# Patient Record
Sex: FEMALE | Race: WHITE
[De-identification: names, ages, dates, MRNs, and addresses within clinical notes are randomized per-mention and may not be internally consistent; named-entity substitution may affect disease eponyms.]

---

## 2017-05-30 ENCOUNTER — HOSPITAL ENCOUNTER (INPATIENT)
Dept: HOSPITAL 17 - PHED | Age: 82
LOS: 3 days | Discharge: HOME | DRG: 378 | End: 2017-06-02
Attending: INTERNAL MEDICINE | Admitting: INTERNAL MEDICINE
Payer: MEDICARE

## 2017-05-30 VITALS
OXYGEN SATURATION: 98 % | TEMPERATURE: 99.1 F | RESPIRATION RATE: 12 BRPM | HEART RATE: 94 BPM | SYSTOLIC BLOOD PRESSURE: 144 MMHG | DIASTOLIC BLOOD PRESSURE: 75 MMHG

## 2017-05-30 VITALS
HEART RATE: 100 BPM | OXYGEN SATURATION: 96 % | RESPIRATION RATE: 26 BRPM | DIASTOLIC BLOOD PRESSURE: 70 MMHG | SYSTOLIC BLOOD PRESSURE: 140 MMHG

## 2017-05-30 VITALS
DIASTOLIC BLOOD PRESSURE: 87 MMHG | HEART RATE: 94 BPM | SYSTOLIC BLOOD PRESSURE: 132 MMHG | RESPIRATION RATE: 26 BRPM | OXYGEN SATURATION: 100 %

## 2017-05-30 VITALS
DIASTOLIC BLOOD PRESSURE: 76 MMHG | RESPIRATION RATE: 39 BRPM | SYSTOLIC BLOOD PRESSURE: 115 MMHG | OXYGEN SATURATION: 100 % | TEMPERATURE: 98.7 F | HEART RATE: 92 BPM

## 2017-05-30 VITALS
OXYGEN SATURATION: 99 % | HEART RATE: 94 BPM | TEMPERATURE: 98 F | SYSTOLIC BLOOD PRESSURE: 83 MMHG | DIASTOLIC BLOOD PRESSURE: 44 MMHG | RESPIRATION RATE: 20 BRPM

## 2017-05-30 VITALS
RESPIRATION RATE: 23 BRPM | TEMPERATURE: 98.7 F | OXYGEN SATURATION: 99 % | HEART RATE: 91 BPM | SYSTOLIC BLOOD PRESSURE: 116 MMHG | DIASTOLIC BLOOD PRESSURE: 53 MMHG

## 2017-05-30 VITALS
HEART RATE: 93 BPM | RESPIRATION RATE: 16 BRPM | OXYGEN SATURATION: 98 % | DIASTOLIC BLOOD PRESSURE: 53 MMHG | SYSTOLIC BLOOD PRESSURE: 94 MMHG

## 2017-05-30 VITALS
SYSTOLIC BLOOD PRESSURE: 109 MMHG | RESPIRATION RATE: 22 BRPM | OXYGEN SATURATION: 100 % | HEART RATE: 92 BPM | DIASTOLIC BLOOD PRESSURE: 56 MMHG

## 2017-05-30 VITALS — DIASTOLIC BLOOD PRESSURE: 61 MMHG | SYSTOLIC BLOOD PRESSURE: 119 MMHG

## 2017-05-30 VITALS
OXYGEN SATURATION: 98 % | SYSTOLIC BLOOD PRESSURE: 114 MMHG | DIASTOLIC BLOOD PRESSURE: 56 MMHG | HEART RATE: 96 BPM | RESPIRATION RATE: 16 BRPM

## 2017-05-30 VITALS
DIASTOLIC BLOOD PRESSURE: 88 MMHG | OXYGEN SATURATION: 100 % | RESPIRATION RATE: 22 BRPM | SYSTOLIC BLOOD PRESSURE: 145 MMHG | HEART RATE: 95 BPM

## 2017-05-30 VITALS — HEIGHT: 65 IN | WEIGHT: 159.39 LBS | BODY MASS INDEX: 26.56 KG/M2

## 2017-05-30 VITALS
DIASTOLIC BLOOD PRESSURE: 59 MMHG | RESPIRATION RATE: 16 BRPM | SYSTOLIC BLOOD PRESSURE: 102 MMHG | TEMPERATURE: 97.7 F | OXYGEN SATURATION: 98 % | HEART RATE: 95 BPM

## 2017-05-30 VITALS
TEMPERATURE: 98 F | SYSTOLIC BLOOD PRESSURE: 141 MMHG | DIASTOLIC BLOOD PRESSURE: 73 MMHG | HEART RATE: 96 BPM | RESPIRATION RATE: 22 BRPM | OXYGEN SATURATION: 100 %

## 2017-05-30 VITALS
SYSTOLIC BLOOD PRESSURE: 96 MMHG | DIASTOLIC BLOOD PRESSURE: 53 MMHG | HEART RATE: 89 BPM | RESPIRATION RATE: 16 BRPM | OXYGEN SATURATION: 100 %

## 2017-05-30 VITALS
RESPIRATION RATE: 16 BRPM | SYSTOLIC BLOOD PRESSURE: 109 MMHG | OXYGEN SATURATION: 98 % | DIASTOLIC BLOOD PRESSURE: 56 MMHG | HEART RATE: 91 BPM

## 2017-05-30 VITALS
DIASTOLIC BLOOD PRESSURE: 85 MMHG | HEART RATE: 96 BPM | OXYGEN SATURATION: 99 % | SYSTOLIC BLOOD PRESSURE: 140 MMHG | RESPIRATION RATE: 27 BRPM

## 2017-05-30 VITALS — HEART RATE: 96 BPM

## 2017-05-30 VITALS
SYSTOLIC BLOOD PRESSURE: 125 MMHG | HEART RATE: 90 BPM | DIASTOLIC BLOOD PRESSURE: 80 MMHG | OXYGEN SATURATION: 100 % | RESPIRATION RATE: 26 BRPM

## 2017-05-30 VITALS — OXYGEN SATURATION: 97 %

## 2017-05-30 VITALS
SYSTOLIC BLOOD PRESSURE: 147 MMHG | HEART RATE: 102 BPM | DIASTOLIC BLOOD PRESSURE: 75 MMHG | OXYGEN SATURATION: 98 % | RESPIRATION RATE: 36 BRPM

## 2017-05-30 VITALS
OXYGEN SATURATION: 95 % | HEART RATE: 96 BPM | SYSTOLIC BLOOD PRESSURE: 140 MMHG | RESPIRATION RATE: 27 BRPM | DIASTOLIC BLOOD PRESSURE: 78 MMHG

## 2017-05-30 VITALS — OXYGEN SATURATION: 100 %

## 2017-05-30 DIAGNOSIS — Z96.653: ICD-10-CM

## 2017-05-30 DIAGNOSIS — I10: ICD-10-CM

## 2017-05-30 DIAGNOSIS — K92.1: Primary | ICD-10-CM

## 2017-05-30 DIAGNOSIS — H35.30: ICD-10-CM

## 2017-05-30 DIAGNOSIS — Z87.891: ICD-10-CM

## 2017-05-30 DIAGNOSIS — D50.0: ICD-10-CM

## 2017-05-30 DIAGNOSIS — K22.70: ICD-10-CM

## 2017-05-30 DIAGNOSIS — E78.5: ICD-10-CM

## 2017-05-30 DIAGNOSIS — M48.06: ICD-10-CM

## 2017-05-30 DIAGNOSIS — R74.8: ICD-10-CM

## 2017-05-30 DIAGNOSIS — Z86.010: ICD-10-CM

## 2017-05-30 DIAGNOSIS — E04.1: ICD-10-CM

## 2017-05-30 DIAGNOSIS — J90: ICD-10-CM

## 2017-05-30 DIAGNOSIS — M19.90: ICD-10-CM

## 2017-05-30 DIAGNOSIS — E78.00: ICD-10-CM

## 2017-05-30 DIAGNOSIS — K21.9: ICD-10-CM

## 2017-05-30 DIAGNOSIS — E87.6: ICD-10-CM

## 2017-05-30 DIAGNOSIS — F32.9: ICD-10-CM

## 2017-05-30 DIAGNOSIS — F41.9: ICD-10-CM

## 2017-05-30 DIAGNOSIS — K80.20: ICD-10-CM

## 2017-05-30 DIAGNOSIS — R01.1: ICD-10-CM

## 2017-05-30 LAB
ALP SERPL-CCNC: 61 U/L (ref 45–117)
ALT SERPL-CCNC: 17 U/L (ref 10–53)
ANION GAP SERPL CALC-SCNC: 9 MEQ/L (ref 5–15)
APTT BLD: 19.8 SEC (ref 24.3–30.1)
AST SERPL-CCNC: 15 U/L (ref 15–37)
BASOPHILS # BLD AUTO: 0.5 TH/MM3 (ref 0–0.2)
BASOPHILS NFR BLD: 4.1 % (ref 0–2)
BILIRUB SERPL-MCNC: 0.4 MG/DL (ref 0.2–1)
BUN SERPL-MCNC: 30 MG/DL (ref 7–18)
CHLORIDE SERPL-SCNC: 107 MEQ/L (ref 98–107)
CK SERPL-CCNC: 42 U/L (ref 26–192)
COLOR UR: YELLOW
COMMENT (UR): (no result)
CULTURE IF INDICATED: (no result)
EOSINOPHIL # BLD: 0 TH/MM3 (ref 0–0.4)
EOSINOPHIL NFR BLD: 0.4 % (ref 0–4)
ERYTHROCYTE [DISTWIDTH] IN BLOOD BY AUTOMATED COUNT: 17.5 % (ref 11.6–17.2)
GFR SERPLBLD BASED ON 1.73 SQ M-ARVRAT: 47 ML/MIN (ref 89–?)
GLUCOSE UR STRIP-MCNC: (no result) MG/DL
HCO3 BLD-SCNC: 23.6 MEQ/L (ref 21–32)
HCT VFR BLD CALC: 14.7 % (ref 35–46)
HCT VFR BLD CALC: 22.5 % (ref 35–46)
HEMO FLAGS: (no result)
HGB UR QL STRIP: (no result)
INR PPP: 0.9 RATIO
KETONES UR STRIP-MCNC: (no result) MG/DL
LACTIC ACID GHOST: (no result)
LEUKOCYTE ESTERASE UR QL STRIP: (no result) /HPF (ref 0–5)
LYMPHOCYTES # BLD AUTO: 1.9 TH/MM3 (ref 1–4.8)
LYMPHOCYTES NFR BLD AUTO: 16.6 % (ref 9–44)
MAGNESIUM SERPL-MCNC: 2.4 MG/DL (ref 1.5–2.5)
MCH RBC QN AUTO: 28.4 PG (ref 27–34)
MCHC RBC AUTO-ENTMCNC: 31.3 % (ref 32–36)
MCV RBC AUTO: 90.7 FL (ref 80–100)
METHOD OF COLLECTION: (no result)
MONOCYTES NFR BLD: 6.8 % (ref 0–8)
NEUTROPHILS # BLD AUTO: 8 TH/MM3 (ref 1.8–7.7)
NEUTROPHILS NFR BLD AUTO: 72.1 % (ref 16–70)
NITRITE UR QL STRIP: (no result)
PLATELET # BLD: 384 TH/MM3 (ref 150–450)
POTASSIUM SERPL-SCNC: 3.6 MEQ/L (ref 3.5–5.1)
PROTHROMBIN TIME: 10.4 SEC (ref 9.8–11.6)
RBC # BLD AUTO: 1.63 MIL/MM3 (ref 4–5.3)
REVIEW FLAG: (no result)
ROULEAUX BLD QL SMEAR: PRESENT
SCAN/DIFF: (no result)
SODIUM SERPL-SCNC: 140 MEQ/L (ref 136–145)
SP GR UR STRIP: 1.03 (ref 1–1.03)
WBC # BLD AUTO: 11.2 TH/MM3 (ref 4–11)

## 2017-05-30 PROCEDURE — 80053 COMPREHEN METABOLIC PANEL: CPT

## 2017-05-30 PROCEDURE — 85014 HEMATOCRIT: CPT

## 2017-05-30 PROCEDURE — C9113 INJ PANTOPRAZOLE SODIUM, VIA: HCPCS

## 2017-05-30 PROCEDURE — 80048 BASIC METABOLIC PNL TOTAL CA: CPT

## 2017-05-30 PROCEDURE — 85018 HEMOGLOBIN: CPT

## 2017-05-30 PROCEDURE — 85007 BL SMEAR W/DIFF WBC COUNT: CPT

## 2017-05-30 PROCEDURE — 71010: CPT

## 2017-05-30 PROCEDURE — 81001 URINALYSIS AUTO W/SCOPE: CPT

## 2017-05-30 PROCEDURE — 84484 ASSAY OF TROPONIN QUANT: CPT

## 2017-05-30 PROCEDURE — 74177 CT ABD & PELVIS W/CONTRAST: CPT

## 2017-05-30 PROCEDURE — 93005 ELECTROCARDIOGRAM TRACING: CPT

## 2017-05-30 PROCEDURE — 87641 MR-STAPH DNA AMP PROBE: CPT

## 2017-05-30 PROCEDURE — 85027 COMPLETE CBC AUTOMATED: CPT

## 2017-05-30 PROCEDURE — 86920 COMPATIBILITY TEST SPIN: CPT

## 2017-05-30 PROCEDURE — 86901 BLOOD TYPING SEROLOGIC RH(D): CPT

## 2017-05-30 PROCEDURE — 82272 OCCULT BLD FECES 1-3 TESTS: CPT

## 2017-05-30 PROCEDURE — 85730 THROMBOPLASTIN TIME PARTIAL: CPT

## 2017-05-30 PROCEDURE — 88305 TISSUE EXAM BY PATHOLOGIST: CPT

## 2017-05-30 PROCEDURE — 83690 ASSAY OF LIPASE: CPT

## 2017-05-30 PROCEDURE — 36430 TRANSFUSION BLD/BLD COMPNT: CPT

## 2017-05-30 PROCEDURE — 96365 THER/PROPH/DIAG IV INF INIT: CPT

## 2017-05-30 PROCEDURE — P9021 RED BLOOD CELLS UNIT: HCPCS

## 2017-05-30 PROCEDURE — 86900 BLOOD TYPING SEROLOGIC ABO: CPT

## 2017-05-30 PROCEDURE — 83605 ASSAY OF LACTIC ACID: CPT

## 2017-05-30 PROCEDURE — 82550 ASSAY OF CK (CPK): CPT

## 2017-05-30 PROCEDURE — 85025 COMPLETE CBC W/AUTO DIFF WBC: CPT

## 2017-05-30 PROCEDURE — 86850 RBC ANTIBODY SCREEN: CPT

## 2017-05-30 PROCEDURE — 87040 BLOOD CULTURE FOR BACTERIA: CPT

## 2017-05-30 PROCEDURE — P9016 RBC LEUKOCYTES REDUCED: HCPCS

## 2017-05-30 PROCEDURE — 93306 TTE W/DOPPLER COMPLETE: CPT

## 2017-05-30 PROCEDURE — 83735 ASSAY OF MAGNESIUM: CPT

## 2017-05-30 PROCEDURE — 87804 INFLUENZA ASSAY W/OPTIC: CPT

## 2017-05-30 PROCEDURE — 30233N1 TRANSFUSION OF NONAUTOLOGOUS RED BLOOD CELLS INTO PERIPHERAL VEIN, PERCUTANEOUS APPROACH: ICD-10-PCS

## 2017-05-30 PROCEDURE — 85610 PROTHROMBIN TIME: CPT

## 2017-05-30 RX ADMIN — Medication SCH ML: at 20:52

## 2017-05-30 RX ADMIN — ACETAMINOPHEN PRN MG: 325 TABLET ORAL at 16:59

## 2017-05-30 RX ADMIN — STANDARDIZED SENNA CONCENTRATE AND DOCUSATE SODIUM SCH TAB: 8.6; 5 TABLET, FILM COATED ORAL at 20:52

## 2017-05-30 RX ADMIN — PANTOPRAZOLE SODIUM SCH MLS/HR: 40 INJECTION, POWDER, FOR SOLUTION INTRAVENOUS at 09:43

## 2017-05-30 RX ADMIN — ATORVASTATIN CALCIUM SCH MG: 10 TABLET, FILM COATED ORAL at 20:51

## 2017-05-30 NOTE — EKG
Date Performed: 05/30/2017       Time Performed: 07:49:34

 

PTAGE:      84 years

 

EKG:      Sinus rhythm 

 

 Extensive ST-T changes are nonspecific Borderline ECG

 

PREVIOUS TRACING       : 09/20/2016 18.57 Compared to previous tracing, nonspecific ST/T changes are 
now present.

 

DOCTOR:   Anthony Lui  Interpretating Date/Time  05/30/2017 10:53:18

## 2017-05-30 NOTE — RADHPO
EXAM DATE/TIME:  05/30/2017 08:36 

 

[facility] COMPARISON:     

CHEST PA & LAT, September 20, 2016, 15:09.

 

                     

INDICATIONS :     

Cough, short of breath.

                     

 

MEDICAL HISTORY :     

None.          

 

SURGICAL HISTORY :     

None.   

 

ENCOUNTER:     

Initial                                        

 

ACUITY:     

1 week      

 

PAIN SCORE:     

0/10

 

LOCATION:     

Bilateral chest 

 

FINDINGS:     

There are some increased interstitial markings bilaterally with some prominence of the pulmonary vasc
ulature. This is new compared to the prior study. This is suggestive of some mild pulmonary edema. Th
e heart size is mildly prominent. There is no pleural effusions. No evidence of pneumothorax. The bon
y structures are stable.

 

CONCLUSION:     Mild pulmonary edema.

 

 

 

 Ronald Valenzuela MD on May 30, 2017 at 9:03           

Board Certified Radiologist.

 This report was verified electronically.

## 2017-05-30 NOTE — PD
HPI


Chief Complaint:  Cold / Flu Symptoms


Time Seen by Provider:  07:56


Travel History


International Travel<30 days:  No


Contact w/Intl Traveler<30days:  No


Traveled to known affect area:  No





History of Present Illness


HPI


Patient is an 84-year-old female with history of iron deficiency anemia 

requiring blood transfusions in the past, hypertension, hyperlipidemia, anxiety

, depression, GERD who presents to emergency room with multiple complaints.  

Patient reports that for the past 3 weeks, she has been feeling short of breath 

on exertion as well as been having generalized weakness.  She reports that she 

has had no energy and for the past few days and has been lying in bed because 

of her weakness.  Reports that she has overall decreased PO intake over the 

past few days.  Patient reports no fevers or chills or any sick contacts.  

Patient denies any chest pain, reports shortness of breath on exertion.  Denies 

chest pain.  Denies abdominal pain.  Denies n/v.  Denies fever/chills.  Reports 

that she does have a history of anemia, reports that her primary care doctor 

recently took her off her iron pills as "I did not need them anymore and my 

labs were fine."  Patient also reports that she had a colonoscopy as well as 

endoscopy last year, reports that everything was negative





PFSH


Past Medical History


Anemia:  Yes


Arthritis:  Yes


Autoimmune Disease:  No


Anxiety:  Yes


Depression:  Yes


Heart Rhythm Problems:  No


Cancer:  No


Cardiovascular Problems:  Yes


High Cholesterol:  Yes


Congestive Heart Failure:  No


Developmental Delay:  Yes


Diabetes:  No


Diminished Hearing:  No


Endocrine:  No


GERD:  Yes


Glaucoma:  No


Genitourinary:  No


Hepatitis:  No


Hiatal Hernia:  No


Hypertension:  Yes


Immune Disorder:  No


Implanted Vascular Access Dvce:  Yes


Medical other:  Yes (arthritis)


Musculoskeletal:  Yes


Neurologic:  No


Psychiatric:  Yes


Reproductive:  No


Respiratory:  No


Immunizations Current:  Yes


Thyroid Disease:  No


Tetanus Vaccination:  Unknown


Influenza Vaccination:  Yes


Pregnant?:  Not Pregnant


Menopausal:  Yes





Past Surgical History


Abdominal Surgery:  Yes (HYSTERECTOMY)


Body Medical Devices:  FRANKIE EYE LENS


Cardiac Surgery:  No


Ear Surgery:  No


Endocrine Surgery:  No


Eye Surgery:  Yes (BILATERAL CATARACT SX; LEFT RETINA SX x2)


Genitourinary Surgery:  No


Gynecologic Surgery:  Yes (HYSTERECTOMY)


Hysterectomy:  Yes


Joint Replacement:  Yes (Bilateral Knee)


Oral Surgery:  No


Thoracic Surgery:  No


Other Surgery:  Yes (LEFT TOTAL KNEE REPLACEMENT)





Social History


Alcohol Use:  Yes (occas)


Tobacco Use:  No (QUIT 30 YRS)


Substance Use:  No





Allergies-Medications


(Allergen,Severity, Reaction):  


Coded Allergies:  


     Morphine (Verified  Allergy, Intermediate, 5/30/17)


Reported Meds & Prescriptions





Reported Meds & Active Scripts


Active


Reported


Cetirizine (Cetirizine HCl) 10 Mg Tab 10 Mg PO DAILY


Xanax (Alprazolam) 0.25 Mg Tab 0.25 Mg PO AS DIRECTED PRN


Lipitor (Atorvastatin Calcium) 10 Mg Tab 10 Mg PO HS


Lisinopril 10 Mg Tab 10 Mg PO DAILY


Sertraline (Sertraline HCl) 100 Mg Tab 100 Mg PO DAILY


Protonix (Pantoprazole Sodium) 40 Mg Tab 40 Mg PO DAILY








Review of Systems


General / Constitutional:  No: Fever, Chills


Eyes:  No: Visual changes


HENT:  No: Headaches, Vertigo, Lightheadedness, Sore Throat


Cardiovascular:  Positive: Dyspnea on exertion,  No: Chest Pain or Discomfort, 

Palpitations, Irregular Rhythm, Tachycardia, Diaphoresis


Respiratory:  Positive: Shortness of Breath,  No: Cough, Wheezing


Gastrointestinal:  No: Nausea, Vomiting, Abdominal Pain


Genitourinary:  No: Dysuria


Musculoskeletal:  No: Pain


Skin:  No Rash


Neurologic:  Positive: Weakness, Dizziness,  No: Headache


Psychiatric:  No: Depression


Endocrine:  No: Polydipsia


Hematologic/Lymphatic:  No: Easy Bruising





Physical Exam


Narrative


GENERAL: Moderate distress


SKIN: Focused skin assessment warm/dry.  Patient pale-appearing


HEAD: Atraumatic. Normocephalic. 


EYES: Pupils equal and round. No scleral icterus. No injection or drainage. 


ENT: No nasal bleeding or discharge.  Mucous membranes pink and moist.


NECK: Trachea midline. No JVD. 


CARDIOVASCULAR: Regular rate and rhythm.  No murmur appreciated.


RESPIRATORY: No accessory muscle use. Clear to auscultation. Breath sounds 

equal bilaterally. 


GASTROINTESTINAL: Abdomen soft, non-tender, nondistended. Hepatic and splenic 

margins not palpable.  Patient with heme positive, black tarry stool


MUSCULOSKELETAL: No obvious deformities. No clubbing.  No cyanosis.  No edema. 


NEUROLOGICAL: Awake and alert. No obvious cranial nerve deficits.  Motor 

grossly within normal limits. Normal speech.


PSYCHIATRIC: Appropriate mood and affect; insight and judgment normal.





Data


Data


Last Documented VS





Vital Signs








  Date Time  Temp Pulse Resp B/P Pulse Ox O2 Delivery O2 Flow Rate FiO2


 


5/30/17 10:26  89 16 96/53 100 Nasal Cannula 2 


 


5/30/17 07:50 97.7       








Orders





 Complete Blood Count With Diff (5/30/17 08:04)


Comprehensive Metabolic Panel (5/30/17 08:04)


Prothrombin Time / Inr (Pt) (5/30/17 08:04)


Act Partial Throm Time (Ptt) (5/30/17 08:04)


Lactic Acid Sepsis Protocol (5/30/17 08:04)


Magnesium (Mg) (5/30/17 08:04)


Lipase (5/30/17 08:04)


Ckmb (Isoenzyme) Profile (5/30/17 08:04)


Troponin I (5/30/17 08:04)


Urinalysis - C+S If Indicated (5/30/17 08:04)


Influenzae A/B Antigen (5/30/17 08:04)


Blood Culture (5/30/17 08:04)


Chest, Single Ap (5/30/17 08:04)


Type And Screen (5/30/17 08:04)


Red Blood Cells (Rbc) (5/30/17 08:45)


Blood Product Administration .UPON TRANSFUSION (5/30/17 08:45)


Sodium Chlor 0.9% 250 Ml Inj (Ns 250 Ml (5/30/17 08:45)


Sodium Chlorid 0.9% 500 Ml Inj (Ns 500 M (5/30/17 09:00)


Pantoprazole Inj (Protonix Inj) (5/30/17 09:00)


Pantoprazole Inj (Protonix Inj) (5/30/17 09:00)


Electrocardiogram (5/30/17 )





Labs





 Laboratory Tests








Test 5/30/17 5/30/17 5/30/17 5/30/17





 07:50 08:25 08:45 09:05


 


White Blood Count 11.2 TH/MM3   


 


Red Blood Count 1.63 MIL/MM3   


 


Hemoglobin 4.6 GM/DL   


 


Hematocrit 14.7 %   


 


Mean Corpuscular Volume 90.7 FL   


 


Mean Corpuscular Hemoglobin 28.4 PG   


 


Mean Corpuscular Hemoglobin 31.3 %   





Concent    


 


Red Cell Distribution Width 17.5 %   


 


Platelet Count 384 TH/MM3   


 


Mean Platelet Volume 8.6 FL   


 


Neutrophils (%) (Auto) 72.1 %   


 


Lymphocytes (%) (Auto) 16.6 %   


 


Monocytes (%) (Auto) 6.8 %   


 


Eosinophils (%) (Auto) 0.4 %   


 


Basophils (%) (Auto) 4.1 %   


 


Neutrophils # (Auto) 8.0 TH/MM3   


 


Lymphocytes # (Auto) 1.9 TH/MM3   


 


Monocytes # (Auto) 0.8 TH/MM3   


 


Eosinophils # (Auto) 0.0 TH/MM3   


 


Basophils # (Auto) 0.5 TH/MM3   


 


CBC Comment AUTO DIFF    


 


Differential Comment AUTO DIFF   





 CONFIRMED   


 


[place] PRESENT    


 


Prothrombin Time 10.4 SEC   


 


Prothromb Time International 0.9 RATIO   





Ratio    


 


Activated Partial 19.8 SEC   





Thromboplast Time    


 


Sodium Level 140 MEQ/L   


 


Potassium Level 3.6 MEQ/L   


 


Chloride Level 107 MEQ/L   


 


Carbon Dioxide Level 23.6 MEQ/L   


 


Anion Gap 9 MEQ/L   


 


Blood Urea Nitrogen 30 MG/DL   


 


Creatinine 1.10 MG/DL   


 


Estimat Glomerular Filtration 47 ML/MIN   





Rate    


 


Random Glucose 147 MG/DL   


 


Calcium Level 8.2 MG/DL   


 


Magnesium Level 2.4 MG/DL   


 


Total Bilirubin 0.4 MG/DL   


 


Aspartate Amino Transf 15 U/L   





(AST/SGOT)    


 


Alanine Aminotransferase 17 U/L   





(ALT/SGPT)    


 


Alkaline Phosphatase 61 U/L   


 


Total Creatine Kinase 42 U/L   


 


Troponin I 0.21 NG/ML   


 


Total Protein 6.4 GM/DL   


 


Albumin 3.1 GM/DL   


 


Lipase 84 U/L   


 


Blood Type O NEGATIVE    


 


Antibody Screen NEGATIVE    


 


Lactic Acid Level  2.3 mmol/L  


 


Crossmatch   Leukocyte-Reduced 





   Red Blood 





   Cells 


 


Blood Bank Comment     


 


Urine Collection Type    CLEAN CATCH 


 


Urine Color    YELLOW 


 


Urine Turbidity    CLEAR 


 


Urine pH    5.0 


 


Urine Specific Gravity    1.027 


 


Urine Protein    NEG mg/dL


 


Urine Glucose (UA)    NEG mg/dL


 


Urine Ketones    NEG mg/dL


 


Urine Occult Blood    NEG 


 


Urine Nitrite    NEG 


 


Urine Bilirubin    NEG 


 


Urine Leukocyte Esterase    NEG 


 


Urine WBC    0-2 /hpf


 


Microscopic Urinalysis Comment    CULT NOT





    INDICATED











MDM


Medical Decision Making


Medical Screen Exam Complete:  Yes


Emergency Medical Condition:  Yes


Interpretation(s)


EKG at 0749: NSR at 93bpm, qt/qtc: 398/455, nonspecific ST and T-wave changes








Vital Signs








  Date Time  Temp Pulse Resp B/P Pulse Ox O2 Delivery O2 Flow Rate FiO2


 


5/30/17 07:55  90 16  98 Room Air  


 


5/30/17 07:50 97.7 95 16 102/59 98 Room Air  


 


5/30/17 07:36 98.0 94 20 83/44 99   








Differential Diagnosis


GI bleed, colitis, anemia, esophagitis, pneumonia, ACS, arrhythmia


Narrative Course


Patient is an 84-year-old female who presents to emergency room with complaints 

of generalized weakness for the past 3 weeks.  Patient reports that she has 

been having increasingly short of breath on exertion, reports nonproductive 

cough and malaise weakness.  On exam, patient is pale-appearing, she is 

hypotensive with a blood pressure 83/44 with a pulse ox of 94%.  





Labs including blood cultures and lactic acid ordered.





Patient does have extensive testing T-wave changes on EKG which are nonspecific

, cardiac enzymes ordered as well.





Patient's hemoglobin is 4.6, hematocrit 14.7, patient does have dark tarry 

stools which are heme positive.  Patient with most likely GI bleed.  Patient 

was started protonix as well as a protonix gtt.  2 units of blood ordered.  

Patient did give verbal as well as written consent for blood transfusion.  

Patient's troponin is 0.21, patient with nonspecific ST and T-wave changes on 

EKG, it is most likely from symptomatic anemia from GI bleed.  Lactic acid is 

2.3 - patient has been pancultured, elevated lactate most likely from 

hypoperfusion. Case reviewed with Dr. Gomez who accepts pt to service


Critical Care Narrative


Aggregate critical care time was 30 minutes. Time to perform other separately 

billable procedures was not  


included in the critical care time. My time did not include minutes spent 

treating any other patients simultaneously or on  


activities that did not directly contribute to the patient's treatment.  





The services I provided to this patient were to treat and/or prevent clinically 

significant deterioration that could result  


in:  death, decompensation, deterioration





I provided critical care services requiring my management, as noted below:


Chart data review, documentation time, medication orders and management, vital 

sign assessments/reviewing monitor data,  


ordering and reviewing lab tests, ordering and interpreting/reviewing x-rays 

and diagnostic studies, care of the patient  


and discussion of the patient with the admitting physicians.





Diagnosis





 Primary Impression:  


 GI bleed


 Qualified Code:  K92.1 - Gastrointestinal hemorrhage with melena


 Additional Impressions:  


 Anemia


 Qualified Code:  D64.9 - Anemia, unspecified type


 NSTEMI (non-ST elevation myocardial infarction)





Admitting Information


Admitting Physician Requests:  Kathryn Rosales DO May 30, 2017 09:14

## 2017-05-30 NOTE — MH
cc:

REI JIANG

****

 

DATE OF ADMISSION:  5/30/2017

 

ADMITTING DIAGNOSIS:

GI bleed.

 

HISTORY OF PRESENT ILLNESS

The patient is a very pleasant 84-year-old female who states that she just has

not been feeling well for the last month.  She had been having some work done

in her house that she attributed it for mold and she attributed her feeling of

not feeling quite well secondary to that.  She also states that she tripped and

fell approximately two weeks, was a little bit sore but really had no obvious

bruising at the time.  She also at that time had developed a cough that was not

improving despite over-the-counter medications.  She did have two nights of

sweats that she attributed to her soaked pajamas.

 

She presented to the emergency room secondary to the generalized weakness and

the cough.  The history is being obtained while her nephew is in the room.  He

volunteers that she also had been complaining of some shortness of breath while

she had the cough and some lightheadedness.  She does have a history of iron

deficiency anemia in the past requiring transfusions.  When I asked her about

any abdominal pain or nausea, she denied.  She has been eating less but

according to her it is because she has been feeling fairly depressed and has

just not wanted to cook.  She does state that she has an occasional abdominal

pain and cramping but according to her she understands that she has irritable

bowel and she attributes it to that.  She will occasionally sporadically have

some diarrhea.  She will take some Immodium.  She denies noticing any black or

tarry stools or blood in her stools.  She had been on iron pills which we had

stopped earlier in the year as she apparently had been doing better with her

anemia.

 

PAST MEDICAL HISTORY:

Significant for:

1. Anemia.

2. Diverticulosis.

3. Valdez's esophagus

4. Colon polyps.

5. Hypertension.

6. Hyperlipidemia.

7. She does have lumbar stenosis and macular degeneration.

8. She does have major depressive disorder and is under the care of a

psychiatrist.

9. She does have a thyroid nodule that is being investigated.

 

PAST SURGICAL HISTORY:

1. Total abdominal hysterectomy with BSO.

2. Total knee arthroplasty.

3. Cataract surgery.

 

ALLERGIES:

MORPHINE.

 

MEDICATIONS:

1. Atorvastatin 20 milligrams daily.

2. Lisinopril 20 milligrams daily.

3. Pantoprazole  40 milligrams daily.

4. Sertraline 150 milligrams daily.

5. Alprazolam 0.25 as needed twice a day.

 

HABITS:

She does not smoke.  She will have an occasional beer or vodka once or twice a

day.

 

SOCIAL HISTORY:

She is  and retired.  She was a .  She lives by herself

in [facility].

 

REVIEW OF SYSTEMS:

See HPI.  She denies any chest pain or palpitations, or any lower extremity

edema or swelling, just this cough that was nonproductive that would not

improve.  She does state that she has been urinating well.  Aside from some

weakness, has essentially also just being feeling depressed with the loss of

her partner and the relocation of her friends in the community that she lives

in.

 

LABORATORY DATA:

On admission, included a white count of 11.2, hemoglobin of 4.6, hematocrit

14.7, platelet count of 384.

 

Sodium 140, potassium 3.6, BUN was 30, creatinine 1.1, lactic acid was 2.3,

troponin was 0.21.  Albumin was 3.1.  INR was 0.9, urine was clear.

 

EKG showed sinus rhythm with extensive nonspecific ST changes.

 

Chest x-ray, some increased interstitial markings with some prominence of the

pulmonary vasculature, that was new.

 

 

 

PHYSICAL EXAMINATION:

VITAL SIGNS: Temperature 98.7, pulse 91, respiratory rate 23, blood pressure

was 116/53, pulse ox 99%.  She is lying in the ICU.  She looks pale.  She is

alert, and does not appear to be in any acute distress.  She is wearing oxygen.

She has a clear moist oral mucosa.

NECK: Supple.

LUNGS: Clear to auscultation.  She has no rales, rhonchi or wheezing.

HEART: She does have a 2/6 systolic ejection murmur over her precordium.

ABDOMEN: Good bowel sounds in all four quadrants.  No rebound or guarding.

EXTREMITIES: No clubbing, cyanosis or edema.

 

ASSESSMENT AND PLAN:

The patient is an 84 year-old female with prior history of iron deficiency

anemia with GI bleed.  On prior admissions we were not able to identify the

source of her bleed.  She did have extensive diverticulosis.  I am not sure if

we may be dealing with repeated diverticular bleed.  At this point I have asked

GI to see her.  She is not too thrilled about undergoing another endoscopy or

colonoscopy.

 

For the elevated troponin, I will go ahead and repeat that.  That may be

secondary to the strain of the anemia on her heart.  Will have to see how she

does.  Again monitor her volume status closely.  She may need some diuretics

after her transfusion.

 

For her depression, continue her sertraline or alprazolam.  We were taking

about perhaps relocating once she is done with this hospital admission.

 

She is to continue on a PPI particularly with her history of Valdez's

esophagus.  She does have hypertension.  Will obviously halt her

antihypertensives.

 

Further recommendations as the case develops.

 

 

                               __________________________________

                           MD CHRIS Ortega/LENI

D:  5/30/2017/2:15 PM

T:  5/30/2017/3:14 PM

Visit #:  [id number]

Job #:  [id number]

## 2017-05-31 VITALS
HEART RATE: 92 BPM | SYSTOLIC BLOOD PRESSURE: 114 MMHG | DIASTOLIC BLOOD PRESSURE: 55 MMHG | RESPIRATION RATE: 38 BRPM | OXYGEN SATURATION: 94 %

## 2017-05-31 VITALS
SYSTOLIC BLOOD PRESSURE: 123 MMHG | HEART RATE: 88 BPM | RESPIRATION RATE: 29 BRPM | DIASTOLIC BLOOD PRESSURE: 60 MMHG | OXYGEN SATURATION: 95 %

## 2017-05-31 VITALS
SYSTOLIC BLOOD PRESSURE: 118 MMHG | OXYGEN SATURATION: 100 % | DIASTOLIC BLOOD PRESSURE: 54 MMHG | RESPIRATION RATE: 25 BRPM | HEART RATE: 88 BPM

## 2017-05-31 VITALS
OXYGEN SATURATION: 95 % | TEMPERATURE: 97.9 F | HEART RATE: 82 BPM | RESPIRATION RATE: 22 BRPM | DIASTOLIC BLOOD PRESSURE: 53 MMHG | SYSTOLIC BLOOD PRESSURE: 107 MMHG

## 2017-05-31 VITALS
RESPIRATION RATE: 28 BRPM | SYSTOLIC BLOOD PRESSURE: 138 MMHG | OXYGEN SATURATION: 99 % | DIASTOLIC BLOOD PRESSURE: 71 MMHG | HEART RATE: 98 BPM

## 2017-05-31 VITALS
OXYGEN SATURATION: 95 % | RESPIRATION RATE: 16 BRPM | DIASTOLIC BLOOD PRESSURE: 60 MMHG | TEMPERATURE: 98.1 F | SYSTOLIC BLOOD PRESSURE: 123 MMHG | HEART RATE: 90 BPM

## 2017-05-31 VITALS
OXYGEN SATURATION: 98 % | SYSTOLIC BLOOD PRESSURE: 133 MMHG | RESPIRATION RATE: 28 BRPM | HEART RATE: 88 BPM | TEMPERATURE: 97.3 F | DIASTOLIC BLOOD PRESSURE: 67 MMHG

## 2017-05-31 VITALS
OXYGEN SATURATION: 98 % | SYSTOLIC BLOOD PRESSURE: 138 MMHG | HEART RATE: 88 BPM | RESPIRATION RATE: 24 BRPM | DIASTOLIC BLOOD PRESSURE: 77 MMHG

## 2017-05-31 VITALS
RESPIRATION RATE: 29 BRPM | SYSTOLIC BLOOD PRESSURE: 126 MMHG | HEART RATE: 84 BPM | DIASTOLIC BLOOD PRESSURE: 56 MMHG | OXYGEN SATURATION: 95 %

## 2017-05-31 VITALS
TEMPERATURE: 98.3 F | OXYGEN SATURATION: 95 % | SYSTOLIC BLOOD PRESSURE: 135 MMHG | RESPIRATION RATE: 24 BRPM | HEART RATE: 90 BPM | DIASTOLIC BLOOD PRESSURE: 67 MMHG

## 2017-05-31 VITALS
SYSTOLIC BLOOD PRESSURE: 147 MMHG | OXYGEN SATURATION: 96 % | DIASTOLIC BLOOD PRESSURE: 63 MMHG | RESPIRATION RATE: 21 BRPM | HEART RATE: 84 BPM

## 2017-05-31 VITALS
DIASTOLIC BLOOD PRESSURE: 68 MMHG | SYSTOLIC BLOOD PRESSURE: 137 MMHG | HEART RATE: 92 BPM | RESPIRATION RATE: 26 BRPM | OXYGEN SATURATION: 99 %

## 2017-05-31 VITALS
SYSTOLIC BLOOD PRESSURE: 107 MMHG | DIASTOLIC BLOOD PRESSURE: 52 MMHG | HEART RATE: 84 BPM | RESPIRATION RATE: 22 BRPM | OXYGEN SATURATION: 98 %

## 2017-05-31 VITALS
SYSTOLIC BLOOD PRESSURE: 120 MMHG | RESPIRATION RATE: 23 BRPM | DIASTOLIC BLOOD PRESSURE: 61 MMHG | HEART RATE: 82 BPM | OXYGEN SATURATION: 96 %

## 2017-05-31 VITALS
OXYGEN SATURATION: 95 % | DIASTOLIC BLOOD PRESSURE: 74 MMHG | RESPIRATION RATE: 34 BRPM | HEART RATE: 100 BPM | SYSTOLIC BLOOD PRESSURE: 148 MMHG

## 2017-05-31 VITALS
RESPIRATION RATE: 24 BRPM | SYSTOLIC BLOOD PRESSURE: 200 MMHG | DIASTOLIC BLOOD PRESSURE: 181 MMHG | HEART RATE: 90 BPM | OXYGEN SATURATION: 97 %

## 2017-05-31 VITALS
DIASTOLIC BLOOD PRESSURE: 63 MMHG | RESPIRATION RATE: 26 BRPM | HEART RATE: 86 BPM | SYSTOLIC BLOOD PRESSURE: 117 MMHG | OXYGEN SATURATION: 96 %

## 2017-05-31 VITALS
HEART RATE: 86 BPM | DIASTOLIC BLOOD PRESSURE: 58 MMHG | SYSTOLIC BLOOD PRESSURE: 112 MMHG | RESPIRATION RATE: 29 BRPM | OXYGEN SATURATION: 96 %

## 2017-05-31 VITALS
DIASTOLIC BLOOD PRESSURE: 66 MMHG | SYSTOLIC BLOOD PRESSURE: 123 MMHG | HEART RATE: 98 BPM | OXYGEN SATURATION: 98 % | RESPIRATION RATE: 43 BRPM | TEMPERATURE: 98.6 F

## 2017-05-31 VITALS
SYSTOLIC BLOOD PRESSURE: 117 MMHG | OXYGEN SATURATION: 95 % | HEART RATE: 88 BPM | DIASTOLIC BLOOD PRESSURE: 54 MMHG | RESPIRATION RATE: 24 BRPM

## 2017-05-31 VITALS
DIASTOLIC BLOOD PRESSURE: 59 MMHG | HEART RATE: 82 BPM | OXYGEN SATURATION: 99 % | TEMPERATURE: 97.7 F | RESPIRATION RATE: 22 BRPM | SYSTOLIC BLOOD PRESSURE: 114 MMHG

## 2017-05-31 VITALS
TEMPERATURE: 98.1 F | OXYGEN SATURATION: 95 % | SYSTOLIC BLOOD PRESSURE: 110 MMHG | DIASTOLIC BLOOD PRESSURE: 52 MMHG | HEART RATE: 88 BPM | RESPIRATION RATE: 23 BRPM

## 2017-05-31 VITALS
TEMPERATURE: 97.7 F | HEART RATE: 92 BPM | OXYGEN SATURATION: 98 % | SYSTOLIC BLOOD PRESSURE: 135 MMHG | RESPIRATION RATE: 27 BRPM | DIASTOLIC BLOOD PRESSURE: 63 MMHG

## 2017-05-31 VITALS
DIASTOLIC BLOOD PRESSURE: 54 MMHG | HEART RATE: 90 BPM | OXYGEN SATURATION: 99 % | SYSTOLIC BLOOD PRESSURE: 121 MMHG | RESPIRATION RATE: 24 BRPM

## 2017-05-31 VITALS
RESPIRATION RATE: 28 BRPM | HEART RATE: 94 BPM | SYSTOLIC BLOOD PRESSURE: 118 MMHG | OXYGEN SATURATION: 99 % | DIASTOLIC BLOOD PRESSURE: 60 MMHG

## 2017-05-31 VITALS — OXYGEN SATURATION: 97 %

## 2017-05-31 VITALS — HEART RATE: 88 BPM

## 2017-05-31 VITALS — OXYGEN SATURATION: 93 %

## 2017-05-31 LAB
ALP SERPL-CCNC: 63 U/L (ref 45–117)
ALP SERPL-CCNC: 66 U/L (ref 45–117)
ALT SERPL-CCNC: 19 U/L (ref 10–53)
ALT SERPL-CCNC: 21 U/L (ref 10–53)
ANION GAP SERPL CALC-SCNC: 11 MEQ/L (ref 5–15)
ANION GAP SERPL CALC-SCNC: 13 MEQ/L (ref 5–15)
ANION GAP SERPL CALC-SCNC: 7 MEQ/L (ref 5–15)
AST SERPL-CCNC: 22 U/L (ref 15–37)
AST SERPL-CCNC: 31 U/L (ref 15–37)
BASOPHILS # BLD AUTO: 0.6 TH/MM3 (ref 0–0.2)
BASOPHILS NFR BLD: 4 % (ref 0–2)
BILIRUB SERPL-MCNC: 0.8 MG/DL (ref 0.2–1)
BILIRUB SERPL-MCNC: 1.4 MG/DL (ref 0.2–1)
BUN SERPL-MCNC: 21 MG/DL (ref 7–18)
BUN SERPL-MCNC: 22 MG/DL (ref 7–18)
BUN SERPL-MCNC: 25 MG/DL (ref 7–18)
CHLORIDE SERPL-SCNC: 101 MEQ/L (ref 98–107)
CHLORIDE SERPL-SCNC: 104 MEQ/L (ref 98–107)
CHLORIDE SERPL-SCNC: 104 MEQ/L (ref 98–107)
EOSINOPHIL # BLD: 0.1 TH/MM3 (ref 0–0.4)
EOSINOPHIL NFR BLD: 0.5 % (ref 0–4)
ERYTHROCYTE [DISTWIDTH] IN BLOOD BY AUTOMATED COUNT: 16.4 % (ref 11.6–17.2)
GFR SERPLBLD BASED ON 1.73 SQ M-ARVRAT: 47 ML/MIN (ref 89–?)
GFR SERPLBLD BASED ON 1.73 SQ M-ARVRAT: 55 ML/MIN (ref 89–?)
GFR SERPLBLD BASED ON 1.73 SQ M-ARVRAT: 60 ML/MIN (ref 89–?)
HCO3 BLD-SCNC: 22 MEQ/L (ref 21–32)
HCO3 BLD-SCNC: 25.4 MEQ/L (ref 21–32)
HCO3 BLD-SCNC: 27.2 MEQ/L (ref 21–32)
HCT VFR BLD CALC: 22 % (ref 35–46)
HCT VFR BLD CALC: 22.8 % (ref 35–46)
HCT VFR BLD CALC: 23.7 % (ref 35–46)
HCT VFR BLD CALC: 32.5 % (ref 35–46)
HEMO FLAGS: (no result)
LYMPHOCYTES # BLD AUTO: 1.4 TH/MM3 (ref 1–4.8)
LYMPHOCYTES NFR BLD AUTO: 10.1 % (ref 9–44)
MCH RBC QN AUTO: 27.9 PG (ref 27–34)
MCHC RBC AUTO-ENTMCNC: 32.6 % (ref 32–36)
MCV RBC AUTO: 85.5 FL (ref 80–100)
MONOCYTES NFR BLD: 7.9 % (ref 0–8)
NEUTROPHILS # BLD AUTO: 10.7 TH/MM3 (ref 1.8–7.7)
NEUTROPHILS NFR BLD AUTO: 77.5 % (ref 16–70)
PLATELET # BLD: 307 TH/MM3 (ref 150–450)
POTASSIUM SERPL-SCNC: 3.3 MEQ/L (ref 3.5–5.1)
POTASSIUM SERPL-SCNC: 3.4 MEQ/L (ref 3.5–5.1)
POTASSIUM SERPL-SCNC: 3.5 MEQ/L (ref 3.5–5.1)
RBC # BLD AUTO: 2.66 MIL/MM3 (ref 4–5.3)
REVIEW FLAG: (no result)
SODIUM SERPL-SCNC: 136 MEQ/L (ref 136–145)
SODIUM SERPL-SCNC: 138 MEQ/L (ref 136–145)
SODIUM SERPL-SCNC: 140 MEQ/L (ref 136–145)
WBC # BLD AUTO: 13.9 TH/MM3 (ref 4–11)

## 2017-05-31 RX ADMIN — PANTOPRAZOLE SODIUM SCH MLS/HR: 40 INJECTION, POWDER, FOR SOLUTION INTRAVENOUS at 18:34

## 2017-05-31 RX ADMIN — ATORVASTATIN CALCIUM SCH MG: 10 TABLET, FILM COATED ORAL at 21:01

## 2017-05-31 RX ADMIN — SERTRALINE HYDROCHLORIDE SCH MG: 100 TABLET, FILM COATED ORAL at 10:06

## 2017-05-31 RX ADMIN — Medication SCH TAB: at 10:04

## 2017-05-31 RX ADMIN — CHLORHEXIDINE GLUCONATE SCH PACK: 500 CLOTH TOPICAL at 03:52

## 2017-05-31 RX ADMIN — STANDARDIZED SENNA CONCENTRATE AND DOCUSATE SODIUM SCH TAB: 8.6; 5 TABLET, FILM COATED ORAL at 14:49

## 2017-05-31 RX ADMIN — Medication SCH ML: at 21:00

## 2017-05-31 RX ADMIN — Medication SCH ML: at 10:10

## 2017-05-31 RX ADMIN — ACETAMINOPHEN PRN MG: 325 TABLET ORAL at 03:51

## 2017-05-31 RX ADMIN — PANTOPRAZOLE SODIUM SCH MLS/HR: 40 INJECTION, POWDER, FOR SOLUTION INTRAVENOUS at 05:16

## 2017-05-31 RX ADMIN — STANDARDIZED SENNA CONCENTRATE AND DOCUSATE SODIUM SCH TAB: 8.6; 5 TABLET, FILM COATED ORAL at 21:01

## 2017-05-31 NOTE — HHI.PR
Subjective


Remarks


No new complaints, feels good this am





Objective


Vitals





 Vital Signs








  Date Time  Temp Pulse Resp B/P Pulse Ox O2 Delivery O2 Flow Rate FiO2


 


5/31/17 10:39     93   21


 


5/31/17 06:00  88      


 


5/31/17 06:00  88 25 118/54 100   


 


5/31/17 05:00  90 24 121/54 99   


 


5/31/17 04:00 97.7 92 27 135/63 98   


 


5/31/17 04:00  92      


 


5/31/17 03:00  92 26 137/68 99   


 


5/31/17 02:00  98 28 138/71 99   


 


5/31/17 02:00  98      


 


5/31/17 01:00  100 34 148/74 95   


 


5/31/17 00:00 98.6 98 43 123/66 98   


 


5/31/17 00:00  98      


 


5/30/17 23:25     97 Nasal Cannula 2.00 


 


5/30/17 23:00  102 36 147/75 98   


 


5/30/17 22:00  100 26 140/70 96   


 


5/30/17 22:00  100      


 


5/30/17 21:15  96 27 140/78 95   


 


5/30/17 20:13 99.1 94 12 144/75 98   


 


5/30/17 20:00  96      


 


5/30/17 18:52  96 27 140/85 99   


 


5/30/17 18:00 98.0 96 22 141/73 100   


 


5/30/17 17:59   18     


 


5/30/17 17:00  95 22 145/88 100   


 


5/30/17 16:00  94 26 132/87 100   


 


5/30/17 15:05     100 Nasal Cannula 3.00 


 


5/30/17 15:00  90 26 125/80 100   


 


5/30/17 14:00 98.7 92 39 115/76 100   


 


5/30/17 13:00  92 22 109/56 100   














 5/30/17 5/30/17 5/31/17





 14:59 22:59 06:59


 


Intake Total 600 ml  390 ml


 


Output Total  450 ml 1850 ml


 


Balance 600 ml -450 ml -1460 ml


 


   


 


Intake Oral   240 ml


 


IV Total 600 ml  150 ml


 


Output Urine Total  450 ml 1850 ml


 


# Bowel Movements  1 








Result Diagram:  


5/31/17 1220                                                                   

             5/31/17 1220





Other Results





Last Impressions








Chest X-Ray 5/31/17 0000 Signed





Impressions: 





 Service Date/Time:  Wednesday, May 31, 2017 09:26 - CONCLUSION:  1. Mild 





 cardiomegaly with trace positive fluid balance. 2. Minimal left lower lobe 





 airspace disease, likely atelectasis. However, developing aspiration/pneumonia 





 may have a similar appearance.     Anupam Sierra MD 








Objective Remarks


Lying in bed looks comfortable , off oxygen


cta 


jenni 3/6


+bs nontender


no edema





A/P


Problem List:  


(1) Symptomatic anemia


Status:  Acute


Plan:  recieved 2 units of blood last nite, became sob and required diuresis, 

in view of elevated troponin and murmur, 3rd units was held, her hgb has 

stablized though it is still low, she is off oxygen


will transfurs one more unit followed by furosemide 





(2) GI bleed


Status:  Acute


Plan:  she has had recurrent gi bleeds , she has severe diverticulosis, she 

denies abdominal pain except for occasional cramping follwed by diarrhea, this 

is chronic


 consult pending, cont PP





(3) Anxiety


Status:  Chronic


Plan:  mouring loss of her  and friends, neighbores relocating


case management assisting with obtaining help at home, discussed RUBENS





(4) Troponin level elevated


Status:  Acute


Plan:  Asked cardiology to see her  though suspect related to stress from anemia

, they have ordered echo and outpatient ischemic w/u. For now work on 

correcting anemia








Problem Qualifiers





(1) GI bleed:  


Qualified Code:  K92.1 - Gastrointestinal hemorrhage with melena





Flower Gamez MD May 31, 2017 12:57

## 2017-05-31 NOTE — EC
Study

 

Study Date:05/31/2017

 

 

 

STUDY CONCLUSIONS

 

SUMMARY

 

- Left ventricle: The cavity size was normal. Wall thickness was

increased in a pattern of moderate LVH. Systolic function was

normal. The estimated ejection fraction was in the range of 55%

to 60%. Wall motion was normal; there were no regional wall

motion abnormalities.

- Aortic valve: Valve area: 0.61cm^2(VTI). Valve area: 0.53cm^2

(Vmax).

- Mitral valve: Mildly calcified annulus. Mild regurgitation.

- Tricuspid valve: Mild regurgitation.

- Pulmonary arteries: PA peak pressure: 41mm Hg (S).

 

-------------------------------------------------------------------

If LV function is below 40, please consider prescribing an ACEI or

ARB or document rationale for non-use.

 

-------------------------------------------------------------------

PROCEDURE DATA

 

STUDY STATUS:

Elective. Procedure: Transthoracic echocardiography.

Image quality was good. Scanning was performed from the

parasternal, apical, and subcostal acoustic windows. Study

completion: The patient tolerated the procedure well.

Transthoracic echocardiography. M-mode, complete 2D, complete

spectral Doppler, and color Doppler. Height: Height: 65in. Weight:

Weight: 161.7lb. Body mass index: BMI: 27kg/m^2. Body surface area:

BSA: 1.81m^2. Patient status: Inpatient.

 

-------------------------------------------------------------------

CARDIAC ANATOMY

 

LEFT VENTRICLE:

The cavity size was normal. Wall thickness was

increased in a pattern of moderate LVH. Systolic function was

normal. The estimated ejection fraction was in the range of 55% to

60%. Wall motion was normal; there were no regional wall motion

abnormalities.

 

AORTIC VALVE:

thickened, calcified leaflest with restricted motion

and mean gradient = 64 mm hg c/w severe aortic valve stenosis

Trileaflet; normal thickness leaflets. Doppler: Transvalvular

velocity was within the normal range. There was no stenosis. Trace

to mild regurgitation.  Valve area: 0.61cm^2(VTI). Indexed valve

area: 0.34cm^2/m^2 (VTI). Valve area: 0.53cm^2 (Vmax). Indexed

valve area: 0.29cm^2/m^2 (Vmax).  Mean gradient: 63mm Hg (S). Peak

gradient: 113mm Hg (S).

 

AORTA:

Aortic root: The aortic root was normal in size.

 

MITRAL VALVE:

peak gradient = 6 mm hg suggestive of mild mitral

valve stenosis Mildly calcified annulus. Doppler: Transvalvular

velocity was within the normal range. There was no evidence for

stenosis. Mild regurgitation.  Peak gradient: 6mm Hg (D).

 

LEFT ATRIUM:

The atrium was normal in size.

 

RIGHT VENTRICLE:

The cavity size was normal. Wall thickness was

normal.

 

PULMONIC VALVE:

Doppler: Transvalvular velocity was within the

normal range. There was no evidence for stenosis. No regurgitation.

 

TRICUSPID VALVE:

Structurally normal valve. Doppler: Transvalvular

velocity was within the normal range. Mild regurgitation.

 

PULMONARY ARTERY:

The main pulmonary artery was normal-sized.

Systolic pressure was within the normal range.

 

RIGHT ATRIUM:

The atrium was normal in size.

 

PERICARDIUM:

There was no pericardial effusion.

 

SYSTEMIC VEINS:

Inferior vena cava: The vessel was normal in size.

 

-------------------------------------------------------------------

 

Patient weight: 161.7lb

_Ejection fraction:_ 65-75%

_Fractional shortening:_ 32%

up to 5Kg 5-11.5Kg 11.6-22.9Kg 23-45Kg 45-57Kg

Aortic Root 7-13      <17      13-22       17-27   17-27

LA diam     6-13      <23      24-38       33-47   37-40

RVID        10-17     7-15     7-15        7-18    8-17

LVIDd       12-22     <32      24-38       33-47   37-40

LVPW        2-4       3-6      5-7         6-8     7-8

IVS         2-4       3-6      5-7         6-8     7-8

 

-------------------------------------------------------------------

 

BASIC MEASUREMENTS                                     ADULT NORMAL

Left ventricle

LV internal dimension, ED, chordal      *42.6 mm       43-52

level, PLAX

LV internal dimension, ES, chordal       31.1 mm       23-38

level, PLAX

Fractional shortening, chordal level,     *27 %        >29

PLAX

LV posterior wall thickness, ED          17.2 mm       ------------

IVS/LVPW ratio, ED                          1          <1.3

Ventricular septum

Septal thickness, ED                     17.2 mm       ------------

Aorta

Root diameter, ED                          29 mm       ------------

Left atrium

Anterior-posterior dimension               37 mm       ------------

Anterior-posterior dimension index       2.04 cm/m^2   <2.2

 

DOPPLER MEASUREMENTS                                   ADULT NORMAL

Main pulmonary artery

Pressure, S                               *41 mm Hg    =30

Aortic valve

Peak velocity, S                          506 cm/s     ------------

Mean velocity, S                          368 cm/s     ------------

VTI, S                                    114 cm       ------------

Mean gradient, S                           63 mm Hg    ------------

Peak gradient, S                          113 mm Hg    ------------

Valve area, VTI                          0.61 cm^2     ------------

Valve area index, VTI                    0.34 cm^2/m^2 ------------

Valve area, Vmax                         0.53 cm^2     ------------

Valve area index, Vmax                   0.29 cm^2/m^2 ------------

Mitral valve

Peak E-wave velocity                      118 cm/s     ------------

Peak A-wave velocity                      103 cm/s     ------------

Deceleration time                         165 ms       150-230

Peak gradient, D                            6 mm Hg    ------------

Peak E/A ratio                            1.1          ------------

Tricuspid valve

Regurgitant peak velocity                 292 cm/s     ------------

Peak RV-RA gradient, S                     34 mm Hg    ------------

Maximal regurgitant velocity              292 cm/s     ------------

Systemic veins

Estimated CVP                              10 mm Hg    ------------

Right ventricle

RV pressure, S                            *44 mm Hg    <30

Pulmonic valve

Peak velocity, S                         91.1 cm/s     ------------

 

LEGEND:

Mean values are shown as u=mean value.

Asterisk (*) marks values outside specified normal range.

Prepared and signed by

 

Branden Ibrahim

[id number]

## 2017-05-31 NOTE — MB
cc:

EARL GENAO MD

****

 

 

DATE OF CONSULTATION:  5/31/2017

 

HISTORY OF PRESENT ILLNESS

This is an 84-year-old woman who was admitted to the hospital after having

problems with fatigue, tiredness and mild confusion.  Her niece insisted that

she come to the hospital and upon admission to the hospital she was found to

have be rather profoundly anemic with a hemoglobin of 4.6.  She actually feels

that this may have started six weeks ago when her water heater broke and she

had been inhaling mold.  This was repaired.

 

We have been asked to see her in that two troponins were done with values of

0.21 and 0.38 with normal CPKs.  The patient denies any problems with chest

pains or shortness of breath.  No lightheadedness or dizziness has been

present.  She denies any palpitations and no prior history of any heart

problems have been present.  She has had a history of anemia in the past which

was felt to be iron deficiency requiring transfusions.  She denies any problems

with nausea or vomiting.  No melena or hematochezia has been present.

 

PAST MEDICAL HISTORY

Otherwise significant for:

1. Hypertension.

2. Hyperlipidemia.

 

ALLERGIES

MORPHINE.

 

MEDICATIONS

Medications at home have included:

1. Atorvastatin 20 mg daily.

2. Lisinopril 20 mg daily.

3. Pantoprazole 40 mg daily.

4. Sertraline 150 mg daily.

5. Alprazolam as needed.

 

SOCIAL HISTORY

The patient is a nonsmoker.  She occasionally drinks alcohol.  She does not use

recreational drugs.

 

PHYSICAL EXAMINATION

GENERAL:  She is awake and alert.  She is in no acute distress.

VITAL SIGNS:  Blood pressure 120/70, pulse 88.

NECK:  There is no neck vein distension.  Carotids are normal.

LUNGS:  Clear.

CARDIOVASCULAR:  Regular rate and rhythm.  There is a 2/6 systolic murmur at

the apex.  No diastolic murmur is present.

ABDOMEN:  Soft.  There is no tenderness or organomegaly.

EXTREMITIES:  No edema.

 

ASSESSMENT AND RECOMMENDATIONS

The patient has had elevated troponin.  This is likely demand mediated due to

her profound anemia.  At this point in time she is asymptomatic and resting

comfortably.  At some point in the future we will entertain an ischemic

work-up, but at this point in time she certainly would not be a good candidate

for cardiac catheterization and in the absence of significant symptoms do not

feel that she urgently needs that work-up.  Would favor getting her anemia

stable and pursue outpatient examination unless symptoms intervene.  I have

ordered an echocardiogram to evaluate her murmur.

 

 

 

 

                              _________________________________

                              MD RONY Matt/ANDREW

D:  5/31/2017/10:27 AM

T:  5/31/2017/10:38 AM

Visit #:  [id number]

Job #:  [id number]

## 2017-05-31 NOTE — RADHPO
EXAM DATE/TIME:  05/31/2017 09:26 

 

[facility] COMPARISON:     

CHEST SINGLE AP, May 30, 2017, 8:36.

 

                     

INDICATIONS :     

Cough, short of breath.

                     

 

MEDICAL HISTORY :     

None.          

 

SURGICAL HISTORY :     

None.   

 

ENCOUNTER:     

Subsequent                                        

 

ACUITY:     

1 week      

 

PAIN SCORE:     

0/10

 

LOCATION:     

Bilateral chest 

 

FINDINGS:     

Cardiac silhouette is minimally enlarged. Pulmonary vascularity is indistinct with diffuse interstiti
al prominence similar to prior exam. There is subtle airspace disease in the left lower lung zone wit
h. No significant pleural effusion. Remainder of exam is unchanged.

 

CONCLUSION:     

1. Mild cardiomegaly with trace positive fluid balance.

2. Minimal left lower lobe airspace disease, likely atelectasis. However, developing aspiration/pneum
onia may have a similar appearance. 

 

 

 Anupam Sierra MD on May 31, 2017 at 9:44           

Board Certified Radiologist.

 This report was verified electronically.

## 2017-06-01 VITALS — SYSTOLIC BLOOD PRESSURE: 108 MMHG | RESPIRATION RATE: 41 BRPM | HEART RATE: 94 BPM | DIASTOLIC BLOOD PRESSURE: 65 MMHG

## 2017-06-01 VITALS
TEMPERATURE: 98 F | OXYGEN SATURATION: 98 % | HEART RATE: 92 BPM | DIASTOLIC BLOOD PRESSURE: 62 MMHG | SYSTOLIC BLOOD PRESSURE: 132 MMHG | RESPIRATION RATE: 23 BRPM

## 2017-06-01 VITALS — HEART RATE: 96 BPM

## 2017-06-01 VITALS — HEART RATE: 102 BPM

## 2017-06-01 VITALS — HEART RATE: 92 BPM | DIASTOLIC BLOOD PRESSURE: 64 MMHG | SYSTOLIC BLOOD PRESSURE: 140 MMHG | RESPIRATION RATE: 24 BRPM

## 2017-06-01 VITALS — RESPIRATION RATE: 21 BRPM | HEART RATE: 90 BPM | SYSTOLIC BLOOD PRESSURE: 121 MMHG | DIASTOLIC BLOOD PRESSURE: 68 MMHG

## 2017-06-01 VITALS
SYSTOLIC BLOOD PRESSURE: 111 MMHG | DIASTOLIC BLOOD PRESSURE: 62 MMHG | HEART RATE: 92 BPM | RESPIRATION RATE: 22 BRPM | TEMPERATURE: 98.5 F | OXYGEN SATURATION: 97 %

## 2017-06-01 VITALS — RESPIRATION RATE: 21 BRPM | DIASTOLIC BLOOD PRESSURE: 59 MMHG | SYSTOLIC BLOOD PRESSURE: 131 MMHG | HEART RATE: 88 BPM

## 2017-06-01 VITALS
TEMPERATURE: 98.6 F | RESPIRATION RATE: 34 BRPM | SYSTOLIC BLOOD PRESSURE: 147 MMHG | DIASTOLIC BLOOD PRESSURE: 75 MMHG | HEART RATE: 92 BPM

## 2017-06-01 VITALS — SYSTOLIC BLOOD PRESSURE: 139 MMHG | DIASTOLIC BLOOD PRESSURE: 72 MMHG | HEART RATE: 92 BPM | RESPIRATION RATE: 24 BRPM

## 2017-06-01 VITALS
HEART RATE: 90 BPM | OXYGEN SATURATION: 94 % | DIASTOLIC BLOOD PRESSURE: 60 MMHG | SYSTOLIC BLOOD PRESSURE: 132 MMHG | RESPIRATION RATE: 22 BRPM

## 2017-06-01 VITALS
TEMPERATURE: 98.6 F | RESPIRATION RATE: 22 BRPM | HEART RATE: 94 BPM | DIASTOLIC BLOOD PRESSURE: 71 MMHG | SYSTOLIC BLOOD PRESSURE: 144 MMHG

## 2017-06-01 VITALS
RESPIRATION RATE: 26 BRPM | SYSTOLIC BLOOD PRESSURE: 148 MMHG | DIASTOLIC BLOOD PRESSURE: 62 MMHG | HEART RATE: 96 BPM | OXYGEN SATURATION: 97 %

## 2017-06-01 VITALS
OXYGEN SATURATION: 97 % | RESPIRATION RATE: 30 BRPM | DIASTOLIC BLOOD PRESSURE: 67 MMHG | HEART RATE: 96 BPM | SYSTOLIC BLOOD PRESSURE: 131 MMHG

## 2017-06-01 VITALS
RESPIRATION RATE: 23 BRPM | OXYGEN SATURATION: 93 % | HEART RATE: 90 BPM | DIASTOLIC BLOOD PRESSURE: 55 MMHG | SYSTOLIC BLOOD PRESSURE: 103 MMHG

## 2017-06-01 VITALS — HEART RATE: 94 BPM | SYSTOLIC BLOOD PRESSURE: 137 MMHG | DIASTOLIC BLOOD PRESSURE: 64 MMHG | RESPIRATION RATE: 25 BRPM

## 2017-06-01 VITALS — OXYGEN SATURATION: 94 %

## 2017-06-01 VITALS
TEMPERATURE: 97.7 F | HEART RATE: 96 BPM | DIASTOLIC BLOOD PRESSURE: 77 MMHG | RESPIRATION RATE: 20 BRPM | SYSTOLIC BLOOD PRESSURE: 131 MMHG | OXYGEN SATURATION: 98 %

## 2017-06-01 VITALS — HEART RATE: 90 BPM

## 2017-06-01 VITALS — SYSTOLIC BLOOD PRESSURE: 137 MMHG | DIASTOLIC BLOOD PRESSURE: 63 MMHG | RESPIRATION RATE: 21 BRPM | HEART RATE: 86 BPM

## 2017-06-01 VITALS — OXYGEN SATURATION: 97 %

## 2017-06-01 LAB
ANION GAP SERPL CALC-SCNC: 10 MEQ/L (ref 5–15)
ANION GAP SERPL CALC-SCNC: 7 MEQ/L (ref 5–15)
BASOPHILS # BLD AUTO: 0.2 TH/MM3 (ref 0–0.2)
BASOPHILS NFR BLD: 2 % (ref 0–2)
BUN SERPL-MCNC: 11 MG/DL (ref 7–18)
BUN SERPL-MCNC: 17 MG/DL (ref 7–18)
CHLORIDE SERPL-SCNC: 101 MEQ/L (ref 98–107)
CHLORIDE SERPL-SCNC: 104 MEQ/L (ref 98–107)
EOSINOPHIL # BLD: 0.3 TH/MM3 (ref 0–0.4)
EOSINOPHIL NFR BLD: 2.7 % (ref 0–4)
ERYTHROCYTE [DISTWIDTH] IN BLOOD BY AUTOMATED COUNT: 15.6 % (ref 11.6–17.2)
ERYTHROCYTE [DISTWIDTH] IN BLOOD BY AUTOMATED COUNT: 17 % (ref 11.6–17.2)
GFR SERPLBLD BASED ON 1.73 SQ M-ARVRAT: 70 ML/MIN (ref 89–?)
GFR SERPLBLD BASED ON 1.73 SQ M-ARVRAT: 82 ML/MIN (ref 89–?)
HCO3 BLD-SCNC: 26.9 MEQ/L (ref 21–32)
HCO3 BLD-SCNC: 27.6 MEQ/L (ref 21–32)
HCT VFR BLD CALC: 26.8 % (ref 35–46)
HCT VFR BLD CALC: 27.3 % (ref 35–46)
HEMO FLAGS: (no result)
HEMO FLAGS: (no result)
LYMPHOCYTES # BLD AUTO: 1 TH/MM3 (ref 1–4.8)
LYMPHOCYTES NFR BLD AUTO: 10.4 % (ref 9–44)
MCH RBC QN AUTO: 27.7 PG (ref 27–34)
MCH RBC QN AUTO: 27.9 PG (ref 27–34)
MCHC RBC AUTO-ENTMCNC: 31.9 % (ref 32–36)
MCHC RBC AUTO-ENTMCNC: 32.5 % (ref 32–36)
MCV RBC AUTO: 85.7 FL (ref 80–100)
MCV RBC AUTO: 86.9 FL (ref 80–100)
MONOCYTES NFR BLD: 6.3 % (ref 0–8)
NEUTROPHILS # BLD AUTO: 7.9 TH/MM3 (ref 1.8–7.7)
NEUTROPHILS NFR BLD AUTO: 78.6 % (ref 16–70)
NEUTS BAND # BLD MANUAL: 11.5 TH/MM3 (ref 1.8–7.7)
NEUTS BAND NFR BLD: 1 % (ref 0–6)
NEUTS SEG NFR BLD MANUAL: 82 % (ref 16–70)
PLAT MORPH BLD: NORMAL
PLATELET # BLD: 276 TH/MM3 (ref 150–450)
PLATELET # BLD: 281 TH/MM3 (ref 150–450)
PLATELET BLD QL SMEAR: NORMAL
POTASSIUM SERPL-SCNC: 2.8 MEQ/L (ref 3.5–5.1)
POTASSIUM SERPL-SCNC: 4.1 MEQ/L (ref 3.5–5.1)
RBC # BLD AUTO: 3.09 MIL/MM3 (ref 4–5.3)
RBC # BLD AUTO: 3.18 MIL/MM3 (ref 4–5.3)
SCAN/DIFF: (no result)
SODIUM SERPL-SCNC: 138 MEQ/L (ref 136–145)
SODIUM SERPL-SCNC: 139 MEQ/L (ref 136–145)
WBC # BLD AUTO: 10.1 TH/MM3 (ref 4–11)
WBC # BLD AUTO: 13.9 TH/MM3 (ref 4–11)
WBC DIFF SAMPLE: 100
WBC NRBC COR # BLD: 1 /100 WBC (ref 0–0)

## 2017-06-01 PROCEDURE — 0DJD8ZZ INSPECTION OF LOWER INTESTINAL TRACT, VIA NATURAL OR ARTIFICIAL OPENING ENDOSCOPIC: ICD-10-PCS | Performed by: INTERNAL MEDICINE

## 2017-06-01 PROCEDURE — 0DB98ZX EXCISION OF DUODENUM, VIA NATURAL OR ARTIFICIAL OPENING ENDOSCOPIC, DIAGNOSTIC: ICD-10-PCS | Performed by: INTERNAL MEDICINE

## 2017-06-01 RX ADMIN — SERTRALINE HYDROCHLORIDE SCH MG: 100 TABLET, FILM COATED ORAL at 08:30

## 2017-06-01 RX ADMIN — Medication SCH TAB: at 08:30

## 2017-06-01 RX ADMIN — STANDARDIZED SENNA CONCENTRATE AND DOCUSATE SODIUM SCH TAB: 8.6; 5 TABLET, FILM COATED ORAL at 21:00

## 2017-06-01 RX ADMIN — POTASSIUM CHLORIDE SCH MLS/HR: 200 INJECTION, SOLUTION INTRAVENOUS at 12:00

## 2017-06-01 RX ADMIN — ATORVASTATIN CALCIUM SCH MG: 10 TABLET, FILM COATED ORAL at 22:51

## 2017-06-01 RX ADMIN — Medication SCH ML: at 08:31

## 2017-06-01 RX ADMIN — ACETAMINOPHEN PRN MG: 325 TABLET ORAL at 17:10

## 2017-06-01 RX ADMIN — PANTOPRAZOLE SODIUM SCH MLS/HR: 40 INJECTION, POWDER, FOR SOLUTION INTRAVENOUS at 04:58

## 2017-06-01 RX ADMIN — CHLORHEXIDINE GLUCONATE SCH PACK: 500 CLOTH TOPICAL at 22:51

## 2017-06-01 RX ADMIN — STANDARDIZED SENNA CONCENTRATE AND DOCUSATE SODIUM SCH TAB: 8.6; 5 TABLET, FILM COATED ORAL at 08:30

## 2017-06-01 RX ADMIN — CHLORHEXIDINE GLUCONATE SCH PACK: 500 CLOTH TOPICAL at 04:00

## 2017-06-01 RX ADMIN — POTASSIUM CHLORIDE SCH MLS/HR: 200 INJECTION, SOLUTION INTRAVENOUS at 10:00

## 2017-06-01 RX ADMIN — Medication SCH ML: at 22:51

## 2017-06-01 NOTE — GIPROC
[facility]

[address] [place] [address], [id number]

 

 

ENTEROSCOPY PROCEDURE REPORT     EXAM DATE: 06/01/2017

 

 

PATIENT NAME:          Lisa Dillon          MR#:       [id number]

YOB: 1932     VISIT #:     [id number]

ATTENDING:     Birdie Rubio MD     ORDER #:     [id number]

ASSISTANT:      Preeti Campbell and Abigail Galan      STATUS:

inpatient

 

INDICATIONS:  The patient is a 84 yr old female here for an enteroscopy

procedure due to anemia

PROCEDURE PERFORMED:     Small bowel enteroscopy with biopsy

 

MEDICATIONS:     None and Per Anesthesia.

 

CONSENT: The patient understands the risks and benefits of the procedure and

understands that these risks include, but are not limited to: sedation,

allergic reaction, infection, perforation and/or bleeding. Alternative means of

evaluation and treatment include, among others: physical exam, x-rays, and/or

surgical intervention. The patient elects to proceed with this endoscopic

procedure.

 



medical equipment was checked for proper function. Hand hygiene and appropriate

measures for infection prevention was taken. After the risks, benefits and

alternatives of the procedure were thoroughly explained, Informed consent was

verified, confirmed and timeout was successfully executed by the treatment

team. The EC-[id number] (Pedi C) endoscope was introduced through the mouth and

advanced to the jejunum. The prep was good. The instrument was then slowly

withdrawn while examining the mucosa circumferentially. The scope was then

completely withdrawn from the patient and the procedure terminated. The pulse,

BP, and O2 saturation were monitored and documented by the physician and the

nursing staff throughout the entire procedure.  The patient was cared for as

planned according to standard protocol, then discharged to recovery in stable

condition and with appropriate post procedure care.

 

 

thickened fold in the duodenum Bx done otherwise normal

 

 

 

ADVERSE EVENTS:      There were no complications.

IMPRESSIONS:

 

RECOMMENDATIONS:     Await biopsy results

RECALL:     procedure as needed

 

_____________________________

Birdie Rubio MD

eSigned:  Birdie Rubio MD 06/01/2017 7:56 AM

 

 

cc:

## 2017-06-01 NOTE — RADHPO
EXAM DATE/TIME:  06/01/2017 20:43 

 

[facility] COMPARISON:     

CT ABDOMEN & PELVIS W CONTRAST, September 23, 2016, 19:35.

 

 

INDICATIONS :     

Adhesions in colon

                      

 

IV CONTRAST:     

94 cc Omnipaque 350 (iohexol) IV 

 

 

ORAL CONTRAST:      

Prescribed oral contrast ingested.

                      

 

RADIATION DOSE:     

11.53 CTDIvol (mGy) 

 

 

MEDICAL HISTORY :     

Cardiovascular disease. Hypothyroidism. 

 

SURGICAL HISTORY :      

Hysterectomy. Colonoscopy

 

ENCOUNTER:      

Initial

 

ACUITY:      

1 day

 

PAIN SCALE:      

5/10

 

LOCATION:         

Abdomen/pelvis

 

TECHNIQUE:     

Volumetric scanning of the abdomen and pelvis was performed.  Using automated exposure control and ad
justment of the mA and/or kV according to patient size, radiation dose was kept as low as reasonably 
achievable to obtain optimal diagnostic quality images. 

 

FINDINGS:     

 

LOWER LUNGS:     

A small right-sided pleural effusion and tiny left-sided pleural effusion. There is associated atelec
tasis bilaterally.

 

LIVER:     

There is a 2.1 cm cyst involving the left lobe. Hounsfield units are zero. This is stable from the pr
ior study. No solid lesions. Portal vein is patent. Several calcified gallstones are layering within 
an otherwise normal-appearing gallbladder

 

SPLEEN:     

A 1.5 cm low-density lesion is stable within the spleen. The spleen is otherwise unremarkable.

 

PANCREAS:     

Within normal limits.

 

KIDNEYS:     

Normal in size and shape.  There is no mass, stone or hydronephrosis. A 6.2 cm cyst involving the rig
ht kidney.

 

ADRENAL GLANDS:     

Within normal limits.

 

VASCULAR:     

Diffuse calcified plaque of the aorta and major branches. No aneurysmal change.

 

BOWEL/MESENTERY:     

Because of thickening is seen involving the sigmoid colon without stranding of the adjacent fat. This
 is unchanged in the prior study. Numerous colonic diverticuli within the sigmoid region and scattere
d throughout the remaining aspects the colon. No free air or free fluid. No dilatation of the bowel. 
Small bowel is unremarkable.

 

ABDOMINAL WALL:     

Within normal limits.

 

RETROPERITONEUM:     

There is no lymphadenopathy. 

 

BLADDER:     

No wall thickening or mass. 

 

REPRODUCTIVE:     

Within normal limits.

 

INGUINAL:     

There is no lymphadenopathy or hernia. 

 

MUSCULOSKELETAL:     

A degenerative and scoliotic spine.

 

CONCLUSION:     

1. No dilatation of the bowel.

2. Chronic wall thickening involving the sigmoid colon with numerous diverticuli. This likely relates
 to scarring from prior diverticulitis.

3. Cholelithiasis.

4. Tiny left and small right pleural effusion with associated atelectasis.

5. Stable hepatic and splenic cysts.

 

 

 

 Theodore Duran Jr., MD on June 01, 2017 at 21:39           

Board Certified Radiologist.

 This report was verified electronically.

## 2017-06-01 NOTE — HHI.PR
Subjective


Remarks


no new complaints





Objective


Vitals





 Vital Signs








  Date Time  Temp Pulse Resp B/P Pulse Ox O2 Delivery O2 Flow Rate FiO2


 


6/1/17 15:00  86 21 137/63    


 


6/1/17 14:00  90 21 121/68    


 


6/1/17 14:00  90      


 


6/1/17 13:00  90 23 103/55 93   


 


6/1/17 12:00  92      


 


6/1/17 12:00 98.5 92 22 111/62 97   


 


6/1/17 11:00  90 22 132/60 94   


 


6/1/17 10:00  102      


 


6/1/17 10:00  102      


 


6/1/17 09:00  94 41 108/65    


 


6/1/17 08:00 98.0 92 23 132/62 98   


 


6/1/17 08:00  92      


 


6/1/17 06:00  96      


 


6/1/17 06:00  96 30 131/67 97   


 


6/1/17 05:00  92 24 139/72    


 


6/1/17 04:00  94      


 


6/1/17 04:00 98.6 94 22 144/71    


 


6/1/17 03:00  94 25 137/64    


 


6/1/17 02:00  96      


 


6/1/17 02:00  96 26 148/62 97   


 


6/1/17 01:21  92 24 140/64    


 


6/1/17 00:28  88 21 131/59    


 


6/1/17 00:00  96      


 


5/31/17 23:27 98.3 90 24 135/67 95   


 


5/31/17 23:00  88      


 


5/31/17 23:00  88 24 138/77 98   


 


5/31/17 22:15     97   21


 


5/31/17 22:09  84 21 147/63 96   


 


5/31/17 22:00  90      


 


5/31/17 22:00  90 24 200/181 97   


 


5/31/17 20:00  88      


 


5/31/17 19:21 97.3 88 28 133/67 98   


 


5/31/17 18:00  92 38 114/55 94   


 


5/31/17 18:00  92      


 


5/31/17 17:00  86 29 112/58 96   














 5/31/17 5/31/17 6/1/17





 15:00 23:00 07:00


 


Intake Total 742 ml 2351 ml 80 ml


 


Output Total 1050 ml 600 ml 


 


Balance -308 ml 1751 ml 80 ml


 


   


 


Intake Oral 650 ml 2000 ml 0 ml


 


IV Total 92 ml 101 ml 80 ml


 


Packed Cells  250 ml 


 


Output Urine Total 1050 ml 600 ml 


 


# Voids 3 4 9


 


# Bowel Movements 1 4 9








Result Diagram:  


6/1/17 0440                                                                    

            6/1/17 0440





Imaging





Last Impressions








Chest X-Ray 5/31/17 0000 Signed





Impressions: 





 Service Date/Time:  Wednesday, May 31, 2017 09:26 - CONCLUSION:  1. Mild 





 cardiomegaly with trace positive fluid balance. 2. Minimal left lower lobe 





 airspace disease, likely atelectasis. However, developing aspiration/pneumonia 





 may have a similar appearance.     Anupam Sierra MD 








Objective Remarks


Lying in bed looks comfortable , off oxygen


cta 


jenni 3/6


+bs nontender


no edema





A/P


Problem List:  


(1) Symptomatic anemia


Status:  Acute


Plan:  received total of 3 units, feels better and stronger , egd/enteroscopsy/

colonoscopy done today, Colonoscopy incomplete and ct scan of abdomen pending


h/h dropped this am after prep, repeating cbc , clinically much improved





(2) GI bleed


Status:  Acute


Plan:  she has had recurrent gi bleeds , she has severe diverticulosis, she 

denies abdominal pain except for occasional cramping follwed by diarrhea, this 

is chronic


 ct scan pending





(3) Anxiety


Status:  Chronic


Plan:  mouring loss of her  and friends, neighbores relocating


case management assisting with obtaining help at home, discussed shelter





(4) Troponin level elevated


Status:  Acute


Plan:  Asked cardiology to see her  though suspect related to stress from anemia

, they have ordered echo and outpatient ischemic w/u. For now work on 

correcting anemia





(5) Hypokalemia


Status:  Acute


Plan:  potassium 2.8 this am, replaced , will recheck to ensure correction








Problem Qualifiers





(1) GI bleed:  


Qualified Code:  K92.1 - Gastrointestinal hemorrhage with melena





Flower Gamez MD Jun 1, 2017 17:05

## 2017-06-01 NOTE — GIPROC
[facility]

[address] [place] [address], [address]

 

 

COLONOSCOPY PROCEDURE REPORT     EXAM DATE: 06/01/2017

 

PATIENT NAME:      Lisa Dillon           MR #:      [id number]

YOB: 1932      VISIT #:     [id number]

ENDOSCOPIST:     Birdie Rubio MD     ORDER #:     [id number]

ASSISTANT:      Preeti Campbell and Abigail Galan     STATUS:

inpatient

 

INDICATIONS:  The patient is a 84 yr old female here for a colonoscopy due to

anemia, non-specific

PROCEDURE PERFORMED:     Colonoscopy, incomplete

MEDICATIONS:     None and Per Anesthesia.

PREP QUALITY:     good

ESTIMATED BLOOD LOSS:     None

 

CONSENT: The patient understands the risks and benefits of the procedure and

understands that these risks include, but are not limited to: sedation,

allergic reaction, infection, perforation and/or bleeding. Alternative means of

evaluation and treatment include, among others: physical exam, x-rays, and/or

surgical intervention. The patient elects to proceed with this endoscopic

procedure.

 



medical equipment was checked for proper function. Hand hygiene and appropriate

measures for infection prevention was taken. After the risks, benefits and

alternatives of the procedure were thoroughly explained, Informed consent was

verified, confirmed and timeout was successfully executed by the treatment

team. A digital exam revealed no abnormalities of the rectum The [employer]

[id number] endoscope was introduced through the anus and advanced to the sigmoid

colon. The instrument was then slowly withdrawn as the colon was fully

examined.

 

 

COLON FINDINGS: I was not able to pass scope byond the sigmoid 25 cm secondary

to either twisted colon, spazem or external compression, I did not feel safe to

push the scope without seeing the lumen because of fear of perforation so

endoscopy was terminated. Retroflexed views revealed no abnormalities The scope

was then completely withdrawn from the patient and the procedure terminated.

 

 

 

 

ADVERSE EVENTS:      There were no complications.

IMPRESSIONS:     1.  I was not able to pass scope byond the sigmoid 25 cm

secondary to either twisted colon, spazem or external compression, I did not

feel safe to push the scope without seeing the lumen because of fear of

perforation so endoscopy was terminated

2.  Retroflexed views revealed no abnormalities

3.  Revealed no abnormalities of the rectum

 

RECOMMENDATIONS:     Clear liquid

CT of abdomen with IV and PO contrast

RECALL:

 

_____________________________

Birdie Rubio MD

eSigned:  Birdie Rubio MD 06/01/2017 7:53 AM

 

 

cc:

## 2017-06-01 NOTE — MB
cc:

SHARLENE FERNANDES M.D.

****

 

 

DATE OF BIRTH

1932

 

REFERRING PHYSICIAN

Dr. Gamez.

 

DATE OF CONSULTATION

05/31/2017

 

REASON FOR REFERRAL

Anemia.

 

Thank you for the consultation.

 

HISTORY OF PRESENT ILLNESS

An 84-year-old lady who has been doing well until last month.  The patient

started having weakness and then she was told by her friend that she was very

pale.  She was short of breath and she came to the emergency room because of

that and was found to have severe anemia.  She was complaining of shortness

breath, dyspnea on exertion and fatigue, found to have hemoglobin of less than

5.  The patient is known to have recurrent GI bleed.  She had an upper

endoscopy and a colonoscopy in the past which was unrevealing.  She was

supposed to have capsule endoscopy but then she never followed up and

apparently, according to her, her colonoscopy was not very clean at the time

she was done.

 

The patient stated that she did not see any blood in her stool.  She did not

have any hematochezia, no nausea, no vomiting, no hematemesis.

 

PAST SURGICAL HISTORY

Significant for -

1. Arthroscopy.

2. Cataract surgery.

3. Hysterectomy.

 

PAST MEDICAL HISTORY

Significant for -

1. Diverticular disease.

2. Chronic anemia.

3. Valdez esophagus.

4. Colon polyps.

5. Hypertension.

6. Hyperlipidemia.

7. Lumbar stenosis.

8. Major depression.

 

ALLERGIES

MORPHINE.

 

MEDICATIONS

Reviewed in the chart.

 

SOCIAL HISTORY

No tobacco.  Rare alcohol.

 

REVIEW OF SYSTEMS

All 12-points negative except HPI.

 

PHYSICAL EXAMINATION

GENERAL:  Alert, oriented, in no acute distress at this time.  She feels better

since she received 2 units of blood.

VITAL SIGNS:  Stable.  No hypertension.

HEENT:  Pupils are round, reactive to light.

NECK:  Supple.

CHEST:  Clear to auscultation and precaution.

CARDIAC:  Regular rate and rhythm with a 2/6 systolic ejection murmur.

ABDOMEN:  Soft, nondistended.  Positive bowel sounds.  EXTREMITIES:  No edema,

clubbing or cyanosis.

NEUROLOGIC:  Intact.

PSYCHOLOGIC:  Appropriate.

 

LABORATORY DATA

White count 13.9, hemoglobin 7.4, on admission was 4.6, platelets 307.

INR 0.9.

Liver function tests normal.

 

ASSESSMENT AND PLAN

1. An 84-year-old lady with severe anemia causing her to have elevation of

   troponin.  She was seen by Cardiology and felt that the troponin elevation

   is secondary to stress from the anemia.

2. This could be from the GI source of small bowel but also it could be from

   upper or the colon.  I had a discussion with the patient about doing upper

   endoscopy and colonoscopy.  She is agreeable to have it done.  She will have

   the prep done and we will plan on doing the procedures tomorrow.  She feels

   much better since she received the blood and no chest pain, no shortness of

   breath and we will proceed with the procedure.

 

 

 

 

 

                              _________________________________

                              MD CARLOS ALBERTO Bundy/DIPTI

D:  5/31/2017/7:22 PM

T:  6/1/2017/6:06 AM

Visit #:  [id number]

Job #:  [id number]

## 2017-06-02 VITALS
TEMPERATURE: 97.6 F | OXYGEN SATURATION: 94 % | RESPIRATION RATE: 18 BRPM | DIASTOLIC BLOOD PRESSURE: 88 MMHG | SYSTOLIC BLOOD PRESSURE: 146 MMHG | HEART RATE: 93 BPM

## 2017-06-02 VITALS
DIASTOLIC BLOOD PRESSURE: 80 MMHG | TEMPERATURE: 97.7 F | SYSTOLIC BLOOD PRESSURE: 144 MMHG | OXYGEN SATURATION: 96 % | RESPIRATION RATE: 18 BRPM | HEART RATE: 92 BPM

## 2017-06-02 VITALS
HEART RATE: 86 BPM | OXYGEN SATURATION: 96 % | RESPIRATION RATE: 18 BRPM | DIASTOLIC BLOOD PRESSURE: 84 MMHG | SYSTOLIC BLOOD PRESSURE: 142 MMHG | TEMPERATURE: 98.2 F

## 2017-06-02 VITALS
RESPIRATION RATE: 18 BRPM | DIASTOLIC BLOOD PRESSURE: 76 MMHG | TEMPERATURE: 96.6 F | HEART RATE: 99 BPM | SYSTOLIC BLOOD PRESSURE: 144 MMHG | OXYGEN SATURATION: 94 %

## 2017-06-02 VITALS — HEART RATE: 94 BPM

## 2017-06-02 VITALS — HEART RATE: 92 BPM

## 2017-06-02 VITALS — HEART RATE: 90 BPM

## 2017-06-02 LAB
ANION GAP SERPL CALC-SCNC: 7 MEQ/L (ref 5–15)
BASOPHILS # BLD AUTO: 0.1 TH/MM3 (ref 0–0.2)
BASOPHILS NFR BLD: 1.1 % (ref 0–2)
BUN SERPL-MCNC: 9 MG/DL (ref 7–18)
CHLORIDE SERPL-SCNC: 106 MEQ/L (ref 98–107)
EOSINOPHIL # BLD: 0.4 TH/MM3 (ref 0–0.4)
EOSINOPHIL NFR BLD: 4.5 % (ref 0–4)
ERYTHROCYTE [DISTWIDTH] IN BLOOD BY AUTOMATED COUNT: 15.8 % (ref 11.6–17.2)
GFR SERPLBLD BASED ON 1.73 SQ M-ARVRAT: 85 ML/MIN (ref 89–?)
HCO3 BLD-SCNC: 26.3 MEQ/L (ref 21–32)
HCT VFR BLD CALC: 26.3 % (ref 35–46)
HEMO FLAGS: (no result)
LYMPHOCYTES # BLD AUTO: 1.4 TH/MM3 (ref 1–4.8)
LYMPHOCYTES NFR BLD AUTO: 15.2 % (ref 9–44)
MCH RBC QN AUTO: 29.1 PG (ref 27–34)
MCHC RBC AUTO-ENTMCNC: 33.5 % (ref 32–36)
MCV RBC AUTO: 86.8 FL (ref 80–100)
MONOCYTES NFR BLD: 6.6 % (ref 0–8)
NEUTROPHILS # BLD AUTO: 6.9 TH/MM3 (ref 1.8–7.7)
NEUTROPHILS NFR BLD AUTO: 72.6 % (ref 16–70)
PLATELET # BLD: 295 TH/MM3 (ref 150–450)
POTASSIUM SERPL-SCNC: 3.8 MEQ/L (ref 3.5–5.1)
RBC # BLD AUTO: 3.03 MIL/MM3 (ref 4–5.3)
SODIUM SERPL-SCNC: 139 MEQ/L (ref 136–145)
WBC # BLD AUTO: 9.4 TH/MM3 (ref 4–11)

## 2017-06-02 RX ADMIN — STANDARDIZED SENNA CONCENTRATE AND DOCUSATE SODIUM SCH TAB: 8.6; 5 TABLET, FILM COATED ORAL at 09:00

## 2017-06-02 RX ADMIN — Medication SCH ML: at 11:06

## 2017-06-02 RX ADMIN — SERTRALINE HYDROCHLORIDE SCH MG: 100 TABLET, FILM COATED ORAL at 11:05

## 2017-06-02 NOTE — HHI.PR
Subjective


Remarks


Hungry, no other complaints.





Objective


Vitals





 Vital Signs








  Date Time  Temp Pulse Resp B/P Pulse Ox O2 Delivery O2 Flow Rate FiO2


 


6/2/17 08:00 96.6 99 18 144/76 94   


 


6/2/17 04:00 98.2 86 18 142/84 96   


 


6/2/17 00:00 97.7 92 18 144/80 96   


 


6/1/17 20:00 97.7 93 20 131/77 98   


 


6/1/17 20:00  96      


 


6/1/17 19:30     97   21


 


6/1/17 18:00  90      


 


6/1/17 17:48     94   21


 


6/1/17 16:00  92      


 


6/1/17 16:00 98.6 92 34 147/75    


 


6/1/17 15:00  86 21 137/63    


 


6/1/17 14:00  90 21 121/68    


 


6/1/17 14:00  90      


 


6/1/17 13:00  90 23 103/55 93   














 6/1/17 6/1/17 6/2/17





 15:00 23:00 07:00


 


Intake Total 320 ml 480 ml 480 ml


 


Balance 320 ml 480 ml 480 ml


 


   


 


Intake Oral 120 ml 480 ml 480 ml


 


IV Total 200 ml  


 


# Voids 4 3 3


 


# Bowel Movements 3 3 3








Result Diagram:  


6/2/17 0555                                                                    

            6/2/17 0555





Imaging





Last Impressions








Abdomen/Pelvis CT 6/1/17 0000 Signed





Impressions: 





 Service Date/Time:  Thursday, June 1, 2017 20:43 - CONCLUSION:  1. No 

dilatation 





 of the bowel. 2. Chronic wall thickening involving the sigmoid colon with 





 numerous diverticuli. This likely relates to scarring from prior 

diverticulitis. 





 3. Cholelithiasis. 4. Tiny left and small right pleural effusion with 

associated 





 atelectasis. 5. Stable hepatic and splenic cysts.     Theodore Duran Jr., MD 


 


Chest X-Ray 5/31/17 0000 Signed





Impressions: 





 Service Date/Time:  Wednesday, May 31, 2017 09:26 - CONCLUSION:  1. Mild 





 cardiomegaly with trace positive fluid balance. 2. Minimal left lower lobe 





 airspace disease, likely atelectasis. However, developing aspiration/pneumonia 





 may have a similar appearance.     Anupam Sierra MD 








Last Impressions








Chest X-Ray 5/31/17 0000 Signed





Impressions: 





 Service Date/Time:  Wednesday, May 31, 2017 09:26 - CONCLUSION:  1. Mild 





 cardiomegaly with trace positive fluid balance. 2. Minimal left lower lobe 





 airspace disease, likely atelectasis. However, developing aspiration/pneumonia 





 may have a similar appearance.     Anupam Sierra MD 








Objective Remarks


Lying in bed looks comfortable , 


cta 


jenni 2/6


+bs nontender


no edema





A/P


Problem List:  


(1) Symptomatic anemia


Status:  Resolved


Plan:  received total of 3 units, feels better and stronger , egd/enteroscopsy/

colonoscopy done today, Colonoscopy incomplete and ct scan of abdomen pending


h/h dropped this am after prep, cbc stable, ct scan showed colonic wall 

thickening in sigmoid, gi wants her to get pill endoscopy and she has agreed





(2) GI bleed


Status:  Resolved


Plan:  she has had recurrent gi bleeds , she has severe diverticulosis, she 

denies abdominal pain except for occasional cramping follwed by diarrhea, this 

is chronic


 ct scan colonic wall thickening, gallstones, small pleural effusions, h/h 

stable


no active bleeding was seen by GI. Will need pill endoscopy as outpatient





(3) Anxiety


Status:  Chronic


Plan:  mouring loss of her  and friends, neighbores relocating


case management assisting with obtaining help at home, discussed half-way





(4) Troponin level elevated


Status:  Acute


Plan:  Asked cardiology to see her  though suspect related to stress from anemia

, they have ordered echo and outpatient ischemic w/u. For now work on 

correcting anemia





(5) Hypokalemia


Status:  Resolved


Plan:  potassium 2.8 this am, replaced , potassium 3.8 today








Problem Qualifiers





(1) GI bleed:  


Qualified Code:  K92.1 - Gastrointestinal hemorrhage with melena





Flower Gamez MD Jun 2, 2017 12:29

## 2017-06-02 NOTE — PD.CARD.PN
Subjective


Subjective Remarks


Feels well. No chest pain. Echo shows normal LV function with mild MR and TR





Objective


Vital Signs / I&O





 Vital Signs








  Date Time  Temp Pulse Resp B/P Pulse Ox O2 Delivery O2 Flow Rate FiO2


 


6/2/17 04:00 98.2 86 18 142/84 96   


 


6/2/17 00:00 97.7 92 18 144/80 96   


 


6/1/17 20:00 97.7 93 20 131/77 98   


 


6/1/17 20:00  96      


 


6/1/17 19:30     97   21


 


6/1/17 18:00  90      


 


6/1/17 17:48     94   21


 


6/1/17 16:00  92      


 


6/1/17 16:00 98.6 92 34 147/75    


 


6/1/17 15:00  86 21 137/63    


 


6/1/17 14:00  90 21 121/68    


 


6/1/17 14:00  90      


 


6/1/17 13:00  90 23 103/55 93   


 


6/1/17 12:00  92      


 


6/1/17 12:00 98.5 92 22 111/62 97   


 


6/1/17 11:00  90 22 132/60 94   


 


6/1/17 10:00  102      


 


6/1/17 10:00  102      


 


6/1/17 09:00  94 41 108/65    


 


6/1/17 08:00 98.0 92 23 132/62 98   


 


6/1/17 08:00  92      








 I/O








 6/1/17 6/1/17 6/1/17 6/2/17 6/2/17 6/2/17





 07:00 15:00 23:00 07:00 15:00 23:00


 


Intake Total 80 ml 320 ml 480 ml 480 ml  


 


Balance 80 ml 320 ml 480 ml 480 ml  


 


      


 


Intake Oral 0 ml 120 ml 480 ml 480 ml  


 


IV Total 80 ml 200 ml    


 


# Voids 9 4 3 3  


 


# Bowel Movements 9 3 3 3  








Physical Exam


Lungs clear


RRR 1/6 systolic murmur


Laboratory





Laboratory Tests








Test 6/1/17 6/2/17





 16:45 05:55


 


White Blood Count 10.1 TH/MM3 9.4 TH/MM3


 


Red Blood Count 3.09 MIL/MM3 3.03 MIL/MM3


 


Hemoglobin 8.5 GM/DL 8.8 GM/DL


 


Hematocrit 26.8 % 26.3 %


 


Mean Corpuscular Volume 86.9 FL 86.8 FL


 


Mean Corpuscular Hemoglobin 27.7 PG 29.1 PG


 


Mean Corpuscular Hemoglobin 31.9 % 33.5 %





Concent  


 


Red Cell Distribution Width 17.0 % 15.8 %


 


Platelet Count 276 TH/MM3 295 TH/MM3


 


Mean Platelet Volume 8.7 FL 8.4 FL


 


Neutrophils (%) (Auto) 78.6 % 72.6 %


 


Lymphocytes (%) (Auto) 10.4 % 15.2 %


 


Monocytes (%) (Auto) 6.3 % 6.6 %


 


Eosinophils (%) (Auto) 2.7 % 4.5 %


 


Basophils (%) (Auto) 2.0 % 1.1 %


 


Neutrophils # (Auto) 7.9 TH/MM3 6.9 TH/MM3


 


Lymphocytes # (Auto) 1.0 TH/MM3 1.4 TH/MM3


 


Monocytes # (Auto) 0.6 TH/MM3 0.6 TH/MM3


 


Eosinophils # (Auto) 0.3 TH/MM3 0.4 TH/MM3


 


Basophils # (Auto) 0.2 TH/MM3 0.1 TH/MM3


 


CBC Comment DIFF FINAL  DIFF FINAL 


 


Differential Comment    


 


Sodium Level 138 MEQ/L 139 MEQ/L


 


Potassium Level 4.1 MEQ/L 3.8 MEQ/L


 


Chloride Level 104 MEQ/L 106 MEQ/L


 


Carbon Dioxide Level 26.9 MEQ/L 26.3 MEQ/L


 


Anion Gap 7 MEQ/L 7 MEQ/L


 


Blood Urea Nitrogen 11 MG/DL 9 MG/DL


 


Creatinine 0.68 MG/DL 0.66 MG/DL


 


Estimat Glomerular Filtration 82 ML/MIN 85 ML/MIN





Rate  


 


Random Glucose 111 MG/DL 94 MG/DL


 


Calcium Level 8.1 MG/DL 8.4 MG/DL











Assessment and Plan


Assessment and Plan


Stable CV. Feel troponin elevation secondary to demand. Once patient stable 

will plan out-patient stress test to r/o occult CAD. Feel intensity of murmur 

on admission due to hyperdynamic state from her profound anemia. Will sign off 

and plan to see as out-patient








Yoel Meehan MD Jun 2, 2017 07:44

## 2017-06-23 NOTE — HHI.DS
Discharge Summary


Admission Date


May 30, 2017 at 10:34


Discharge Date:  Jun 2, 2017


Admitting Diagnosis


GI Bleed





(1) Symptomatic anemia


Diagnosis:  Principal





(2) GI bleed


Diagnosis:  Principal





(3) Anxiety


Diagnosis:  Secondary





(4) Troponin level elevated


Diagnosis:  Secondary





(5) Hypokalemia


Diagnosis:  Secondary





Consultants


Dr Meehan cardiology


Dr [facility]


Procedures


colonoscopy


Brief History


83 y/o female brought to Er byu family for generalized weakness and cough with 

sob and decrease oral intake. Prior hx of iron deficiency anemia and gi bleed. 

In ER found to have hgb of 4.6. Shewas admitted for transfusions and evaluation 

of possible gi source. She had an initial troponin of .21.


Imaging





Last Impressions








Abdomen/Pelvis CT 6/1/17 0000 Signed





Impressions: 





 Service Date/Time:  Thursday, June 1, 2017 20:43 - CONCLUSION:  1. No 

dilatation 





 of the bowel. 2. Chronic wall thickening involving the sigmoid colon with 





 numerous diverticuli. This likely relates to scarring from prior 

diverticulitis. 





 3. Cholelithiasis. 4. Tiny left and small right pleural effusion with 

associated 





 atelectasis. 5. Stable hepatic and splenic cysts.     Theodore Duran Jr., MD 


 


Chest X-Ray 5/31/17 0000 Signed





Impressions: 





 Service Date/Time:  Wednesday, May 31, 2017 09:26 - CONCLUSION:  1. Mild 





 cardiomegaly with trace positive fluid balance. 2. Minimal left lower lobe 





 airspace disease, likely atelectasis. However, developing aspiration/pneumonia 





 may have a similar appearance.     Anupam Sierra MD 








PE at Discharge


Lying in bed looks comfortable , 


cta 


jneni 2/6


+bs nontender


no edema


Hospital Course


Patient admitted to the ICU. Transfused PRBC's. Required diuresis after 

transfusion due to volume overload. GI was consulted and patient agreed to 

colonoscopy thart was performed. Incomplete due to narrow lumen. CT scan abd/

pelvis showed thickening of the bowel wall unchanged from before. GI agreed to 

discharge with outpatient f/u for pill endoscopy. Her second troponin was 

slievghtly elevated as well .She was seen by cardiology who ordered echo and 

felt this could be followed up as outpatient once her anemia stabilized. Her 

echo showed ef of 55-60 with no regional wall motion abnormalities.Her 

potassium dropped with the diuresis and this required correction prior to 

discharge.


Pt Condition on Discharge:  Fair


Discharge Disposition:  Discharge Home


Discharge Instructions


DIET: Follow Instructions for:  Heart Healthy Diet


Activities you can perform:  Regular-No Restrictions


Follow up Referrals:  


[facility]


Gastroenterology - 1 Week with Birdie Rubio MD


PCP Follow-up - 06/07/17 with Flower Gamez MD





New Medications:  


Polysaccharide Iron Complex (Poly-Iron 150) 150 Mg Cap


150 MG PO ONCE anemia #30 CAP


 


Changed Medications:  


Sertraline (Sertraline) 100 Mg Tab


150 MG PO DAILY depression #30 Ref 0 TAB (Changed from: 100 MG)


 


Continued Medications:  


Alprazolam (Xanax) 0.25 Mg Tab


0.25 MG PO AS DIRECTED PRN ANXIETY Ref 0 TAB


Atorvastatin (Lipitor) 10 Mg Tab


10 MG PO HS Cholesterol Management #30 Ref 0 TAB


Cetirizine (Cetirizine) 10 Mg Tab


10 MG PO DAILY Allergies Ref 0 TAB


Lisinopril (Lisinopril) 10 Mg Tab


10 MG PO DAILY #30 Ref 0 TAB


Multiple Vitamins W/ Minerals (Ocuvite) 1 Tab


1 TAB PO DAILY Nutritional Supplement Ref 0 TAB


Pantoprazole (Protonix) 40 Mg Tab


40 MG PO DAILY Reflux #30 Ref 0 TAB











Flower Gamez MD Jun 23, 2017 18:22

## 2017-07-20 ENCOUNTER — HOSPITAL ENCOUNTER (OUTPATIENT)
Dept: HOSPITAL 17 - PHED | Age: 82
Setting detail: OBSERVATION
LOS: 3 days | Discharge: HOME | End: 2017-07-23
Attending: INTERNAL MEDICINE | Admitting: INTERNAL MEDICINE
Payer: MEDICARE

## 2017-07-20 VITALS
SYSTOLIC BLOOD PRESSURE: 138 MMHG | OXYGEN SATURATION: 99 % | RESPIRATION RATE: 18 BRPM | DIASTOLIC BLOOD PRESSURE: 73 MMHG | HEART RATE: 94 BPM

## 2017-07-20 VITALS
OXYGEN SATURATION: 96 % | SYSTOLIC BLOOD PRESSURE: 129 MMHG | HEART RATE: 100 BPM | RESPIRATION RATE: 18 BRPM | TEMPERATURE: 97.4 F | DIASTOLIC BLOOD PRESSURE: 70 MMHG

## 2017-07-20 VITALS
RESPIRATION RATE: 20 BRPM | OXYGEN SATURATION: 100 % | SYSTOLIC BLOOD PRESSURE: 129 MMHG | DIASTOLIC BLOOD PRESSURE: 69 MMHG | TEMPERATURE: 98.3 F | HEART RATE: 91 BPM

## 2017-07-20 VITALS
RESPIRATION RATE: 17 BRPM | SYSTOLIC BLOOD PRESSURE: 131 MMHG | HEART RATE: 100 BPM | OXYGEN SATURATION: 100 % | TEMPERATURE: 98.1 F | DIASTOLIC BLOOD PRESSURE: 60 MMHG

## 2017-07-20 VITALS
HEART RATE: 59 BPM | SYSTOLIC BLOOD PRESSURE: 116 MMHG | RESPIRATION RATE: 18 BRPM | OXYGEN SATURATION: 94 % | TEMPERATURE: 98.6 F | DIASTOLIC BLOOD PRESSURE: 59 MMHG

## 2017-07-20 VITALS — BODY MASS INDEX: 27.93 KG/M2 | WEIGHT: 163.58 LBS | HEIGHT: 64 IN

## 2017-07-20 VITALS
RESPIRATION RATE: 18 BRPM | DIASTOLIC BLOOD PRESSURE: 67 MMHG | HEART RATE: 95 BPM | SYSTOLIC BLOOD PRESSURE: 121 MMHG | OXYGEN SATURATION: 98 % | TEMPERATURE: 97.9 F

## 2017-07-20 DIAGNOSIS — Z79.899: ICD-10-CM

## 2017-07-20 DIAGNOSIS — Z87.891: ICD-10-CM

## 2017-07-20 DIAGNOSIS — I10: ICD-10-CM

## 2017-07-20 DIAGNOSIS — D64.9: Primary | ICD-10-CM

## 2017-07-20 DIAGNOSIS — K21.9: ICD-10-CM

## 2017-07-20 DIAGNOSIS — E78.00: ICD-10-CM

## 2017-07-20 DIAGNOSIS — F41.9: ICD-10-CM

## 2017-07-20 DIAGNOSIS — F32.9: ICD-10-CM

## 2017-07-20 DIAGNOSIS — M19.90: ICD-10-CM

## 2017-07-20 LAB
ALP SERPL-CCNC: 67 U/L (ref 45–117)
ALT SERPL-CCNC: 17 U/L (ref 10–53)
ANION GAP SERPL CALC-SCNC: 7 MEQ/L (ref 5–15)
APTT BLD: 22.4 SEC (ref 24.3–30.1)
AST SERPL-CCNC: 15 U/L (ref 15–37)
BASOPHILS # BLD AUTO: 0 TH/MM3 (ref 0–0.2)
BASOPHILS NFR BLD: 0.4 % (ref 0–2)
BILIRUB SERPL-MCNC: 0.3 MG/DL (ref 0.2–1)
BUN SERPL-MCNC: 19 MG/DL (ref 7–18)
CHLORIDE SERPL-SCNC: 106 MEQ/L (ref 98–107)
DACRYOCYTES BLD QL SMEAR: (no result)
EOSINOPHIL # BLD: 0.1 TH/MM3 (ref 0–0.4)
EOSINOPHIL NFR BLD: 1.6 % (ref 0–4)
ERYTHROCYTE [DISTWIDTH] IN BLOOD BY AUTOMATED COUNT: 15.9 % (ref 11.6–17.2)
GFR SERPLBLD BASED ON 1.73 SQ M-ARVRAT: 59 ML/MIN (ref 89–?)
HCO3 BLD-SCNC: 26.8 MEQ/L (ref 21–32)
HCT VFR BLD CALC: 18.5 % (ref 35–46)
HEMO FLAGS: (no result)
INR PPP: 0.9 RATIO
LYMPHOCYTES # BLD AUTO: 0.7 TH/MM3 (ref 1–4.8)
LYMPHOCYTES NFR BLD AUTO: 13.1 % (ref 9–44)
MCH RBC QN AUTO: 24.3 PG (ref 27–34)
MCHC RBC AUTO-ENTMCNC: 31.2 % (ref 32–36)
MCV RBC AUTO: 77.8 FL (ref 80–100)
MONOCYTES NFR BLD: 6.9 % (ref 0–8)
NEUTROPHILS # BLD AUTO: 4.4 TH/MM3 (ref 1.8–7.7)
NEUTROPHILS NFR BLD AUTO: 78 % (ref 16–70)
PLATELET # BLD: 347 TH/MM3 (ref 150–450)
POTASSIUM SERPL-SCNC: 3.9 MEQ/L (ref 3.5–5.1)
PROTHROMBIN TIME: 10 SEC (ref 9.8–11.6)
RBC # BLD AUTO: 2.37 MIL/MM3 (ref 4–5.3)
SCAN/DIFF: (no result)
SCHISTOCYTES BLD QL SMEAR: (no result)
SODIUM SERPL-SCNC: 140 MEQ/L (ref 136–145)
WBC # BLD AUTO: 5.6 TH/MM3 (ref 4–11)

## 2017-07-20 PROCEDURE — 86900 BLOOD TYPING SEROLOGIC ABO: CPT

## 2017-07-20 PROCEDURE — 86920 COMPATIBILITY TEST SPIN: CPT

## 2017-07-20 PROCEDURE — 97161 PT EVAL LOW COMPLEX 20 MIN: CPT

## 2017-07-20 PROCEDURE — 85730 THROMBOPLASTIN TIME PARTIAL: CPT

## 2017-07-20 PROCEDURE — 85025 COMPLETE CBC W/AUTO DIFF WBC: CPT

## 2017-07-20 PROCEDURE — G0378 HOSPITAL OBSERVATION PER HR: HCPCS

## 2017-07-20 PROCEDURE — 80048 BASIC METABOLIC PNL TOTAL CA: CPT

## 2017-07-20 PROCEDURE — 85610 PROTHROMBIN TIME: CPT

## 2017-07-20 PROCEDURE — 36430 TRANSFUSION BLD/BLD COMPNT: CPT

## 2017-07-20 PROCEDURE — 86901 BLOOD TYPING SEROLOGIC RH(D): CPT

## 2017-07-20 PROCEDURE — 71020: CPT

## 2017-07-20 PROCEDURE — 86850 RBC ANTIBODY SCREEN: CPT

## 2017-07-20 PROCEDURE — P9016 RBC LEUKOCYTES REDUCED: HCPCS

## 2017-07-20 PROCEDURE — 80053 COMPREHEN METABOLIC PANEL: CPT

## 2017-07-20 PROCEDURE — 99285 EMERGENCY DEPT VISIT HI MDM: CPT

## 2017-07-20 PROCEDURE — 76937 US GUIDE VASCULAR ACCESS: CPT

## 2017-07-20 RX ADMIN — ATORVASTATIN CALCIUM SCH MG: 10 TABLET, FILM COATED ORAL at 21:29

## 2017-07-20 RX ADMIN — Medication SCH ML: at 21:29

## 2017-07-20 RX ADMIN — STANDARDIZED SENNA CONCENTRATE AND DOCUSATE SODIUM SCH TAB: 8.6; 5 TABLET, FILM COATED ORAL at 21:29

## 2017-07-20 NOTE — PD
HPI


Chief Complaint:  Abnormal Results


Time Seen by Provider:  09:45


Travel History


International Travel<30 days:  No


Contact w/Intl Traveler<30days:  No


Traveled to known affect area:  No





History of Present Illness


HPI


84-year-old female patient with previous history of anemia of uncertain 

etiology with history of transfusion previously, has recently been approved for 

a GI Study to evaluate for possible causes, presents to the ER today because 

she has been sent in by her primary care physician due to a follow-up lab work 

showing hemoglobin of 6.  Patient admits she has been having some dyspnea on 

exertion recently.  She denies any current chest pains, dizziness, or other 

symptoms.  She denies any black stools or blood in the stools.





Modifying Factors: None


Associated Signs & Symptoms: Anemia, shortness of breath


Risk Factors: History of previous anemia





PFSH


Past Medical History


Anemia:  Yes


Arthritis:  Yes


Autoimmune Disease:  No


Anxiety:  Yes


Depression:  Yes


Heart Rhythm Problems:  No


Cancer:  No


Cardiovascular Problems:  Yes


High Cholesterol:  Yes


Congestive Heart Failure:  No


Developmental Delay:  Yes


Diabetes:  No


Diminished Hearing:  No


Endocrine:  No


GERD:  Yes


Glaucoma:  No


Genitourinary:  No


Hepatitis:  No


Hiatal Hernia:  No


Hypertension:  Yes


Immune Disorder:  No


Implanted Vascular Access Dvce:  Yes


Medical other:  Yes (arthritis)


Musculoskeletal:  Yes


Neurologic:  No


Psychiatric:  Yes


Reproductive:  No


Respiratory:  No


Immunizations Current:  Yes


Thyroid Disease:  No


Influenza Vaccination:  Yes


Pregnant?:  Not Pregnant


Menopausal:  Yes





Past Surgical History


Abdominal Surgery:  Yes (HYSTERECTOMY)


Body Medical Devices:  FRANKIE EYE LENS


Cardiac Surgery:  No


Ear Surgery:  No


Endocrine Surgery:  No


Eye Surgery:  Yes (BILATERAL CATARACT SX; LEFT RETINA SX x2)


Genitourinary Surgery:  No


Gynecologic Surgery:  Yes (HYSTERECTOMY)


Hysterectomy:  Yes


Joint Replacement:  Yes (Bilateral Knee)


Oral Surgery:  No


Thoracic Surgery:  No


Other Surgery:  Yes (LEFT TOTAL KNEE REPLACEMENT)





Social History


Alcohol Use:  Yes (occas)


Tobacco Use:  No (QUIT 30 YRS)


Substance Use:  No





Allergies-Medications


(Allergen,Severity, Reaction):  


Coded Allergies:  


     Morphine (Verified  Allergy, Intermediate, 7/20/17)


Reported Meds & Prescriptions





Reported Meds & Active Scripts


Active


Poly-Iron 150 (Polysaccharide Iron Complex) 150 Mg Cap 150 Mg PO ONCE


Reported


Triamterene-Hydrochlorothiazide 50-25 Mg Cap 1 Cap PO DAILY


Omeprazole 20 Mg Tab 20 Mg PO DAILY


Sertraline (Sertraline HCl) 25 Mg Tab 25 Mg PO DAILY


Ocuvite (Multiple Vitamins W/ Minerals) 1 Tab 1 Tab PO DAILY


Cetirizine (Cetirizine HCl) 10 Mg Tab 10 Mg PO DAILY


Xanax (Alprazolam) 0.25 Mg Tab 0.25 Mg PO AS DIRECTED PRN


Lipitor (Atorvastatin Calcium) 10 Mg Tab 10 Mg PO HS








Review of Systems


Except as stated in HPI:  all other systems reviewed are Neg





Physical Exam


Narrative


GENERAL: Well-developed elderly white female patient currently in mild 

distress.  Awake and oriented 3.


SKIN: Focused skin assessment warm/dry.


HEAD: Atraumatic. Normocephalic. 


EYES: Pupils equal and round. No scleral icterus. No injection or drainage. 


ENT: No nasal bleeding or discharge.  Mucous membranes pink and moist.


NECK: Trachea midline. No JVD. 


CARDIOVASCULAR: Regular rate and rhythm.  No murmur appreciated.


RESPIRATORY: No accessory muscle use. Clear to auscultation. Breath sounds 

equal bilaterally. 


GASTROINTESTINAL: Abdomen soft, non-tender, nondistended. Hepatic and splenic 

margins not palpable. 


MUSCULOSKELETAL: No obvious deformities. No clubbing.  No cyanosis.  No edema. 


NEUROLOGICAL: Awake and alert. No obvious cranial nerve deficits.  Motor 

grossly within normal limits. Normal speech.


PSYCHIATRIC: Appropriate mood and affect; insight and judgment normal.





Data


Data


Last Documented VS





Vital Signs








  Date Time  Temp Pulse Resp B/P Pulse Ox O2 Delivery O2 Flow Rate FiO2


 


7/20/17 09:27 98.1 100 17 131/60 100   








Orders





 Complete Blood Count With Diff (7/20/17 09:36)


Comprehensive Metabolic Panel (7/20/17 09:36)


Prothrombin Time / Inr (Pt) (7/20/17 09:36)


Act Partial Throm Time (Ptt) (7/20/17 09:36)


Type And Screen (7/20/17 09:36)


Red Blood Cells (Rbc) (7/20/17 11:32)


Blood Product Administration .UPON TRANSFUSION (7/20/17 11:32)


Sodium Chlor 0.9% 250 Ml Inj (Ns 250 Ml (7/20/17 11:45)


Admit Order (Ed Use Only) (7/20/17 11:43)





Labs








 Laboratory Tests








Test 7/20/17 7/20/17 7/20/17





 10:10 10:30 11:15


 


Blood Type O NEGATIVE   


 


Antibody Screen NEGATIVE   


 


Prothrombin Time  10.0 SEC 


 


Prothromb Time International  0.9 RATIO 





Ratio   


 


Activated Partial  22.4 SEC 





Thromboplast Time   


 


White Blood Count   5.6 TH/MM3


 


Red Blood Count   2.37 MIL/MM3


 


Hemoglobin   5.8 GM/DL


 


Hematocrit   18.5 %


 


Mean Corpuscular Volume   77.8 FL


 


Mean Corpuscular Hemoglobin   24.3 PG


 


Mean Corpuscular Hemoglobin   31.2 %





Concent   


 


Red Cell Distribution Width   15.9 %


 


Platelet Count   347 TH/MM3


 


Mean Platelet Volume   7.9 FL


 


Neutrophils (%) (Auto)   78.0 %


 


Lymphocytes (%) (Auto)   13.1 %


 


Monocytes (%) (Auto)   6.9 %


 


Eosinophils (%) (Auto)   1.6 %


 


Basophils (%) (Auto)   0.4 %


 


Neutrophils # (Auto)   4.4 TH/MM3


 


Lymphocytes # (Auto)   0.7 TH/MM3


 


Monocytes # (Auto)   0.4 TH/MM3


 


Eosinophils # (Auto)   0.1 TH/MM3


 


Basophils # (Auto)   0.0 TH/MM3


 


CBC Comment   AUTO DIFF 


 


Differential Comment   AUTO DIFF





   CONFIRMED


 


Tear Drop Cells   1+ 


 


Keratocytes   OCC 


 


Sodium Level   140 MEQ/L


 


Potassium Level   3.9 MEQ/L


 


Chloride Level   106 MEQ/L


 


Carbon Dioxide Level   26.8 MEQ/L


 


Anion Gap   7 MEQ/L


 


Blood Urea Nitrogen   19 MG/DL


 


Creatinine   0.91 MG/DL


 


Estimat Glomerular Filtration   59 ML/MIN





Rate   


 


Random Glucose   97 MG/DL


 


Calcium Level   8.2 MG/DL


 


Total Bilirubin   0.3 MG/DL


 


Aspartate Amino Transf   15 U/L





(AST/SGOT)   


 


Alanine Aminotransferase   17 U/L





(ALT/SGPT)   


 


Alkaline Phosphatase   67 U/L


 


Total Protein   6.5 GM/DL


 


Albumin   3.2 GM/DL


 


Crossmatch   Leukocyte-Reduced





   Red Blood





   Cells


 


Blood Bank Comment    














MDM


Medical Decision Making


Medical Screen Exam Complete:  Yes


Emergency Medical Condition:  Yes


Medical Record Reviewed:  Yes


Interpretation(s)





Laboratory Tests








Test 7/20/17 7/20/17





 10:30 11:15


 


Activated Partial 22.4 SEC 





Thromboplast Time (24.3-30.1) 


 


Red Blood Count  2.37 MIL/MM3





  (4.00-5.30)


 


Hemoglobin  5.8 GM/DL





  (11.6-15.3)


 


Hematocrit  18.5 %





  (35.0-46.0)


 


Mean Corpuscular Volume  77.8 FL





  (80.0-100.0)


 


Mean Corpuscular Hemoglobin  24.3 PG





  (27.0-34.0)


 


Mean Corpuscular Hemoglobin  31.2 %





Concent  (32.0-36.0)


 


Neutrophils (%) (Auto)  78.0 %





  (16.0-70.0)


 


Lymphocytes # (Auto)  0.7 TH/MM3





  (1.0-4.8)


 


Tear Drop Cells  1+  (NORMAL)


 


Keratocytes  OCC  (NORMAL)


 


Blood Urea Nitrogen  19 MG/DL (7-18)


 


Estimat Glomerular Filtration  59 ML/MIN (>89)





Rate  


 


Calcium Level  8.2 MG/DL





  (8.5-10.1)


 


Albumin  3.2 GM/DL





  (3.4-5.0)








Differential Diagnosis


Anemiarule out GI bleeding


Narrative Course


Patient's fecal called was negative.  She is severely anemic with hemoglobin of 

5.  Transfusion order was made for her.  At this point, case was discussed with 

Dr. Cortez for admission.





Diagnosis





 Primary Impression:  


 Anemia





Admitting Information


Admitting Physician Requests:  Admit








Frank Anguiano MD Jul 20, 2017 09:47

## 2017-07-20 NOTE — HHI.HP
HPI


Service


[facility]


Primary Care Physician


Flower Gamez MD


Admission Diagnosis


severe anemia


Chief Complaint:  


sent to hospital from PCP for blood transfusion


Travel History


International Travel<30 Days:  No


Contact w/Intl Traveler <30 Da:  No


Traveled to Known Affected Are:  No


History of Present Illness


84-year-old female patient with previous history of anemia of uncertain 

etiology with history of transfusion previously, has recently been approved for 

a GI Study to evaluate for possible causes, presents to the ER today because 

she has been sent in by her primary care physician due to a follow-up lab work 

showing hemoglobin of 6.  Patient admits she has been having some dyspnea on 

exertion recently.  She denies any current chest pains, dizziness, or other 

symptoms.  She denies any black stools or blood in the stools.  Patient had 

endoscopy and colon test with some twist in colon and only was able to view to 

sigmoid and is scheduled for capsle endoscopy to look at small bowel.  Has had 

lightheadedness and has low hemoglobin will transfuse recheck cbc am.





Review of Systems


Constitutional:  COMPLAINS OF: Fatigue





Past Family Social History


Past Medical History


djd ,anemia,hypertension,gerd,hyperlipidemia


Past Surgical History


hysterectomy,cataract knees


Reported Medications


xanax .25 prn,atrovastatin 20,[id number] protonix 40,sertraline 50


Allergies:  


Coded Allergies:  


     Morphine (Verified  Allergy, Intermediate, 7/20/17)


Social History


former smoker,no etoh





Physical Exam


Vital Signs





 Vital Signs








  Date Time  Temp Pulse Resp B/P Pulse Ox O2 Delivery O2 Flow Rate FiO2


 


7/20/17 11:47  94 18 138/73 99 Room Air  


 


7/20/17 09:27 98.1 100 17 131/60 100   








Physical Exam


GENERAL: This is a well-nourished, well-developed patient, in no apparent 

distress.


SKIN: No rashes, ecchymoses or lesions. Cool and dry.


HEAD: Atraumatic. Normocephalic. No temporal or scalp tenderness.


EYES: Pupils equal round and reactive. Extraocular motions intact. No scleral 

icterus. No injection or drainage. 


ENT: Nose without bleeding, purulent drainage or septal hematoma. Throat 

without erythema, tonsillar hypertrophy or exudate. Uvula midline. Airway 

patent.


NECK: Trachea midline. No JVD or lymphadenopathy. Supple, nontender, no 

meningeal signs.


CARDIOVASCULAR: Regular rate and rhythm without murmurs, gallops, or rubs. 


RESPIRATORY: Clear to auscultation. Breath sounds equal bilaterally. No wheezes

, rales, or rhonchi.  


GASTROINTESTINAL: Abdomen soft, non-tender, nondistended. No hepato-splenomegaly

, or palpable masses. No guarding.


MUSCULOSKELETAL: Extremities without clubbing, cyanosis, or edema. No joint 

tenderness, effusion, or edema noted. No calf tenderness. Negative Homans sign 

bilaterally.


NEUROLOGICAL: Awake and alert. Cranial nerves II through XII intact.  Motor and 

sensory grossly within normal limits. Five out of 5 muscle strength in all 

muscle groups.  Normal speech.


Laboratory





Laboratory Tests








Test 7/20/17 7/20/17 7/20/17





 10:10 10:30 11:15


 


Blood Type O NEGATIVE   


 


Antibody Screen NEGATIVE   


 


Prothrombin Time  10.0  


 


Prothromb Time International  0.9  





Ratio   


 


Activated Partial  22.4  





Thromboplast Time   


 


White Blood Count   5.6 


 


Red Blood Count   2.37 


 


Hemoglobin   5.8 


 


Hematocrit   18.5 


 


Mean Corpuscular Volume   77.8 


 


Mean Corpuscular Hemoglobin   24.3 


 


Mean Corpuscular Hemoglobin   31.2 





Concent   


 


Red Cell Distribution Width   15.9 


 


Platelet Count   347 


 


Mean Platelet Volume   7.9 


 


Neutrophils (%) (Auto)   78.0 


 


Lymphocytes (%) (Auto)   13.1 


 


Monocytes (%) (Auto)   6.9 


 


Eosinophils (%) (Auto)   1.6 


 


Basophils (%) (Auto)   0.4 


 


Neutrophils # (Auto)   4.4 


 


Lymphocytes # (Auto)   0.7 


 


Monocytes # (Auto)   0.4 


 


Eosinophils # (Auto)   0.1 


 


Basophils # (Auto)   0.0 


 


CBC Comment   AUTO DIFF 


 


Differential Comment   AUTO DIFF





   CONFIRMED


 


Tear Drop Cells   1+ 


 


Keratocytes   OCC 


 


Sodium Level   140 


 


Potassium Level   3.9 


 


Chloride Level   106 


 


Carbon Dioxide Level   26.8 


 


Anion Gap   7 


 


Blood Urea Nitrogen   19 


 


Creatinine   0.91 


 


Estimat Glomerular Filtration   59 





Rate   


 


Random Glucose   97 


 


Calcium Level   8.2 


 


Total Bilirubin   0.3 


 


Aspartate Amino Transf   15 





(AST/SGOT)   


 


Alanine Aminotransferase   17 





(ALT/SGPT)   


 


Alkaline Phosphatase   67 


 


Total Protein   6.5 


 


Albumin   3.2 


 


Crossmatch   Leukocyte-Reduced





   Red Blood





   Cells


 


Blood Bank Comment    








Result Diagram:  


7/20/17 1115                                                                   

             7/20/17 1115





Course


in er arranging for blood transfusion





Assessment and Plan


Problem List:  


(1) Symptomatic anemia


Status:  Chronic


Plan:  hgb down to 5.8 will transfuse and follow up hgb does have outpatient 

procedure scheduled also note has negative CEA once hgb better discharge





Assessment and Plan


as above


Code Status


full


Discussed Condition With


patient








Eric Mackay MD Jul 20, 2017 12:51

## 2017-07-21 VITALS
HEART RATE: 87 BPM | DIASTOLIC BLOOD PRESSURE: 71 MMHG | OXYGEN SATURATION: 97 % | TEMPERATURE: 97.2 F | SYSTOLIC BLOOD PRESSURE: 138 MMHG | RESPIRATION RATE: 20 BRPM

## 2017-07-21 VITALS
OXYGEN SATURATION: 97 % | HEART RATE: 91 BPM | SYSTOLIC BLOOD PRESSURE: 128 MMHG | TEMPERATURE: 97.5 F | RESPIRATION RATE: 18 BRPM | DIASTOLIC BLOOD PRESSURE: 66 MMHG

## 2017-07-21 VITALS
TEMPERATURE: 98.3 F | RESPIRATION RATE: 20 BRPM | SYSTOLIC BLOOD PRESSURE: 124 MMHG | DIASTOLIC BLOOD PRESSURE: 72 MMHG | HEART RATE: 91 BPM | OXYGEN SATURATION: 97 %

## 2017-07-21 VITALS
RESPIRATION RATE: 18 BRPM | HEART RATE: 89 BPM | SYSTOLIC BLOOD PRESSURE: 118 MMHG | TEMPERATURE: 98 F | OXYGEN SATURATION: 97 % | DIASTOLIC BLOOD PRESSURE: 63 MMHG

## 2017-07-21 VITALS
DIASTOLIC BLOOD PRESSURE: 69 MMHG | OXYGEN SATURATION: 95 % | SYSTOLIC BLOOD PRESSURE: 109 MMHG | HEART RATE: 86 BPM | TEMPERATURE: 98.4 F | RESPIRATION RATE: 18 BRPM

## 2017-07-21 VITALS
DIASTOLIC BLOOD PRESSURE: 79 MMHG | RESPIRATION RATE: 18 BRPM | OXYGEN SATURATION: 93 % | HEART RATE: 85 BPM | SYSTOLIC BLOOD PRESSURE: 134 MMHG | TEMPERATURE: 97.8 F

## 2017-07-21 VITALS
RESPIRATION RATE: 20 BRPM | TEMPERATURE: 98.1 F | OXYGEN SATURATION: 94 % | SYSTOLIC BLOOD PRESSURE: 131 MMHG | DIASTOLIC BLOOD PRESSURE: 66 MMHG | HEART RATE: 86 BPM

## 2017-07-21 VITALS
DIASTOLIC BLOOD PRESSURE: 75 MMHG | OXYGEN SATURATION: 97 % | SYSTOLIC BLOOD PRESSURE: 141 MMHG | TEMPERATURE: 97.2 F | HEART RATE: 18 BPM | RESPIRATION RATE: 18 BRPM

## 2017-07-21 VITALS
HEART RATE: 92 BPM | SYSTOLIC BLOOD PRESSURE: 166 MMHG | OXYGEN SATURATION: 93 % | DIASTOLIC BLOOD PRESSURE: 79 MMHG | RESPIRATION RATE: 18 BRPM | TEMPERATURE: 98 F

## 2017-07-21 VITALS
HEART RATE: 86 BPM | SYSTOLIC BLOOD PRESSURE: 133 MMHG | DIASTOLIC BLOOD PRESSURE: 67 MMHG | TEMPERATURE: 97.5 F | RESPIRATION RATE: 18 BRPM | OXYGEN SATURATION: 96 %

## 2017-07-21 VITALS
TEMPERATURE: 97.9 F | OXYGEN SATURATION: 92 % | HEART RATE: 94 BPM | DIASTOLIC BLOOD PRESSURE: 71 MMHG | SYSTOLIC BLOOD PRESSURE: 136 MMHG | RESPIRATION RATE: 17 BRPM

## 2017-07-21 VITALS
RESPIRATION RATE: 18 BRPM | DIASTOLIC BLOOD PRESSURE: 72 MMHG | HEART RATE: 105 BPM | TEMPERATURE: 97.5 F | OXYGEN SATURATION: 98 % | SYSTOLIC BLOOD PRESSURE: 135 MMHG

## 2017-07-21 LAB
BASOPHILS # BLD AUTO: 0 TH/MM3 (ref 0–0.2)
BASOPHILS # BLD AUTO: 0.3 TH/MM3 (ref 0–0.2)
BASOPHILS NFR BLD: 0.5 % (ref 0–2)
BASOPHILS NFR BLD: 4.3 % (ref 0–2)
EOSINOPHIL # BLD: 0.2 TH/MM3 (ref 0–0.4)
EOSINOPHIL # BLD: 0.2 TH/MM3 (ref 0–0.4)
EOSINOPHIL NFR BLD: 2 % (ref 0–4)
EOSINOPHIL NFR BLD: 2.7 % (ref 0–4)
ERYTHROCYTE [DISTWIDTH] IN BLOOD BY AUTOMATED COUNT: 15.3 % (ref 11.6–17.2)
ERYTHROCYTE [DISTWIDTH] IN BLOOD BY AUTOMATED COUNT: 15.6 % (ref 11.6–17.2)
HCT VFR BLD CALC: 25.6 % (ref 35–46)
HCT VFR BLD CALC: 31.3 % (ref 35–46)
HEMO FLAGS: (no result)
HEMO FLAGS: (no result)
LYMPHOCYTES # BLD AUTO: 1.5 TH/MM3 (ref 1–4.8)
LYMPHOCYTES # BLD AUTO: 1.6 TH/MM3 (ref 1–4.8)
LYMPHOCYTES NFR BLD AUTO: 20.3 % (ref 9–44)
LYMPHOCYTES NFR BLD AUTO: 24.2 % (ref 9–44)
MCH RBC QN AUTO: 25.7 PG (ref 27–34)
MCH RBC QN AUTO: 26.1 PG (ref 27–34)
MCHC RBC AUTO-ENTMCNC: 31.8 % (ref 32–36)
MCHC RBC AUTO-ENTMCNC: 32.9 % (ref 32–36)
MCV RBC AUTO: 79.3 FL (ref 80–100)
MCV RBC AUTO: 80.9 FL (ref 80–100)
MONOCYTES NFR BLD: 10 % (ref 0–8)
MONOCYTES NFR BLD: 9.7 % (ref 0–8)
NEUTROPHILS # BLD AUTO: 4.3 TH/MM3 (ref 1.8–7.7)
NEUTROPHILS # BLD AUTO: 4.8 TH/MM3 (ref 1.8–7.7)
NEUTROPHILS NFR BLD AUTO: 62.9 % (ref 16–70)
NEUTROPHILS NFR BLD AUTO: 63.4 % (ref 16–70)
PLATELET # BLD: 287 TH/MM3 (ref 150–450)
PLATELET # BLD: 334 TH/MM3 (ref 150–450)
RBC # BLD AUTO: 3.24 MIL/MM3 (ref 4–5.3)
RBC # BLD AUTO: 3.87 MIL/MM3 (ref 4–5.3)
WBC # BLD AUTO: 6.8 TH/MM3 (ref 4–11)
WBC # BLD AUTO: 7.6 TH/MM3 (ref 4–11)

## 2017-07-21 RX ADMIN — SERTRALINE HYDROCHLORIDE SCH MG: 50 TABLET, FILM COATED ORAL at 08:42

## 2017-07-21 RX ADMIN — ATORVASTATIN CALCIUM SCH MG: 10 TABLET, FILM COATED ORAL at 21:12

## 2017-07-21 RX ADMIN — Medication SCH ML: at 21:12

## 2017-07-21 RX ADMIN — PANTOPRAZOLE SODIUM SCH MG: 20 TABLET, DELAYED RELEASE ORAL at 08:42

## 2017-07-21 RX ADMIN — Medication SCH TAB: at 08:42

## 2017-07-21 RX ADMIN — TEMAZEPAM PRN MG: 15 CAPSULE ORAL at 21:12

## 2017-07-21 RX ADMIN — STANDARDIZED SENNA CONCENTRATE AND DOCUSATE SODIUM SCH TAB: 8.6; 5 TABLET, FILM COATED ORAL at 08:42

## 2017-07-21 RX ADMIN — Medication SCH ML: at 08:43

## 2017-07-21 RX ADMIN — ACETAMINOPHEN PRN MG: 325 TABLET ORAL at 14:56

## 2017-07-21 RX ADMIN — STANDARDIZED SENNA CONCENTRATE AND DOCUSATE SODIUM SCH TAB: 8.6; 5 TABLET, FILM COATED ORAL at 21:00

## 2017-07-21 NOTE — HHI.PR
Subjective


Remarks


Patient has slight headache hgb improved to 8.4 will give additional unit blood 

and recheck hgb.





Objective


Vitals


GENERAL: 


SKIN: Warm and dry.


HEAD: Atraumatic. Normocephalic. 


EYES: Pupils equal and round. No scleral icterus. No injection or drainage. 


ENT: No nasal bleeding or discharge.  Mucous membranes pink and moist.


NECK: Trachea midline. No JVD. 


CARDIOVASCULAR: Regular rate and rhythm.  


RESPIRATORY: No accessory muscle use. Clear to auscultation. Breath sounds 

equal bilaterally. 


GASTROINTESTINAL: Abdomen soft, non-tender, nondistended. Hepatic and splenic 

margins not palpable. 


MUSCULOSKELETAL: Extremities without clubbing, cyanosis, or edema. No obvious 

deformities. 


NEUROLOGICAL: Awake and alert. No obvious cranial nerve deficits.  Motor 

grossly within normal limits. Five out of 5 muscle strength in the arms and 

legs.  Normal speech.


PSYCHIATRIC: Appropriate mood and affect; insight and judgment normal.





 Vital Signs








  Date Time  Temp Pulse Resp B/P Pulse Ox O2 Delivery O2 Flow Rate FiO2


 


7/21/17 08:00 97.9 94 17 136/71 92   


 


7/21/17 00:18 98.1 86 20 131/66 94   


 


7/21/17 00:04 98.4 86 18 109/69 95   


 


7/21/17 00:00 97.5 86 18 133/67 96   


 


7/20/17 20:00 97.4 100 18 129/70 96   


 


7/20/17 19:27 98.6 59 18 116/59 94   


 


7/20/17 16:00 98.3 91 20 129/69 100   


 


7/20/17 13:00 97.9 95 18 121/67 98   


 


7/20/17 11:47  94 18 138/73 99 Room Air  














 7/20/17 7/20/17 7/21/17





 14:59 22:59 06:59


 


Intake Total  425 ml 


 


Balance  425 ml 


 


   


 


Intake Oral  425 ml 


 


# Voids  1 








Result Diagram:  


7/21/17 0620                                                                   

             7/20/17 1115








A/P


Problem List:  


(1) Symptomatic anemia


Status:  Chronic


Plan:  hgb down to 5.8 will transfuse and follow up hgb does have outpatient 

procedure scheduled also note has negative CEA ,hgb up to 8 will give one more 

unit blood add tylenol for headache











Eric Mackay MD Jul 21, 2017 11:44

## 2017-07-22 VITALS
SYSTOLIC BLOOD PRESSURE: 144 MMHG | OXYGEN SATURATION: 96 % | RESPIRATION RATE: 18 BRPM | TEMPERATURE: 98.1 F | DIASTOLIC BLOOD PRESSURE: 86 MMHG | HEART RATE: 85 BPM

## 2017-07-22 VITALS
DIASTOLIC BLOOD PRESSURE: 72 MMHG | RESPIRATION RATE: 18 BRPM | SYSTOLIC BLOOD PRESSURE: 165 MMHG | HEART RATE: 96 BPM | TEMPERATURE: 97 F | OXYGEN SATURATION: 98 %

## 2017-07-22 VITALS
RESPIRATION RATE: 18 BRPM | OXYGEN SATURATION: 96 % | TEMPERATURE: 97.9 F | DIASTOLIC BLOOD PRESSURE: 78 MMHG | HEART RATE: 86 BPM | SYSTOLIC BLOOD PRESSURE: 129 MMHG

## 2017-07-22 VITALS
RESPIRATION RATE: 20 BRPM | TEMPERATURE: 96.9 F | SYSTOLIC BLOOD PRESSURE: 114 MMHG | HEART RATE: 89 BPM | DIASTOLIC BLOOD PRESSURE: 81 MMHG | OXYGEN SATURATION: 98 %

## 2017-07-22 VITALS
DIASTOLIC BLOOD PRESSURE: 73 MMHG | TEMPERATURE: 97.1 F | RESPIRATION RATE: 18 BRPM | SYSTOLIC BLOOD PRESSURE: 133 MMHG | OXYGEN SATURATION: 95 % | HEART RATE: 86 BPM

## 2017-07-22 LAB
ANION GAP SERPL CALC-SCNC: 8 MEQ/L (ref 5–15)
BASOPHILS # BLD AUTO: 0 TH/MM3 (ref 0–0.2)
BASOPHILS NFR BLD: 0.8 % (ref 0–2)
BUN SERPL-MCNC: 19 MG/DL (ref 7–18)
CHLORIDE SERPL-SCNC: 102 MEQ/L (ref 98–107)
EOSINOPHIL # BLD: 0.2 TH/MM3 (ref 0–0.4)
EOSINOPHIL NFR BLD: 3.9 % (ref 0–4)
ERYTHROCYTE [DISTWIDTH] IN BLOOD BY AUTOMATED COUNT: 15.8 % (ref 11.6–17.2)
GFR SERPLBLD BASED ON 1.73 SQ M-ARVRAT: 65 ML/MIN (ref 89–?)
HCO3 BLD-SCNC: 27.4 MEQ/L (ref 21–32)
HCT VFR BLD CALC: 34.7 % (ref 35–46)
HEMO FLAGS: (no result)
LYMPHOCYTES # BLD AUTO: 1.6 TH/MM3 (ref 1–4.8)
LYMPHOCYTES NFR BLD AUTO: 26 % (ref 9–44)
MCH RBC QN AUTO: 25.8 PG (ref 27–34)
MCHC RBC AUTO-ENTMCNC: 32.1 % (ref 32–36)
MCV RBC AUTO: 80.3 FL (ref 80–100)
MONOCYTES NFR BLD: 9.7 % (ref 0–8)
NEUTROPHILS # BLD AUTO: 3.7 TH/MM3 (ref 1.8–7.7)
NEUTROPHILS NFR BLD AUTO: 59.6 % (ref 16–70)
PLATELET # BLD: 310 TH/MM3 (ref 150–450)
POTASSIUM SERPL-SCNC: 3.5 MEQ/L (ref 3.5–5.1)
RBC # BLD AUTO: 4.32 MIL/MM3 (ref 4–5.3)
SODIUM SERPL-SCNC: 137 MEQ/L (ref 136–145)
WBC # BLD AUTO: 6.1 TH/MM3 (ref 4–11)

## 2017-07-22 RX ADMIN — Medication SCH ML: at 09:26

## 2017-07-22 RX ADMIN — FLUTICASONE PROPIONATE SCH SPRAY: 50 SPRAY, METERED NASAL at 13:30

## 2017-07-22 RX ADMIN — ACETAMINOPHEN PRN MG: 325 TABLET ORAL at 21:10

## 2017-07-22 RX ADMIN — TEMAZEPAM PRN MG: 15 CAPSULE ORAL at 21:09

## 2017-07-22 RX ADMIN — STANDARDIZED SENNA CONCENTRATE AND DOCUSATE SODIUM SCH TAB: 8.6; 5 TABLET, FILM COATED ORAL at 09:25

## 2017-07-22 RX ADMIN — PANTOPRAZOLE SODIUM SCH MG: 20 TABLET, DELAYED RELEASE ORAL at 09:25

## 2017-07-22 RX ADMIN — ACETAMINOPHEN PRN MG: 325 TABLET ORAL at 09:31

## 2017-07-22 RX ADMIN — STANDARDIZED SENNA CONCENTRATE AND DOCUSATE SODIUM SCH TAB: 8.6; 5 TABLET, FILM COATED ORAL at 21:00

## 2017-07-22 RX ADMIN — SERTRALINE HYDROCHLORIDE SCH MG: 50 TABLET, FILM COATED ORAL at 09:26

## 2017-07-22 RX ADMIN — Medication SCH ML: at 21:10

## 2017-07-22 RX ADMIN — ATORVASTATIN CALCIUM SCH MG: 10 TABLET, FILM COATED ORAL at 21:09

## 2017-07-22 RX ADMIN — Medication SCH TAB: at 09:26

## 2017-07-22 NOTE — RADRPT
EXAM DATE/TIME:  07/22/2017 16:13 

 

[facility] COMPARISON:     

CHEST PA & LAT, September 20, 2016, 15:09.

 

                     

INDICATIONS :     

Cough for two months.

                     

 

MEDICAL HISTORY :     

None.          

 

SURGICAL HISTORY :     

None.   

 

ENCOUNTER:     

Initial                                        

 

ACUITY:     

2 months      

 

PAIN SCORE:     

0/10

 

LOCATION:     

Bilateral chest 

 

FINDINGS:     

PA and lateral views of the chest demonstrate the lungs to be symmetrically aerated without evidence 
of mass, infiltrate or effusion.  The cardiomediastinal contours are unremarkable.  Osseous structure
s are intact.

 

CONCLUSION:     No acute disease.  

 

 

 

 Fritz Doyle MD on July 22, 2017 at 16:29           

Board Certified Radiologist.

 This report was verified electronically.

## 2017-07-22 NOTE — HHI.PR
Subjective


Remarks


headache, some cough , has not had her allergy medications, decreased appetite


ambulating to bathroom


on recheck states she has nausea and that her stools are dark


feeling weak,not sure she is strong enough to go home





Objective


Vitals





 Vital Signs








  Date Time  Temp Pulse Resp B/P Pulse Ox O2 Delivery O2 Flow Rate FiO2


 


7/22/17 12:00 97.9 86 18 129/78 96   


 


7/22/17 10:31   20     


 


7/22/17 08:00 97.0 96 18 165/72 98   


 


7/22/17 00:00 96.9 89 20 114/81 98   


 


7/21/17 20:00 97.2 87 20 138/71 97   


 


7/21/17 18:55 97.5 105 18 135/72 98   


 


7/21/17 16:05 97.2 18 18 141/75 97   


 


7/21/17 16:00 97.8 85 18 134/79 93   


 


7/21/17 15:35 98.3 91 20 124/72 97   


 


7/21/17 15:20 98.0 89 18 118/63 97   


 


7/21/17 15:05 97.5 91 18 128/66 97   














 7/21/17 7/21/17 7/22/17





 15:00 23:00 07:00


 


Intake Total 520 ml 700 ml 60 ml


 


Balance 520 ml 700 ml 60 ml


 


   


 


Intake Oral 520 ml 700 ml 60 ml


 


# Voids  5 4


 


# Bowel Movements  1 0








Result Diagram:  


7/21/17 2020 7/20/17 1115





Objective Remarks


lying in bed NAD


lungs cta


heart rrr jenni 2/6 through precordium


+bs no rebound





A/P


Problem List:  


(1) Symptomatic anemia


Status:  Chronic


Plan:  hgb imprved last pm after 3 units of blood 


if hgb repeat at 12 hrs is stable should be able to discharge home with f/u 

with gi for schedule pill cam. has had egds/colonoscopies w/o source of bleed


check potassium as she had furosemide


on recheck h/h stable but now complaining og nausea,dark stools


will have PT see her recheck h/h, hemocult


 





(2) Hypertension


Status:  Chronic


Plan:  blood pressure has been good off medications


will continue to hold and f/u in office on monday





(3) Anxiety


Status:  Chronic


Plan:  on alprazolam but has not been on home dose of sertraline will resume





Discharge Planning


plan for d/c later today or am tommorrFlower Shahid MD Jul 22, 2017 13:20

## 2017-07-23 VITALS
SYSTOLIC BLOOD PRESSURE: 167 MMHG | HEART RATE: 84 BPM | DIASTOLIC BLOOD PRESSURE: 75 MMHG | TEMPERATURE: 97 F | RESPIRATION RATE: 20 BRPM | OXYGEN SATURATION: 99 %

## 2017-07-23 VITALS
TEMPERATURE: 98.7 F | RESPIRATION RATE: 16 BRPM | DIASTOLIC BLOOD PRESSURE: 72 MMHG | OXYGEN SATURATION: 97 % | SYSTOLIC BLOOD PRESSURE: 90 MMHG | HEART RATE: 80 BPM

## 2017-07-23 VITALS — RESPIRATION RATE: 20 BRPM

## 2017-07-23 VITALS — DIASTOLIC BLOOD PRESSURE: 70 MMHG | SYSTOLIC BLOOD PRESSURE: 128 MMHG

## 2017-07-23 LAB
BASOPHILS # BLD AUTO: 0.1 TH/MM3 (ref 0–0.2)
BASOPHILS NFR BLD: 0.8 % (ref 0–2)
EOSINOPHIL # BLD: 0.3 TH/MM3 (ref 0–0.4)
EOSINOPHIL NFR BLD: 3.7 % (ref 0–4)
ERYTHROCYTE [DISTWIDTH] IN BLOOD BY AUTOMATED COUNT: 16.1 % (ref 11.6–17.2)
HCT VFR BLD CALC: 35.9 % (ref 35–46)
HEMO FLAGS: (no result)
LYMPHOCYTES # BLD AUTO: 2.5 TH/MM3 (ref 1–4.8)
LYMPHOCYTES NFR BLD AUTO: 35.1 % (ref 9–44)
MCH RBC QN AUTO: 25.9 PG (ref 27–34)
MCHC RBC AUTO-ENTMCNC: 32 % (ref 32–36)
MCV RBC AUTO: 80.9 FL (ref 80–100)
MONOCYTES NFR BLD: 10.1 % (ref 0–8)
NEUTROPHILS # BLD AUTO: 3.4 TH/MM3 (ref 1.8–7.7)
NEUTROPHILS NFR BLD AUTO: 50.3 % (ref 16–70)
PLATELET # BLD: 319 TH/MM3 (ref 150–450)
RBC # BLD AUTO: 4.44 MIL/MM3 (ref 4–5.3)
WBC # BLD AUTO: 7 TH/MM3 (ref 4–11)

## 2017-07-23 RX ADMIN — ACETAMINOPHEN PRN MG: 325 TABLET ORAL at 10:16

## 2017-07-23 RX ADMIN — STANDARDIZED SENNA CONCENTRATE AND DOCUSATE SODIUM SCH TAB: 8.6; 5 TABLET, FILM COATED ORAL at 10:10

## 2017-07-23 RX ADMIN — Medication SCH TAB: at 10:11

## 2017-07-23 RX ADMIN — FLUTICASONE PROPIONATE SCH SPRAY: 50 SPRAY, METERED NASAL at 10:09

## 2017-07-23 RX ADMIN — Medication SCH ML: at 09:00

## 2017-07-23 RX ADMIN — PANTOPRAZOLE SODIUM SCH MG: 20 TABLET, DELAYED RELEASE ORAL at 10:09

## 2017-07-23 NOTE — HHI.PR
Subjective


Remarks


No nausea, some headache





Objective


Vitals





 Vital Signs








  Date Time  Temp Pulse Resp B/P Pulse Ox O2 Delivery O2 Flow Rate FiO2


 


7/23/17 08:00 97.0 84 20 167/75 99   


 


7/23/17 04:00        


 


7/23/17 00:04 98.7 80 16 90/72 97   


 


7/22/17 20:53 98.1 85 18 144/86 96   


 


7/22/17 16:00 97.1 86 18 133/73 95   


 


7/22/17 12:00 97.9 86 18 129/78 96   














 7/22/17 7/22/17 7/23/17





 15:00 23:00 07:00


 


Intake Total 1045 ml  


 


Balance 1045 ml  


 


   


 


Intake Oral 1045 ml  


 


# Voids 8 2 3


 


# Bowel Movements 1  0








Result Diagram:  


7/23/17 0834                                                                   

             7/22/17 1253





Objective Remarks


lying in bed NAD


lungs cta


heart rrr jenni 2/6 through precordium


+bs no rebound





A/P


Problem List:  


(1) Symptomatic anemia


Status:  Chronic


Plan:  hgb imprved last pm after 3 units of blood 


if hgb repeat at 12 hrs is stable should be able to discharge home with f/u 

with gi for schedule pill cam. has had egds/colonoscopies w/o source of bleed


no further nausea, tolerating po, h/h stable


reassured, 


did well with PT will use a cane





(2) Hypertension


Status:  Chronic


Plan:  blood pressure has been good off medications


will continue to hold and f/u in office 





(3) Anxiety


Status:  Chronic


Plan:  on alprazolam but has not been on home dose of sertraline will resume


she gets very anxious , cont alprazolam


reassured


follow up with her psychiatrist as outpatient





Discharge Planning


plan for d/c  today





Problem Qualifiers





(1) Hypertension:  


Qualified Code:  I10 - Essential hypertension





Flower Gamez MD Jul 23, 2017 11:16

## 2017-07-23 NOTE — HHI.DS
Discharge Summary


Admission Date


Jul 20, 2017 at 11:45


Discharge Date:  Jul 23, 2017


Admitting Diagnosis


severe anemia





(1) Symptomatic anemia


Diagnosis:  Principal





(2) Hypertension


Diagnosis:  Secondary





(3) Anxiety


Diagnosis:  Secondary





Brief History


84-year-old female patient with previous history of anemia of uncertain 

etiology with history of transfusion previously, has recently been approved for 

a GI Study to evaluate for possible causes, presents to the ER today because 

she has been sent in by her primary care physician due to a follow-up lab work 

showing hemoglobin of 6.  Patient admits she has been having some dyspnea on 

exertion recently.  She denies any current chest pains, dizziness, or other 

symptoms.  She denies any black stools or blood in the stools.  Patient had 

endoscopy and colon test with some twist in colon and only was able to view to 

sigmoid and is scheduled for capsle endoscopy to look at small bowel.  Has had 

lightheadedness


hgb was 5.8 in ER


CBC/BMP:  


7/23/17 0834                                                                   

             7/22/17 1253





Significant Findings





Laboratory Tests








Test 7/21/17 7/21/17 7/22/17 7/23/17





 06:20 20:20 12:53 08:34


 


Red Blood Count 3.24 MIL/MM3 3.87 MIL/MM3  





 (4.00-5.30) (4.00-5.30)  


 


Hemoglobin 8.4 GM/DL 10.0 GM/DL 11.1 GM/DL 11.5 GM/DL





 (11.6-15.3) (11.6-15.3) (11.6-15.3) (11.6-15.3)


 


Hematocrit 25.6 % 31.3 % 34.7 % 





 (35.0-46.0) (35.0-46.0) (35.0-46.0) 


 


Mean Corpuscular Volume 79.3 FL   





 (80.0-100.0)   


 


Mean Corpuscular Hemoglobin 26.1 PG 25.7 PG 25.8 PG 25.9 PG





 (27.0-34.0) (27.0-34.0) (27.0-34.0) (27.0-34.0)


 


Monocytes (%) (Auto) 9.7 % (0.0-8.0) 10.0 % 9.7 % (0.0-8.0) 10.1 %





  (0.0-8.0)  (0.0-8.0)


 


Mean Corpuscular Hemoglobin  31.8 %  





Concent  (32.0-36.0)  


 


Basophils (%) (Auto)  4.3 % (0.0-2.0)  


 


Basophils # (Auto)  0.3 TH/MM3  





  (0-0.2)  


 


Blood Urea Nitrogen   19 MG/DL (7-18) 


 


Estimat Glomerular Filtration   65 ML/MIN (>89) 





Rate    


 


Random Glucose   108 MG/DL 





   () 








Imaging





Last Impressions








Chest X-Ray 7/22/17 0000 Signed





Impressions: 





 Service Date/Time:  Saturday, July 22, 2017 16:13 - CONCLUSION: No acute 





 disease.       Fritz Doyle MD 








PE at Discharge


lying in bed NAD


lungs cta


heart rrr jenni 2/6 through precordium


+bs no rebound


Hospital Course


Was admitted and transfused 3 units of PRBC's. Her hgb came up nicely . Her 

lisinopril was held on presentation and her blood pressure remained fairly well 

controlled. She had complaints of cough and congestion and chest xray was done 

and showed nothing acute. She was continued on her zyrtec and flonase. She was 

concerned about dark stools but in view of iron usage and stable h/h opted for 

discharge w/o obtaining the stool fobt ordered. She will be followed in 2 days 

with repeat cbc and blood pressure check. She is under the care of a 

psychiatrist for her anxiety and depression. Her alprazolam and sertraline were 

continued at her outpatient dosage.


Pt Condition on Discharge:  Stable


Discharge Disposition:  Discharge Home


Discharge Instructions


DIET: Follow Instructions for:  Heart Healthy Diet


Activities you can perform:  Regular-No Restrictions








Flower Gamez MD Jul 23, 2017 11:39

## 2017-08-18 ENCOUNTER — HOSPITAL ENCOUNTER (INPATIENT)
Dept: HOSPITAL 17 - PHED | Age: 82
LOS: 4 days | Discharge: HOME | DRG: 282 | End: 2017-08-22
Payer: MEDICARE

## 2017-08-18 VITALS
OXYGEN SATURATION: 95 % | SYSTOLIC BLOOD PRESSURE: 184 MMHG | HEART RATE: 103 BPM | DIASTOLIC BLOOD PRESSURE: 90 MMHG | RESPIRATION RATE: 24 BRPM

## 2017-08-18 VITALS
OXYGEN SATURATION: 90 % | SYSTOLIC BLOOD PRESSURE: 208 MMHG | TEMPERATURE: 98 F | DIASTOLIC BLOOD PRESSURE: 112 MMHG | HEART RATE: 112 BPM | RESPIRATION RATE: 30 BRPM

## 2017-08-18 VITALS — OXYGEN SATURATION: 96 % | RESPIRATION RATE: 30 BRPM

## 2017-08-18 VITALS — WEIGHT: 158.73 LBS | HEIGHT: 65 IN | BODY MASS INDEX: 26.45 KG/M2

## 2017-08-18 DIAGNOSIS — R19.7: ICD-10-CM

## 2017-08-18 DIAGNOSIS — F32.9: ICD-10-CM

## 2017-08-18 DIAGNOSIS — F41.9: ICD-10-CM

## 2017-08-18 DIAGNOSIS — I07.1: ICD-10-CM

## 2017-08-18 DIAGNOSIS — M48.06: ICD-10-CM

## 2017-08-18 DIAGNOSIS — I35.1: ICD-10-CM

## 2017-08-18 DIAGNOSIS — K21.9: ICD-10-CM

## 2017-08-18 DIAGNOSIS — K57.90: ICD-10-CM

## 2017-08-18 DIAGNOSIS — I27.2: ICD-10-CM

## 2017-08-18 DIAGNOSIS — K22.70: ICD-10-CM

## 2017-08-18 DIAGNOSIS — R54: ICD-10-CM

## 2017-08-18 DIAGNOSIS — E78.5: ICD-10-CM

## 2017-08-18 DIAGNOSIS — Z96.653: ICD-10-CM

## 2017-08-18 DIAGNOSIS — I35.0: ICD-10-CM

## 2017-08-18 DIAGNOSIS — Z87.891: ICD-10-CM

## 2017-08-18 DIAGNOSIS — I11.0: ICD-10-CM

## 2017-08-18 DIAGNOSIS — I21.4: Primary | ICD-10-CM

## 2017-08-18 DIAGNOSIS — M19.90: ICD-10-CM

## 2017-08-18 DIAGNOSIS — I50.9: ICD-10-CM

## 2017-08-18 DIAGNOSIS — I25.10: ICD-10-CM

## 2017-08-18 LAB
BASOPHILS # BLD AUTO: 0 TH/MM3 (ref 0–0.2)
BASOPHILS NFR BLD: 0.4 % (ref 0–2)
CHLORIDE SERPL-SCNC: 101 MEQ/L (ref 98–107)
EOSINOPHIL # BLD: 0.1 TH/MM3 (ref 0–0.4)
EOSINOPHIL NFR BLD: 1.3 % (ref 0–4)
ERYTHROCYTE [DISTWIDTH] IN BLOOD BY AUTOMATED COUNT: 18.7 % (ref 11.6–17.2)
HCT VFR BLD CALC: 32.8 % (ref 35–46)
HEMO FLAGS: (no result)
LYMPHOCYTES # BLD AUTO: 3 TH/MM3 (ref 1–4.8)
LYMPHOCYTES NFR BLD AUTO: 28.1 % (ref 9–44)
MCH RBC QN AUTO: 25.5 PG (ref 27–34)
MCHC RBC AUTO-ENTMCNC: 31.8 % (ref 32–36)
MCV RBC AUTO: 80.3 FL (ref 80–100)
MONOCYTES NFR BLD: 5.3 % (ref 0–8)
NEUTROPHILS # BLD AUTO: 6.9 TH/MM3 (ref 1.8–7.7)
NEUTROPHILS NFR BLD AUTO: 64.9 % (ref 16–70)
PLATELET # BLD: 353 TH/MM3 (ref 150–450)
POTASSIUM SERPL-SCNC: 3.5 MEQ/L (ref 3.5–5.1)
RBC # BLD AUTO: 4.08 MIL/MM3 (ref 4–5.3)
SODIUM SERPL-SCNC: 135 MEQ/L (ref 136–145)
WBC # BLD AUTO: 10.6 TH/MM3 (ref 4–11)

## 2017-08-18 PROCEDURE — 93005 ELECTROCARDIOGRAM TRACING: CPT

## 2017-08-18 PROCEDURE — C1893 INTRO/SHEATH, FIXED,NON-PEEL: HCPCS

## 2017-08-18 PROCEDURE — 81001 URINALYSIS AUTO W/SCOPE: CPT

## 2017-08-18 PROCEDURE — 94010 BREATHING CAPACITY TEST: CPT

## 2017-08-18 PROCEDURE — 93306 TTE W/DOPPLER COMPLETE: CPT

## 2017-08-18 PROCEDURE — 74174 CTA ABD&PLVS W/CONTRAST: CPT

## 2017-08-18 PROCEDURE — 93880 EXTRACRANIAL BILAT STUDY: CPT

## 2017-08-18 PROCEDURE — 85610 PROTHROMBIN TIME: CPT

## 2017-08-18 PROCEDURE — 80053 COMPREHEN METABOLIC PANEL: CPT

## 2017-08-18 PROCEDURE — 83735 ASSAY OF MAGNESIUM: CPT

## 2017-08-18 PROCEDURE — 93456 R HRT CORONARY ARTERY ANGIO: CPT

## 2017-08-18 PROCEDURE — 80048 BASIC METABOLIC PNL TOTAL CA: CPT

## 2017-08-18 PROCEDURE — 83880 ASSAY OF NATRIURETIC PEPTIDE: CPT

## 2017-08-18 PROCEDURE — G0378 HOSPITAL OBSERVATION PER HR: HCPCS

## 2017-08-18 PROCEDURE — 76937 US GUIDE VASCULAR ACCESS: CPT

## 2017-08-18 PROCEDURE — 85730 THROMBOPLASTIN TIME PARTIAL: CPT

## 2017-08-18 PROCEDURE — 82550 ASSAY OF CK (CPK): CPT

## 2017-08-18 PROCEDURE — 84484 ASSAY OF TROPONIN QUANT: CPT

## 2017-08-18 PROCEDURE — 71010: CPT

## 2017-08-18 PROCEDURE — 87641 MR-STAPH DNA AMP PROBE: CPT

## 2017-08-18 PROCEDURE — 85025 COMPLETE CBC W/AUTO DIFF WBC: CPT

## 2017-08-18 PROCEDURE — 96374 THER/PROPH/DIAG INJ IV PUSH: CPT

## 2017-08-18 PROCEDURE — C1769 GUIDE WIRE: HCPCS

## 2017-08-18 PROCEDURE — 82810 BLOOD GASES O2 SAT ONLY: CPT

## 2017-08-18 NOTE — RADRPT
EXAM DATE/TIME:  08/18/2017 23:35 

 

[facility] COMPARISON:     

CHEST SINGLE AP, May 31, 2017, 9:26.

 

                     

INDICATIONS :     

Shortness of breath. 

                     

 

MEDICAL HISTORY :            

Cardiovascular disease. Hypothyroidism   

 

SURGICAL HISTORY :        

Hysterectomy. Colonoscopy

 

ENCOUNTER:     

Initial                                        

 

ACUITY:     

1 day      

 

PAIN SCORE:     

0/10

 

LOCATION:     

Bilateral chest 

 

FINDINGS:     

Heart size enlarged. Mild edema pattern with small effusions. No pneumothorax.

 

CONCLUSION:     

1. Mild congestive heart failure.

 

 

 

 Jermaine Riddle MD on August 18, 2017 at 23:53           

Board Certified Radiologist.

 This report was verified electronically.

## 2017-08-18 NOTE — PD
HPI


Chief Complaint:  shortness of breath


Time Seen by Provider:  23:18


Travel History


International Travel<30 days:  No


Contact w/Intl Traveler<30days:  No


Traveled to known affect area:  No





History of Present Illness


HPI


This 84-year-old female is complaining of shortness of breath.  She says she 

went to bed and was feeling okay.  She woke up coughing and was very short of 

breath.  She has not had trouble like this before.  She has not smoked for many 

years.  She has a history of hypertension and was recently taken off of her 

blood pressure medicine.  She is not having any chest pain.  She was admitted 

to this hospital in July of this year with severe anemia and a hemoglobin of 

5.8.  She was transfused at that time.  She has been told recently that she has 

some fluid in her lungs.  She had an x-ray at [facility] on Tuesday 

she  She had her hemoglobin checked about a week ago and was told it was 10.  

Her oxygen saturation at home was 90%





PFSH


Past Medical History


Anemia:  Yes


Arthritis:  Yes


Autoimmune Disease:  No


Anxiety:  Yes


Depression:  Yes


Heart Rhythm Problems:  No


Cancer:  No


Cardiovascular Problems:  Yes


High Cholesterol:  Yes


Chest Pain:  No


Congestive Heart Failure:  No


Developmental Delay:  Yes


Diabetes:  No


Diminished Hearing:  No


Endocrine:  No


GERD:  Yes


Glaucoma:  No


Genitourinary:  No


Hepatitis:  No


Hiatal Hernia:  No


Hypertension:  Yes


Immune Disorder:  No


Implanted Vascular Access Dvce:  Yes


Kidney Stones:  No


Musculoskeletal:  Yes


Neurologic:  No


Psychiatric:  Yes


Reproductive:  No


Respiratory:  No


Immunizations Current:  Yes


Renal Failure:  No


Thyroid Disease:  No


Ulcer:  No


Menopausal:  Yes





Past Surgical History


Abdominal Surgery:  Yes (HYSTERECTOMY)


Body Medical Devices:  FRANKIE EYE LENS


Cardiac Surgery:  No


Ear Surgery:  No


Endocrine Surgery:  No


Eye Surgery:  Yes (BILATERAL CATARACT SX; LEFT RETINA SX x2)


Genitourinary Surgery:  No


Gynecologic Surgery:  Yes (HYSTERECTOMY in 1982)


Hysterectomy:  Yes


Joint Replacement:  Yes (Bilateral Knee)


Oral Surgery:  No


Thoracic Surgery:  No


Other Surgery:  Yes (LEFT TOTAL KNEE REPLACEMENT)





Social History


Alcohol Use:  Yes (occas)


Tobacco Use:  No (QUIT 30 YRS)


Substance Use:  No





Allergies-Medications


(Allergen,Severity, Reaction):  


Coded Allergies:  


     morphine (Unverified  Allergy, Intermediate, 8/18/17)


Reported Meds & Prescriptions





Reported Meds & Active Scripts


Active


Zoloft (Sertraline HCl) 50 Mg Tab 150 Mg PO DAILY


Poly-Iron 150 (Polysaccharide Iron Complex) 150 Mg Cap 150 Mg PO ONCE


Reported


Omeprazole 20 Mg Tab 20 Mg PO DAILY


Ocuvite (Multiple Vitamins W/ Minerals) 1 Tab 1 Tab PO DAILY


Cetirizine (Cetirizine HCl) 10 Mg Tab 10 Mg PO DAILY


Xanax (Alprazolam) 0.25 Mg Tab 0.25 Mg PO AS DIRECTED PRN


Lipitor (Atorvastatin Calcium) 10 Mg Tab 10 Mg PO HS








Review of Systems


General / Constitutional:  No: Fever, Chills


Eyes:  No: Diploplia, Blurred Vision


HENT:  No: Headaches, Vertigo


Cardiovascular:  No: Chest Pain or Discomfort


Respiratory:  Positive: Cough, Shortness of Breath


Gastrointestinal:  No: Nausea, Vomiting


Genitourinary:  No: Urgency, Frequency


Musculoskeletal:  No: Myalgias


Skin:  No Rash


Neurologic:  No: Weakness, Dizziness


Hematologic/Lymphatic:  No: Easy Bruising





Physical Exam


Narrative


GENERAL: [-] Patient arrives in marked respiratory distress.  She is receiving 

supplemental oxygen.  Initial blood pressure is 208/112


SKIN: Focused skin assessment warm/dry.


HEAD: Atraumatic. Normocephalic. 


EYES: Pupils equal and round. No scleral icterus. No injection or drainage. 


ENT: No nasal bleeding or discharge.  Mucous membranes pink and moist.


NECK: Trachea midline. No JVD. 


CARDIOVASCULAR: Regular rate and rhythm.  No murmur appreciated.


RESPIRATORY: There is accessory muscle use.  She has coarse rales bilaterally.  

There are occasional wheezes


GASTROINTESTINAL: Abdomen soft, non-tender, nondistended. Hepatic and splenic 

margins not palpable. 


MUSCULOSKELETAL: No obvious deformities. No clubbing.  No cyanosis.  No edema. 


NEUROLOGICAL: Awake and alert. No obvious cranial nerve deficits.  Motor 

grossly within normal limits. Normal speech.


PSYCHIATRIC: Appropriate mood and affect; insight and judgment normal.





Data


Data


Last Documented VS





Vital Signs








  Date Time  Temp Pulse Resp B/P Pulse Ox O2 Delivery O2 Flow Rate FiO2


 


8/18/17 23:51  103 24 184/90 95 Nasal Cannula 4 


 


8/18/17 23:23 98.0       








Orders





 Complete Blood Count With Diff (8/18/17 23:19)


Comprehensive Metabolic Panel (8/18/17 23:19)


B-Type Natriuretic Peptide (8/18/17 23:19)


Act Partial Throm Time (Ptt) (8/18/17 23:19)


Prothrombin Time / Inr (Pt) (8/18/17 23:19)


Magnesium (Mg) (8/18/17 23:19)


Troponin I (8/18/17 23:19)


Ua Includes Microscopic (8/18/17 23:19)


Iv Access Insert/Monitor (8/18/17 23:19)


Electrocardiogram (8/18/17 23:19)


Ecg Monitoring (8/18/17 23:19)


Oximetry (8/18/17 23:19)


Oxygen Administration (8/18/17 23:19)


Chest, Single Ap (8/18/17 23:19)


Sodium Chloride 0.9% Flush (Ns Flush) (8/18/17 23:30)


Furosemide Inj (Lasix Inj) (8/18/17 23:30)


Nitroglycerin 2% Oint (Nitroglycerin 2% (8/18/17 23:30)








MDM


Medical Decision Making


Medical Screen Exam Complete:  Yes


Emergency Medical Condition:  Yes


Medical Record Reviewed:  Yes


Differential Diagnosis


Differential includes CHF, acute pulmonary edema, COPD exacerbation, pneumonia


Narrative Course


EKG shows sinus rhythm.  There are nonspecific ST-T wave changes.  The EKG is 

similar to an EKG that was done on May 30, 2017.  EKG shows cardiomegaly with 

bilateral interstitial infiltrates consistent with CHF.  EKG is progressed from 

an EKG of 7/20/17





Diagnosis





 Primary Impression:  


 Acute pulmonary edema





Admitting Information


Admitting Physician Requests:  Admit








Stanislaw Magana MD Aug 18, 2017 23:21

## 2017-08-19 VITALS
OXYGEN SATURATION: 96 % | TEMPERATURE: 97.5 F | DIASTOLIC BLOOD PRESSURE: 70 MMHG | SYSTOLIC BLOOD PRESSURE: 112 MMHG | RESPIRATION RATE: 17 BRPM | HEART RATE: 82 BPM

## 2017-08-19 VITALS
RESPIRATION RATE: 18 BRPM | SYSTOLIC BLOOD PRESSURE: 109 MMHG | DIASTOLIC BLOOD PRESSURE: 61 MMHG | HEART RATE: 88 BPM | OXYGEN SATURATION: 95 % | TEMPERATURE: 97.7 F

## 2017-08-19 VITALS — OXYGEN SATURATION: 99 %

## 2017-08-19 VITALS — SYSTOLIC BLOOD PRESSURE: 158 MMHG | DIASTOLIC BLOOD PRESSURE: 77 MMHG

## 2017-08-19 VITALS
OXYGEN SATURATION: 95 % | DIASTOLIC BLOOD PRESSURE: 68 MMHG | TEMPERATURE: 97.8 F | SYSTOLIC BLOOD PRESSURE: 111 MMHG | HEART RATE: 96 BPM | RESPIRATION RATE: 22 BRPM

## 2017-08-19 VITALS
SYSTOLIC BLOOD PRESSURE: 125 MMHG | RESPIRATION RATE: 16 BRPM | OXYGEN SATURATION: 95 % | DIASTOLIC BLOOD PRESSURE: 87 MMHG | HEART RATE: 94 BPM

## 2017-08-19 VITALS
DIASTOLIC BLOOD PRESSURE: 84 MMHG | HEART RATE: 101 BPM | RESPIRATION RATE: 22 BRPM | SYSTOLIC BLOOD PRESSURE: 144 MMHG | TEMPERATURE: 96.4 F | OXYGEN SATURATION: 97 %

## 2017-08-19 VITALS
DIASTOLIC BLOOD PRESSURE: 85 MMHG | RESPIRATION RATE: 20 BRPM | SYSTOLIC BLOOD PRESSURE: 174 MMHG | HEART RATE: 102 BPM | OXYGEN SATURATION: 96 %

## 2017-08-19 VITALS
DIASTOLIC BLOOD PRESSURE: 76 MMHG | SYSTOLIC BLOOD PRESSURE: 124 MMHG | TEMPERATURE: 97.7 F | HEART RATE: 89 BPM | OXYGEN SATURATION: 94 %

## 2017-08-19 VITALS
SYSTOLIC BLOOD PRESSURE: 175 MMHG | OXYGEN SATURATION: 96 % | RESPIRATION RATE: 20 BRPM | DIASTOLIC BLOOD PRESSURE: 78 MMHG | HEART RATE: 102 BPM

## 2017-08-19 VITALS — OXYGEN SATURATION: 95 %

## 2017-08-19 VITALS — HEART RATE: 93 BPM

## 2017-08-19 VITALS — HEART RATE: 90 BPM

## 2017-08-19 LAB
ALP SERPL-CCNC: 91 U/L (ref 45–117)
ALT SERPL-CCNC: 39 U/L (ref 10–53)
ANION GAP SERPL CALC-SCNC: 9 MEQ/L (ref 5–15)
ANION GAP SERPL CALC-SCNC: 9 MEQ/L (ref 5–15)
APTT BLD: 23.3 SEC (ref 24.3–30.1)
AST SERPL-CCNC: 31 U/L (ref 15–37)
BACTERIA #/AREA URNS HPF: (no result) /HPF
BILIRUB SERPL-MCNC: 0.4 MG/DL (ref 0.2–1)
BUN SERPL-MCNC: 21 MG/DL (ref 7–18)
BUN SERPL-MCNC: 23 MG/DL (ref 7–18)
CHLORIDE SERPL-SCNC: 99 MEQ/L (ref 98–107)
CK SERPL-CCNC: 104 U/L (ref 26–192)
CK SERPL-CCNC: 117 U/L (ref 26–192)
COLOR UR: YELLOW
GFR SERPLBLD BASED ON 1.73 SQ M-ARVRAT: 57 ML/MIN (ref 89–?)
GFR SERPLBLD BASED ON 1.73 SQ M-ARVRAT: 62 ML/MIN (ref 89–?)
GLUCOSE UR STRIP-MCNC: (no result) MG/DL
HCO3 BLD-SCNC: 24.8 MEQ/L (ref 21–32)
HCO3 BLD-SCNC: 28.6 MEQ/L (ref 21–32)
HGB UR QL STRIP: (no result)
INR PPP: 0.9 RATIO
KETONES UR STRIP-MCNC: (no result) MG/DL
LEUKOCYTE ESTERASE UR QL STRIP: (no result) /HPF (ref 0–5)
MAGNESIUM SERPL-MCNC: 1.8 MG/DL (ref 1.5–2.5)
MAGNESIUM SERPL-MCNC: 2 MG/DL (ref 1.5–2.5)
NITRITE UR QL STRIP: (no result)
POTASSIUM SERPL-SCNC: 3.4 MEQ/L (ref 3.5–5.1)
PROTHROMBIN TIME: 10.3 SEC (ref 9.8–11.6)
RBC #/AREA URNS HPF: (no result) /HPF (ref 0–3)
SODIUM SERPL-SCNC: 137 MEQ/L (ref 136–145)
SP GR UR STRIP: 1.01 (ref 1–1.03)
SQUAMOUS #/AREA URNS HPF: (no result) /HPF (ref 0–5)

## 2017-08-19 RX ADMIN — LISINOPRIL SCH MG: 10 TABLET ORAL at 01:36

## 2017-08-19 RX ADMIN — Medication SCH ML: at 21:07

## 2017-08-19 RX ADMIN — CETIRIZINE HYDROCHLORIDE SCH MG: 10 TABLET, FILM COATED ORAL at 09:07

## 2017-08-19 RX ADMIN — LISINOPRIL SCH MG: 10 TABLET ORAL at 08:57

## 2017-08-19 RX ADMIN — Medication SCH ML: at 07:56

## 2017-08-19 RX ADMIN — ATORVASTATIN CALCIUM SCH MG: 10 TABLET, FILM COATED ORAL at 01:37

## 2017-08-19 RX ADMIN — POTASSIUM CHLORIDE SCH MEQ: 20 TABLET, EXTENDED RELEASE ORAL at 09:08

## 2017-08-19 RX ADMIN — POTASSIUM CHLORIDE SCH MEQ: 20 TABLET, EXTENDED RELEASE ORAL at 01:38

## 2017-08-19 RX ADMIN — Medication SCH TAB: at 09:00

## 2017-08-19 RX ADMIN — ATORVASTATIN CALCIUM SCH MG: 10 TABLET, FILM COATED ORAL at 21:06

## 2017-08-19 RX ADMIN — SERTRALINE HYDROCHLORIDE SCH MG: 50 TABLET, FILM COATED ORAL at 09:08

## 2017-08-19 RX ADMIN — PANTOPRAZOLE SODIUM SCH MG: 20 TABLET, DELAYED RELEASE ORAL at 09:07

## 2017-08-19 NOTE — EKG
Date Performed: 08/18/2017       Time Performed: 23:34:49

 

PTAGE:      84 years

 

EKG:      SINUS TACHYCARDIA Atrial abnormality Diffuse nonspecific ST-T change Compared to previous t
racing, no significant change ABNORMAL ECG

 

PREVIOUS TRACING       : 05/30/2017 07.49

 

DOCTOR:   Jaxson Curry  Interpretating Date/Time  08/19/2017 15:15:32

## 2017-08-19 NOTE — PD
Physical Exam


Time Seen by Provider:  00:17


Narrative


Dr. Hopkins left this patient with me to check the laboratory work, chest x-ray 

and make a dispositionhe suggested admission.  Apparently, Dr. Jason Cook 

called in a prescription for Lasix at 5 PM yesterday but the patient could not 

get it filled because [facility] pharmacy closed at about 6.  She was 

unable to fill the prescription.





Data


Data


Last Documented VS





Vital Signs








  Date Time  Temp Pulse Resp B/P Pulse Ox O2 Delivery O2 Flow Rate FiO2


 


8/19/17 00:10  102 20 174/85 96 Nasal Cannula 4 


 


8/18/17 23:23 98.0       








Orders





 Complete Blood Count With Diff (8/18/17 23:19)


Comprehensive Metabolic Panel (8/18/17 23:19)


B-Type Natriuretic Peptide (8/18/17 23:19)


Act Partial Throm Time (Ptt) (8/18/17 23:19)


Prothrombin Time / Inr (Pt) (8/18/17 23:19)


Magnesium (Mg) (8/18/17 23:19)


Troponin I (8/18/17 23:19)


Ua Includes Microscopic (8/18/17 23:19)


Iv Access Insert/Monitor (8/18/17 23:19)


Electrocardiogram (8/18/17 23:19)


Ecg Monitoring (8/18/17 23:19)


Oximetry (8/18/17 23:19)


Oxygen Administration (8/18/17 23:19)


Chest, Single Ap (8/18/17 23:19)


Sodium Chloride 0.9% Flush (Ns Flush) (8/18/17 23:30)


Furosemide Inj (Lasix Inj) (8/18/17 23:30)


Nitroglycerin 2% Oint (Nitroglycerin 2% (8/18/17 23:30)





Labs





 Laboratory Tests








Test 8/18/17





 23:35


 


White Blood Count 10.6 TH/MM3


 


Red Blood Count 4.08 MIL/MM3


 


Hemoglobin 10.4 GM/DL


 


Hematocrit 32.8 %


 


Mean Corpuscular Volume 80.3 FL


 


Mean Corpuscular Hemoglobin 25.5 PG


 


Mean Corpuscular Hemoglobin 31.8 %





Concent 


 


Red Cell Distribution Width 18.7 %


 


Platelet Count 353 TH/MM3


 


Mean Platelet Volume 9.0 FL


 


Neutrophils (%) (Auto) 64.9 %


 


Lymphocytes (%) (Auto) 28.1 %


 


Monocytes (%) (Auto) 5.3 %


 


Eosinophils (%) (Auto) 1.3 %


 


Basophils (%) (Auto) 0.4 %


 


Neutrophils # (Auto) 6.9 TH/MM3


 


Lymphocytes # (Auto) 3.0 TH/MM3


 


Monocytes # (Auto) 0.6 TH/MM3


 


Eosinophils # (Auto) 0.1 TH/MM3


 


Basophils # (Auto) 0.0 TH/MM3


 


CBC Comment DIFF FINAL 


 


Differential Comment  


 


Prothrombin Time 10.3 SEC


 


Prothromb Time International 0.9 RATIO





Ratio 


 


Activated Partial 23.3 SEC





Thromboplast Time 


 


Sodium Level 135 MEQ/L


 


Potassium Level 3.5 MEQ/L


 


Chloride Level 101 MEQ/L


 


Carbon Dioxide Level 24.8 MEQ/L


 


Anion Gap 9 MEQ/L


 


Blood Urea Nitrogen 23 MG/DL


 


Creatinine 0.93 MG/DL


 


Estimat Glomerular Filtration 57 ML/MIN





Rate 


 


Random Glucose 157 MG/DL


 


Calcium Level 8.6 MG/DL


 


Magnesium Level 2.0 MG/DL


 


Total Bilirubin 0.4 MG/DL


 


Aspartate Amino Transf 31 U/L





(AST/SGOT) 


 


Alanine Aminotransferase 39 U/L





(ALT/SGPT) 


 


Alkaline Phosphatase 91 U/L


 


Troponin I 0.06 NG/ML


 


B-Type Natriuretic Peptide 638 PG/ML


 


Total Protein 7.3 GM/DL


 


Albumin 3.6 GM/DL











[facility]


Medical Record Reviewed:  Yes


Supervised Visit with ANALILIA:  Yes


Interpretation(s)


The chest x-ray shows mild edema pattern with small effusions and enlarged 

heart.  Impression is congestive heart failure.  The complete metabolic profile 

shows a sodium of 135, BUN of 23, GFR 57, glucose 157 but is otherwise normal.  

The CBC shows a hemoglobin of 10.4 and hematocrit of 32.8 but is otherwise 

unremarkable.  The troponin I is 0.06 and the BNP is 638.


Differential Diagnosis


Congestive heart failure, acute coronary syndrome, electrolyte disorder, 

pulmonary edema


Narrative Course


The patient has congestive heart failure.  She was fairly severely short of 

breath when she came in now is doing much better.


Physician Communication


Physician Communication


I discussed the patient with Dr. Jason Cook, she states that she has a 

chronically elevated troponin I.  Also, the patient does have a history of 

anxiety.  We will 23 hour observation the patient and the patient will likely 

go home tomorrow.  The patient does not want to go home now, she is anxious and 

states she is all alone.





Diagnosis





 Primary Impression:  


 Acute pulmonary edema


 Additional Impressions:  


 Anxiety


 Elevated troponin I level





Admitting Information


Admitting Physician Requests:  Observation








Kemal Cifuentes MD Aug 19, 2017 00:24

## 2017-08-19 NOTE — EKG
Date Performed: 08/19/2017       Time Performed: 01:08:10

 

PTAGE:      84 years

 

EKG:      SINUS TACHYCARDIA Atrial Abnormality Diffuse nonspecific ST-T wave change Compared to prior
 tracing no significant change ABNORMAL ECG

 

PREVIOUS TRACING       : 08/18/2017 23.34

 

DOCTOR:   Jaxson Curry  Interpretating Date/Time  08/19/2017 15:15:58

## 2017-08-19 NOTE — MB
cc:

ALVIN GREWAL MD

****

 

 

DATE OF CONSULTATION:  08/19/2017.

 

REASON FOR CONSULTATION:

Congestive heart failure and aortic stenosis.

 

HISTORY OF PRESENT ILLNESS:

The patient is a very pleasant 84-year-old woman who had no prior cardiac

history but presented with worsening shortness of breath.  This was complicated

by anemia previously; however, she was found to have a notable murmur and an

echocardiogram was ordered, which showed severe aortic stenosis.

 

The patient has been given Lasix, which has made her feel much better.  She is

now completely asymptomatic denying any residual shortness of breath. She notes

chest pain, lightheadedness, dizziness or syncope.

 

PAST MEDICAL HISTORY:

1. Iron deficiency anemia.

2. Diverticulosis.

3. Valdez's esophagus.

4. Hypertension.

5. Hyperlipidemia.

6. Anxiety.

 

CURRENT MEDICATIONS:

1. Zyrtec.

2. Protonix.

3. Potassium.

4. Xanax.

5. Lipitor.

 

ALLERGIES:

MORPHINE.

 

PHYSICAL EXAMINATION:

VITAL SIGNS: Afebrile, pulse 82, respiratory rate 17, blood pressure 112/70,

satting 96 on two liters.

GENERAL:  A pleasant elderly woman in no distress.

NECK: No jugular venous distention.

LUNGS: Clear to auscultation bilaterally.

CARDIOVASCULAR: Regular rate and rhythm. A 3/6 systolic murmur is appreciated.

ABDOMEN: Benign.

EXTREMITIES: No edema.

 

LABORATORY DATA:

Sodium 137, potassium 3.4, chloride 99, bicarb 20.6, BUN 21, creatinine 0.87,

glucose 104.

 

Troponins are flat at 0.06 x3.

 

BNP is 638.

 

CARDIOLOGY TESTING:

Echocardiogram is notable for an ejection fraction of 55-60% with severe aortic

stenosis with a mean gradient of 44 mmHg.

 

IMPRESSION:

CHF with severe stenosis.

 

The patient is now compensated but she will require aortic valve replacement

due to the severe nature and now CHF requiring hospitalization.

 

I discussed this at length with the patient and she agrees to being transferred

to the [facility] for an AVR workup.  This will require cardiac catheterization,

which likely will be performed on Monday by one my partners.

 

Further recommendations will be based on her clinical course.  She does seem

well-compensated now from a CHF standpoint.

 

 

 

                              _________________________________

                              AlvinMD MIHAELA Mcwilliams/SANNA

D:  8/19/2017/4:28 PM

T:  8/19/2017/4:38 PM

Visit #:  [id number]

Job #:  [id number]

## 2017-08-20 VITALS — HEART RATE: 80 BPM

## 2017-08-20 VITALS
OXYGEN SATURATION: 98 % | DIASTOLIC BLOOD PRESSURE: 74 MMHG | TEMPERATURE: 98.2 F | HEART RATE: 88 BPM | SYSTOLIC BLOOD PRESSURE: 127 MMHG

## 2017-08-20 VITALS
DIASTOLIC BLOOD PRESSURE: 75 MMHG | OXYGEN SATURATION: 95 % | SYSTOLIC BLOOD PRESSURE: 104 MMHG | RESPIRATION RATE: 17 BRPM | TEMPERATURE: 97.3 F | HEART RATE: 82 BPM

## 2017-08-20 VITALS
SYSTOLIC BLOOD PRESSURE: 116 MMHG | HEART RATE: 90 BPM | DIASTOLIC BLOOD PRESSURE: 70 MMHG | OXYGEN SATURATION: 96 % | TEMPERATURE: 98.2 F

## 2017-08-20 VITALS
DIASTOLIC BLOOD PRESSURE: 70 MMHG | TEMPERATURE: 98 F | HEART RATE: 88 BPM | RESPIRATION RATE: 16 BRPM | SYSTOLIC BLOOD PRESSURE: 117 MMHG | OXYGEN SATURATION: 96 %

## 2017-08-20 VITALS — HEART RATE: 94 BPM

## 2017-08-20 VITALS
DIASTOLIC BLOOD PRESSURE: 72 MMHG | TEMPERATURE: 97.3 F | HEART RATE: 84 BPM | SYSTOLIC BLOOD PRESSURE: 117 MMHG | OXYGEN SATURATION: 92 %

## 2017-08-20 VITALS — OXYGEN SATURATION: 98 %

## 2017-08-20 VITALS
RESPIRATION RATE: 16 BRPM | SYSTOLIC BLOOD PRESSURE: 118 MMHG | HEART RATE: 80 BPM | TEMPERATURE: 98 F | DIASTOLIC BLOOD PRESSURE: 77 MMHG | OXYGEN SATURATION: 96 %

## 2017-08-20 VITALS — HEART RATE: 88 BPM

## 2017-08-20 VITALS — HEART RATE: 81 BPM

## 2017-08-20 VITALS — HEART RATE: 86 BPM

## 2017-08-20 VITALS — HEART RATE: 85 BPM

## 2017-08-20 VITALS — OXYGEN SATURATION: 97 %

## 2017-08-20 VITALS — HEART RATE: 75 BPM

## 2017-08-20 VITALS — HEART RATE: 79 BPM

## 2017-08-20 VITALS — HEART RATE: 90 BPM

## 2017-08-20 VITALS — HEART RATE: 89 BPM

## 2017-08-20 VITALS — HEART RATE: 84 BPM

## 2017-08-20 VITALS — HEART RATE: 83 BPM

## 2017-08-20 VITALS — HEART RATE: 78 BPM

## 2017-08-20 LAB
ANION GAP SERPL CALC-SCNC: 9 MEQ/L (ref 5–15)
BASOPHILS # BLD AUTO: 0.1 TH/MM3 (ref 0–0.2)
BASOPHILS NFR BLD: 0.8 % (ref 0–2)
BUN SERPL-MCNC: 28 MG/DL (ref 7–18)
CHLORIDE SERPL-SCNC: 99 MEQ/L (ref 98–107)
EOSINOPHIL # BLD: 0.4 TH/MM3 (ref 0–0.4)
EOSINOPHIL NFR BLD: 4.5 % (ref 0–4)
ERYTHROCYTE [DISTWIDTH] IN BLOOD BY AUTOMATED COUNT: 19.5 % (ref 11.6–17.2)
GFR SERPLBLD BASED ON 1.73 SQ M-ARVRAT: 55 ML/MIN (ref 89–?)
HCO3 BLD-SCNC: 29.2 MEQ/L (ref 21–32)
HCT VFR BLD CALC: 34.1 % (ref 35–46)
HEMO FLAGS: (no result)
LYMPHOCYTES # BLD AUTO: 2.1 TH/MM3 (ref 1–4.8)
LYMPHOCYTES NFR BLD AUTO: 23.6 % (ref 9–44)
MCH RBC QN AUTO: 25.2 PG (ref 27–34)
MCHC RBC AUTO-ENTMCNC: 31.3 % (ref 32–36)
MCV RBC AUTO: 80.6 FL (ref 80–100)
MONOCYTES NFR BLD: 9.8 % (ref 0–8)
NEUTROPHILS # BLD AUTO: 5.5 TH/MM3 (ref 1.8–7.7)
NEUTROPHILS NFR BLD AUTO: 61.3 % (ref 16–70)
PLATELET # BLD: 330 TH/MM3 (ref 150–450)
POTASSIUM SERPL-SCNC: 4.1 MEQ/L (ref 3.5–5.1)
RBC # BLD AUTO: 4.24 MIL/MM3 (ref 4–5.3)
SODIUM SERPL-SCNC: 137 MEQ/L (ref 136–145)
WBC # BLD AUTO: 9 TH/MM3 (ref 4–11)

## 2017-08-20 RX ADMIN — Medication SCH ML: at 09:37

## 2017-08-20 RX ADMIN — CETIRIZINE HYDROCHLORIDE SCH MG: 10 TABLET, FILM COATED ORAL at 09:37

## 2017-08-20 RX ADMIN — SERTRALINE HYDROCHLORIDE SCH MG: 50 TABLET, FILM COATED ORAL at 09:37

## 2017-08-20 RX ADMIN — Medication SCH TAB: at 09:37

## 2017-08-20 RX ADMIN — POTASSIUM CHLORIDE SCH MEQ: 20 TABLET, EXTENDED RELEASE ORAL at 09:37

## 2017-08-20 RX ADMIN — Medication SCH ML: at 20:12

## 2017-08-20 RX ADMIN — FUROSEMIDE SCH MG: 20 TABLET ORAL at 10:30

## 2017-08-20 RX ADMIN — PANTOPRAZOLE SODIUM SCH MG: 20 TABLET, DELAYED RELEASE ORAL at 09:37

## 2017-08-20 RX ADMIN — ATORVASTATIN CALCIUM SCH MG: 10 TABLET, FILM COATED ORAL at 20:12

## 2017-08-20 NOTE — EKG
Date Performed: 08/19/2017       Time Performed: 06:32:32

 

PTAGE:      84 years

 

EKG:      Sinus rhythm 

 

 Diffuse nonspecific ST-T wave change Atrial abnormality Compared to previous tracing, T wave changes
 are slightly more prominent anterolaterally, otherwise no significant change ABNORMAL ECG

 

PREVIOUS TRACING       : 08/19/2017 01.08

 

DOCTOR:   Jaxson Curry  Interpretating Date/Time  08/20/2017 15:22:16

## 2017-08-20 NOTE — HHI.PR
Subjective


Remarks


83 y/o F admitted with SOB d/t CHF.


Pt found to have severe AV stenosis on echocardiogram.


Pt transferred from [facility] to [facility].


Pt will need further w/u for consideration of AVR.


Today pt's SOB is much improved from admission.





Objective


Vitals





Vital Signs








  Date Time  Temp Pulse Resp B/P (MAP) Pulse Ox O2 Delivery O2 Flow Rate FiO2


 


8/20/17 15:30 98.0 88 16 117/70 (86) 96   


 


8/20/17 11:30 98.0 80 16 118/77 (91) 96   


 


8/20/17 07:55     97 Nasal Cannula 2.00 


 


8/20/17 07:25 97.3 82 17 104/75 (85) 95   


 


8/20/17 06:38  75      


 


8/20/17 05:16  80      


 


8/20/17 04:29  79      


 


8/20/17 03:00  76      


 


8/20/17 03:00 97.3 84  117/72 (87) 92   


 


8/20/17 02:00  86      


 


8/20/17 01:00  84      


 


8/20/17 00:00  86      


 


8/19/17 23:40 97.7 89  124/76 (92) 94   


 


8/19/17 23:00  90      


 


8/19/17 22:49     95 Nasal Cannula 2.00 


 


8/19/17 20:08 97.8 96 22 111/68 (82) 95   








Result Diagram:  


8/18/17 9367                                                                   

             8/19/17 1330





Imaging





Last Impressions








Chest X-Ray 8/18/17 2319 Signed





Impressions: 





 Service Date/Time:  Friday, August 18, 2017 23:35 - CONCLUSION:  1. Mild 





 congestive heart failure.     Jermaine Riddle MD 








Objective Remarks


GENERAL: This is a well-nourished, well-developed patient, in no apparent 

distress.


CARDIOVASCULAR: Regular rate and rhythm without murmurs, gallops, or rubs. 


RESPIRATORY: Clear to auscultation. Breath sounds equal bilaterally. No wheezes

, rales, or rhonchi.  


GASTROINTESTINAL: Abdomen soft, non-tender, nondistended. Normal active bowel 

sounds


MUSCULOSKELETAL: Extremities without clubbing, cyanosis, or edema.


NEURO:  Alert & Oriented x4 to person, place, time, situation.  Moves all ext x4





A/P


Problem List:  


(1) CHF (congestive heart failure)


ICD Codes:  I50.9 - Heart failure, unspecified


Status:  Acute


Plan:  - Pt admitted with SOB


- Pt clinically improved with lasix


- Echocardiogram (8/19/17) --> severe Aortic Valve Stenosis


- Pt will require Aortic Valve repair


- Pt transferred from [facility] to [facility]


- Pt will require C, likely 8/21/17


- NPO after MN


- supportive care


- SCDs for DVT prophylaxis





(2) Aortic valve stenosis, severe


ICD Codes:  I35.0 - Nonrheumatic aortic (valve) stenosis


Status:  Acute


Plan:  - see above





(3) Anxiety


ICD Codes:  F41.9 - Anxiety disorder, unspecified


Status:  Chronic


Plan:  


- zoloft, xanax





(4) H/O: GI bleed


ICD Codes:  Z87.19 - Personal history of other diseases of the digestive system


Plan:  


- continued PPI


- observe








Problem Qualifiers





(1) CHF (congestive heart failure):  


Qualified Codes:  I50.9 - Heart failure, unspecified








Zia Puckett DO Aug 20, 2017 16:48

## 2017-08-21 VITALS
DIASTOLIC BLOOD PRESSURE: 77 MMHG | RESPIRATION RATE: 16 BRPM | HEART RATE: 90 BPM | SYSTOLIC BLOOD PRESSURE: 119 MMHG | OXYGEN SATURATION: 97 % | TEMPERATURE: 98 F

## 2017-08-21 VITALS
OXYGEN SATURATION: 99 % | RESPIRATION RATE: 18 BRPM | SYSTOLIC BLOOD PRESSURE: 134 MMHG | HEART RATE: 81 BPM | DIASTOLIC BLOOD PRESSURE: 85 MMHG | TEMPERATURE: 97.9 F

## 2017-08-21 VITALS
DIASTOLIC BLOOD PRESSURE: 77 MMHG | SYSTOLIC BLOOD PRESSURE: 130 MMHG | HEART RATE: 90 BPM | OXYGEN SATURATION: 96 % | TEMPERATURE: 97.8 F

## 2017-08-21 VITALS — OXYGEN SATURATION: 96 %

## 2017-08-21 VITALS — HEART RATE: 87 BPM

## 2017-08-21 VITALS — HEART RATE: 89 BPM

## 2017-08-21 VITALS
HEART RATE: 81 BPM | DIASTOLIC BLOOD PRESSURE: 85 MMHG | SYSTOLIC BLOOD PRESSURE: 134 MMHG | OXYGEN SATURATION: 99 % | TEMPERATURE: 97.9 F | RESPIRATION RATE: 18 BRPM

## 2017-08-21 VITALS — HEART RATE: 83 BPM

## 2017-08-21 VITALS — HEART RATE: 84 BPM

## 2017-08-21 VITALS
DIASTOLIC BLOOD PRESSURE: 85 MMHG | RESPIRATION RATE: 18 BRPM | TEMPERATURE: 97.9 F | SYSTOLIC BLOOD PRESSURE: 134 MMHG | OXYGEN SATURATION: 99 % | HEART RATE: 84 BPM

## 2017-08-21 VITALS — HEART RATE: 82 BPM

## 2017-08-21 VITALS — HEART RATE: 80 BPM

## 2017-08-21 VITALS — HEART RATE: 88 BPM

## 2017-08-21 VITALS — HEART RATE: 86 BPM

## 2017-08-21 VITALS — OXYGEN SATURATION: 98 %

## 2017-08-21 VITALS — HEART RATE: 93 BPM

## 2017-08-21 VITALS — HEART RATE: 78 BPM

## 2017-08-21 LAB
BASOPHILS # BLD AUTO: 0 TH/MM3 (ref 0–0.2)
BASOPHILS NFR BLD: 0.2 % (ref 0–2)
EOSINOPHIL # BLD: 0.3 TH/MM3 (ref 0–0.4)
EOSINOPHIL NFR BLD: 4.4 % (ref 0–4)
ERYTHROCYTE [DISTWIDTH] IN BLOOD BY AUTOMATED COUNT: 19.9 % (ref 11.6–17.2)
HCT VFR BLD CALC: 32.9 % (ref 35–46)
HEMO FLAGS: (no result)
LYMPHOCYTES # BLD AUTO: 2.3 TH/MM3 (ref 1–4.8)
LYMPHOCYTES NFR BLD AUTO: 34.6 % (ref 9–44)
MCH RBC QN AUTO: 25.6 PG (ref 27–34)
MCHC RBC AUTO-ENTMCNC: 31.8 % (ref 32–36)
MCV RBC AUTO: 80.4 FL (ref 80–100)
MONOCYTES NFR BLD: 9.4 % (ref 0–8)
NEUTROPHILS # BLD AUTO: 3.5 TH/MM3 (ref 1.8–7.7)
NEUTROPHILS NFR BLD AUTO: 51.4 % (ref 16–70)
PLATELET # BLD: 277 TH/MM3 (ref 150–450)
RBC # BLD AUTO: 4.09 MIL/MM3 (ref 4–5.3)
WBC # BLD AUTO: 6.8 TH/MM3 (ref 4–11)

## 2017-08-21 PROCEDURE — 4A023N8 MEASUREMENT OF CARDIAC SAMPLING AND PRESSURE, BILATERAL, PERCUTANEOUS APPROACH: ICD-10-PCS | Performed by: INTERNAL MEDICINE

## 2017-08-21 PROCEDURE — B2111ZZ FLUOROSCOPY OF MULTIPLE CORONARY ARTERIES USING LOW OSMOLAR CONTRAST: ICD-10-PCS | Performed by: INTERNAL MEDICINE

## 2017-08-21 RX ADMIN — Medication SCH ML: at 20:39

## 2017-08-21 RX ADMIN — Medication SCH TAB: at 09:02

## 2017-08-21 RX ADMIN — CETIRIZINE HYDROCHLORIDE SCH MG: 10 TABLET, FILM COATED ORAL at 08:58

## 2017-08-21 RX ADMIN — Medication SCH ML: at 09:03

## 2017-08-21 RX ADMIN — PHENYTOIN SODIUM SCH MLS/HR: 50 INJECTION INTRAMUSCULAR; INTRAVENOUS at 08:00

## 2017-08-21 RX ADMIN — SERTRALINE HYDROCHLORIDE SCH MG: 50 TABLET, FILM COATED ORAL at 08:59

## 2017-08-21 RX ADMIN — POTASSIUM CHLORIDE SCH MEQ: 20 TABLET, EXTENDED RELEASE ORAL at 08:58

## 2017-08-21 RX ADMIN — PANTOPRAZOLE SODIUM SCH MG: 20 TABLET, DELAYED RELEASE ORAL at 09:00

## 2017-08-21 RX ADMIN — FUROSEMIDE SCH MG: 20 TABLET ORAL at 08:58

## 2017-08-21 RX ADMIN — ATORVASTATIN CALCIUM SCH MG: 10 TABLET, FILM COATED ORAL at 20:39

## 2017-08-21 NOTE — CATHPROC
[employer] HIS Report

Study Information

Study Number    Admission           Scheduled Start             Study Start

 

[id number]    Aug 20 2017 3:21PM      08/21/2017                Aug 21 2017 12:49PM

 

Universal Service

 

Cardiac Catheterization

 

Admit Source               Facility Department

 

Other                   [facility] - Cath Lab

 

Physician and Clinical Staff

Initial Sung Rg          Circulator     Mandy Fenton,RN

 

                         Recorder      Rosemary Iraheta,(R)

 

                         Recorder      Krystyna Jin RCIS TECH2

 

                         Scrub        Eugene Dougherty RCIS(BS)

 

Procedures Performed

Procedure                     Location (Site)           Vessel Name

 

Coronary Angiograms                 LCA                Left Coronary

Coronary Angiograms                 RCA                Right Coronary



Wire insertion                   Fem Art (right)          Femoral Art

Equipment

Time            Description          Size      Mfg Part Number  Used/Scraped

       ARROW INTERNATIONAL CATHETER, FR.7 BALLOON               [id number]

13:48                                FR 7               Used

       INC.        WEDGE PRESSURE                   *[id number]

                   TRANSDUCER, [email or web address].meAVE              [id number]

13:48    CHAVIRA JONES                     *                 Used

                   W/STOCKCOCK                  *[id number]

                                          534-645T



                                          *[id number]

                                          534-520T



                                          *[id number]

                                          534-620T



                                          *[id number]

                                          534-521T



                                          *[id number]

                                          534-621T



                                          *[id number]

                   WIRE, STRT MOVEABLE CORE           [phone]



                   .035                     *[id number]

                                          [id number]

13:48    [employer]    PACK, CCL CUSTOM       *                 Used

                                          *[id number]

                                          [id number]

13:48    [employer] PACER      PEN, SKIN DUAL W/ RULER    *                 Used

                                          *[id number]

                                          [id number]

13:48    [employer]      WIRE, 3MMJ .035 180CM     180CM               Used

                                          *[id number]

                                          [id number]

13:54    [employer]      WIRE, EXCHANGE 260CM 3MMJ   260CM               Used

                                          *[id number]

                                          [id number]

13:48    NAMIC          MANIFOLD, 2 PORT       *                 Used

                                          *[id number]

                                          [id number]

13:48    NAMIC          MANIFOLD, 4 PORT       *                 Used

                                          *[id number]

13:48    [employer]         OMNIPAQUE, 350 MG, 150ML   150ML      [id number]      Used

                                          [id number]

13:48    [employer]      BLANKET,WARM AIR CCL     *                 Used

                                          *[id number]

                                          [id number]

13:48    [employer]      SHEATH, FR5 TERUMO (10CM)   FR 5               Used

                                          *[id number]

                                          [id number]

13:36    [employer]      SHEATH, FR6 TERUMO (10CM)   FR 6               Used

                                          *[id number]

                                          [id number]

13:48    [employer]      SHEATH, FR7 TERUMO (10CM)   FR 7               Used

                                          *[id number]

14:00    [employer]    PIGTAIL DUAL LUMEN CATHETER  FR 6      [id number] *[id number]   Used

 

History: Current Medications

Medication         Dosage/Unit       Route     Frequency  Last Date/Time Taken

 

Statins (any)

 

ASA

 

LASIX

 

 

History: Allergies

Allergy              Reaction

 

morphine

History: Risk Factors

                     Family History of

Hypertension     Dyslipidemia                 Previous MI    Previous Heart Failure

                     Premature CAD

Yes         Yes         No            No         No

Prior Valve

           Prior PCI      Prior CABG

Surgery

No          No         No

           Cerebrovascular   Peripheral Artery     Chronic Lung

On Dialysis                                      Diabetes

           Disease       Disease          Disease

No          No         No            No         No

 

 

History: Symptoms/Diagnosis Selection Items

SOB

 

 

History: Stress Tests

Stress or Imaging Studies Performed

 

No

 

 

History: Other Disease Selection Items

CHF

 

HTN

 

 

History: Other

Current Smoker      Quit

 

No            37 Years Ago

 

Labs

Hgb (g/dl)        Hct (%)       WBC (l/cumm)        Platelets (thousands)

 

11.60-17.00        35.00-51.00     4.00-11.00         150..00

 

10.4           32.9        6.8             277

 

Glucose (mg/dl)      BUN (mg/dl)     Creatinine (mg/dl)    BUN:Creatinine (1:x)

74..00       7.00-18.00     0.50-1.30         10.00-20.00

 

88            28         0.9            31.1

 

Na (meq/l)        K (meq/l)

 

136..00       3.50-5.10

 

137            4.1

 

INR (PTT:PT)

 

0.90-1.10

 

0.9

 

Troponin I (ng/ml)    CPK (u/l)      CPK-MB (ng/ML)

 

0.02-0.05         26..00    0.50-3.60

 

0.06           104         Not Drawn

 

 

 

 

Medication

Medication Total Dose (Bolus/Oral)

Medication             Total Dosage/Unit

 

1% XYLOCAINE               20 mL

 

FENTANYL                 75 mcg

 

VERSED                   2 mg

 

Medications (Bolus/Oral)

Medication          Time Given          Dosage/Unit      Administered By      Reason

 

FENTANYL           8/21/2017 1:35:57 PM     50 mcg        Mandy Fenton

50 mcg FENTANYL given in lab by Mandy Fenton RN in Left Forearm via Peripheral IV. Ordered by Sung Swain.

 

VERSED            8/21/2017 1:35:59 PM      2 mg        Mandy Fenton

2 mg VERSED given in lab by Jossy, Mandy, RN in Left Forearm via Peripheral IV. Ordered by Sung Low.

 

1% XYLOCAINE         8/21/2017 1:42:50 PM     20 mL         Sung Low

20 mL 1% XYLOCAINE given in lab by Sung Low in Right Groin via Subcutaneous. Ordered by Sung Low.

 

FENTANYL           8/21/2017 1:44:44 PM     25 mcg        Mandy Fenton

25 mcg FENTANYL given in lab by Mandy Fenton RN in Left Forearm via Peripheral IV. Ordered by Sung Swain.

 

 

Initial Case Assessment

Cardiovascular

HR             Rhythm           NIBP            Chest Pain

 

88             sr             159/91           0

 

Edema Present       Skin color            Skin

 

None            Normal              Warm

 

Circulatory - Right Pulses

Femoral

 

1

 

Scale (0,1,2,3,4,d)

 

Circulatory - Left Pulses

Femoral

 

1

 

Scale (0,1,2,3,4,d)

 

Neurological State

              Oriented to time-place-

Alert                       Moves all extremities

              person

 

Respiration - General

Respiration Rate

              SpO2 (%)

(B/min)

13             99

Final Case Assessment

Cardiovascular

HR           Rhythm          NIBP          Chest Pain

 

85           sr            140/76         0

 

Edema Present      Skin color           Skin

 

None           Normal             Warm

 

Circulatory - Right Pulses

Femoral

 

1

 

Scale (0,1,2,3,4,d)

 

Circulatory - Left Pulses

Femoral

 

1

 

Scale (0,1,2,3,4,d)

 

Neurological State

            Oriented to time-place-

Alert                      Moves all extremities

            person

 

Respiration - General

Respiration Rate

            SpO2 (%)

(B/min)

15           96

 

Chronological Log

Time    Study Chronological Log

 

13:17:23  Patient arrived via Bed.

 

13:17:24  Patient Name, D.O.B, / Armband Verified By R.N.

 

13:17:26  Consent signed by the physician and the patient and verified by the Cath Lab staff.

 

13:17:30  Pre-op and post- op instructions given; patient acknowledges understanding of instructions.


 

13:17:34  Presedation assessment performed by Cath Lab RN.

 

13:17:54  Patient has been NPO for More than 6Hrs.

 

13:17:56  Skin Breakdown-

 

13:17:57  Patient Warmer Placed on the Table.

 

13:18:00  Geetha Prominences Protected

 

13:18:04  A # 22 IV was noted in the Forearm (left). Grade = 0

 

13:18:08  History and physical on the chart or being dictated.

      Assessment: Initial Case, HR=88 BPM, Rhythm=sr, JCPY=615/91 mmhg, Chest Pain=0, Edema=None, Col
or=Normal,

      Skin = Warm

      Right Pulses: Femoral=1

13:18:09

      Left Pulses: Femoral=1

      Neurological: State=Alert, Ox3, HAWK

      Respiration: Resp=13 B/min, SpO2=99 %

      Vitals capture started with the following parameters, Patient=Adult, Interval=5 min, Initial Pr
zoekky=349 mmHg,

13:29:12

      Deflation Rate=5 mmHg, Cuff placed on Right Arm

13:29:51  HR=88 bpm, SZKT=330/93 mmhg, SpO2=99.0 %, Resp=18 B/min, Pain=0, Etta=10, Rodriguez=2

13:34:24  MD arrived.

 

13:34:54  HR=87 bpm, MLZA=029/88 mmhg, SpO2=99.0 %, Resp=15 B/min, Pain=0, Etta=10, Rodriguez=2

 

13:35:57  50 mcg FENTANYL given in lab by Mandy Fenton, RN in Left Forearm via Peripheral IV. Orde
red by Sung Low.

 

13:35:59  2 mg VERSED given in lab by Mandy Fenton, RN in Left Forearm via Peripheral IV. Ordered 
by Sung Low.

      Time Out. Correct patient, correct procedure,correct physician, power injector not loaded with 
contrast with surgical

13:39:04

      team present. Time Out Concurred by MD, individual staff in procedure

13:39:53  HR=88 bpm, PYCY=107/82 mmhg, SpO2=98.0 %, Resp=17 B/min, Pain=0, Etta=10, Rodriguez=2

 

13:42:20  Pressure channel 1 zeroed.

 

13:42:27  Pressure channel 2 zeroed.

 

13:42:40  Case Start

 

13:42:50  20 mL 1% XYLOCAINE given in lab by Sung Low in Right Groin via Subcutaneous. Ordered 
by Sung Low.

 

13:44:44  25 mcg FENTANYL given in lab by Mandy Fenton, RN in Left Forearm via Peripheral IV. Orde
red by Sung Low.

 

13:44:50  HR=85 bpm, NDWD=492/77 mmhg, SpO2=97.0 %, Resp=12 B/min, Pain=0, Etta=10, Rodriguez=2

 

13:45:52  Access site was Right Femoral Vein.

 

13:46:43  A SHEATH, FR7 TERUMO (10CM) FR 7 was advanced into the Fem Vein (right) using the Percutane
ous technique.

 

13:49:53  HR=82 bpm, KZLI=364/71 mmhg, SpO2=93.0 %, Resp=20 B/min, Pain=0, Etta=10, Rodriguez=2

 

13:50:39  Access site was Right Femoral Artery.

 

13:50:48  A SHEATH, FR6 TERUMO (10CM) FR 6 was advanced into the Fem Art (right) using the Percutaneo
us technique.

 

13:51:39  A CATHETER, FR.7 BALLOON WEDGE PRESSURE FR 7 was inserted via Fem Vein (right)

      Recorded Pressure: RA, HR=82, Condition=Condition 1

13:52:10

      (Right Atrium) RA 8/7/6

      Recorded Pressure: Ao, RV, HR=83, Condition=Condition 1

13:52:26  (Aorta) Ao 119/119/119,

      (Right Ventricle) RV 34/7/7

      Recorded Pressure: MPA, HR=83, Condition=Condition 1

13:53:23

      (Main Pulmonary Artery) MPA 28/12/19

      Recorded Pressure: PCW, HR=84, Condition=Condition 1

13:54:18

      (Pulmonary Capillary Wedge) PCW 14/12/11

13:54:20  Saturation: Site=FA (Femoral Artery) , O2=88.4 %, Hgb=10.4 gm/dl, Condition=Condition 1. Us
ed in calculation.

 

13:54:52  Saturation: Site=PA (Pulmonary Artery) , O2=62.6 %, Hgb=10.4 gm/dl, Condition=Condition 1. 
Used in calculation.

 

13:55:25  HR=84 bpm, FWVQ=540/73 mmhg, SpO2=89.0 %, Resp=14 B/min, Pain=0, Etta=10, Rodriguez=2

      A AL 1 INFINITI CATHETER FR 6 was advanced over a wire. OMNIPAQUE, 350 MG, 150ML 150ML was used
 for

13:56:10

      injections.

13:58:40  A WIRE, STRT MOVEABLE CORE .035 * was inserted via Fem Art (right).

 

13:58:50  Wire removed

 

13:59:11  A WIRE, EXCHANGE 260CM 3MMJ 260CM was inserted via Fem Art (right).

      After removing the current catheter a PIGTAIL DUAL LUMEN CATHETER FR 6 was advanced over a WIRE
, EXCHANGE

13:59:23

      260CM 3MMJ 260CM.

13:59:51  HR=89 bpm, IJKC=925/68 mmhg, SpO2=92.0 %, Resp=16 B/min, Pain=0, Etta=10, Rodriguez=2

 

13:59:51  [place] Parth Catheter Removed

 

14:02:56  Pressure channel 1 zeroed.

      Recorded Pressure: LV, HR=87, Condition=Condition 1

14:03:35

      (Left Ventricle) /9/15

      Recorded Pressure: LV, Ao, HR=88, Condition=Condition 1

14:04:03  (Left Ventricle) /10/14,

      (Aorta) Ao 127/64/90

      Recorded Pressure: LV, Ao, Ao, HR=88, Condition=Condition 1

      (Left Ventricle) /11/15,

14:04:32

      (Aorta) Ao 143/70/99,

      (Aorta) Ao 135/68/95

14:04:50  HR=87 bpm, HYKU=865/76 mmhg, SpO2=92.0 %, Resp=18 B/min, Pain=0, Etta=10, Rodriguez=2

      After removing the current catheter a JL 4.0 INFINITI CATHETER FR 6 was advanced over a WIRE, 3
MMJ .035 180CM

14:08:44

      180CM.

14:09:06  The  LCA was injected and visualized at various angles. OMNIPAQUE, 350 MG, 150ML 150ML used
.

      After removing the current catheter a JR 4.0 INFINITI CATHETER FR 6 was advanced over a WIRE, 3
MMJ .035 180CM

14:09:44

      180CM.

14:09:51  HR=89 bpm, JBNS=042/72 mmhg, SpO2=93.0 %, Resp=16 B/min, Pain=0, Etta=10, Rodriguez=2

 

14:13:10  The  RCA was injected and visualized at various angles. OMNIPAQUE, 350 MG, 150ML 150ML used
.

 

14:13:37  Catheter was removed

 

14:13:42  Case End

 

14:14:52  HR=87 bpm, ZDTV=802/80 mmhg, SpO2=94.0 %, Resp=13 B/min, Pain=0, Etta=10, Rodriguez=2

 

14:19:51  HR=85 bpm, AZXW=138/70 mmhg, SpO2=93.0 %, Resp=12 B/min, Pain=0, Etta=10, Rodriguez=2

 

14:24:53  HR=84 bpm, DUZA=883/73 mmhg, SpO2=96.0 %, Resp=19 B/min, Pain=0, Etta=10, Rodriguez=2

 

14:29:52  HR=82 bpm, XXWY=211/76 mmhg, SpO2=94.0 %, Resp=15 B/min, Pain=0, Etta=10, Rodriguez=2

 

14:30:58  In the Fem Art (right) the sheath was sutured in place by Sung Low.

 

14:31:02  Sterile dressing applied to site

 

14:31:04  No case complications noted.

 

14:31:06  Cine recording checked.

 

14:31:09  Bedside Report will be given.

 

14:31:14  Contrast Scanned

 

14:31:21  A Left and Right Heart Cath was performed.

 

14:31:23  Patient moved to [facility]

      Assessment: Final Case, HR=85 BPM, Rhythm=sr, BMHH=779/76 mmhg, Chest Pain=0, Edema=None, Color
=Normal,

      Skin = Warm

      Right Pulses: Femoral=1

14:31:31

      Left Pulses: Femoral=1

      Neurological: State=Alert, Ox3, HAWK

      Respiration: Resp=15 B/min, SpO2=96 %

14:35:48  Vitals capture stopped.

 

 

 

 

End Study - Contrast Media Used In Study

Contrast       Total Opened (mL)     Total Used (mL)     Total Wasted (mL)

 

Omnipaque      30            30            0

 

End Study - Maximum Contrast Load

Max Contrast Load (mL)

 

404.5

End Study - Radiation Exposure

Fluoro Time

(minutes)

4.2

 

 

End Study - Patient Disposition

Complications  Transferred To  Interventional Outcome

 

No       Telemetry Bed   No attempt made

## 2017-08-21 NOTE — HHI.PR
Subjective


Remarks


no cp or sob.


nervous about her heart condition





Objective


Vitals


heart reg. jenni


lung good air entry


abd s/nt


ext no edema


Vital Signs








  Date Time  Temp Pulse Resp B/P (MAP) Pulse Ox O2 Delivery O2 Flow Rate FiO2


 


8/21/17 06:16  80      


 


8/21/17 05:09  88      


 


8/21/17 04:43  82      


 


8/21/17 04:17 97.8 90  130/77 (94) 96   


 


8/21/17 03:00  88      


 


8/21/17 02:00  84      


 


8/21/17 01:00  84      


 


8/21/17 00:00  88      


 


8/20/17 23:48 98.2 90  116/70 (85) 96   


 


8/20/17 23:34     98 Nasal Cannula 2.00 


 


8/20/17 23:00  89      


 


8/20/17 21:00  88      


 


8/20/17 20:00  100      


 


8/20/17 20:00 98.2 88  127/74 (91) 98   


 


8/20/17 19:00  88      


 


8/20/17 18:13  78      


 


8/20/17 17:00  90      


 


8/20/17 16:00  86      


 


8/20/17 15:30 98.0 88 16 117/70 (86) 96   


 


8/20/17 15:00  85      


 


8/20/17 14:00  90      


 


8/20/17 13:00  94      


 


8/20/17 12:00  80      


 


8/20/17 11:30 98.0 80 16 118/77 (91) 96   


 


8/20/17 11:00  81      


 


8/20/17 10:00  86      


 


8/20/17 09:00  84      








Result Diagram:  


8/21/17 0345                                                                   

             8/20/17 1632





Imaging





Last Impressions








Chest X-Ray 8/18/17 2319 Signed





Impressions: 





 Service Date/Time:  Friday, August 18, 2017 23:35 - CONCLUSION:  1. Mild 





 congestive heart failure.     Jermaine Riddle MD 











A/P


Problem List:  


(1) CHF (congestive heart failure)


ICD Codes:  I50.9 - Heart failure, unspecified


Status:  Acute


Plan:  - Pt admitted with SOB and felt to have acute chf symptoms related to 

severe AVS


- Echocardiogram (8/19/17) --> severe Aortic Valve Stenosis





she was diureses and looks compensated now


transferred to [facility] for C and consideration for AVR


Pt will require Aortic Valve repair


await cardiology decisions


scd's





(2) Aortic valve stenosis, severe


ICD Codes:  I35.0 - Nonrheumatic aortic (valve) stenosis


Status:  Acute


Plan:  - see above





(3) Anxiety


ICD Codes:  F41.9 - Anxiety disorder, unspecified


Status:  Chronic


Plan:  


- zoloft, xanax





(4) H/O: GI bleed


ICD Codes:  Z87.19 - Personal history of other diseases of the digestive system


Plan:  


- continued PPI


- observe








Problem Qualifiers





(1) CHF (congestive heart failure):  


Qualified Codes:  I50.9 - Heart failure, unspecified








Jaxson Rose MD Aug 21, 2017 08:06

## 2017-08-21 NOTE — PD.CARD.PN
Subjective


Subjective Remarks


denies chest pain or shortness of breath


 (Rajiv Maddox)





Objective


Vital Signs / I&O





Vital Signs








  Date Time  Temp Pulse Resp B/P (MAP) Pulse Ox O2 Delivery O2 Flow Rate FiO2


 


8/21/17 06:16  80      


 


8/21/17 05:09  88      


 


8/21/17 04:43  82      


 


8/21/17 04:17 97.8 90  130/77 (94) 96   


 


8/21/17 03:00  88      


 


8/21/17 02:00  84      


 


8/21/17 01:00  84      


 


8/21/17 00:00  88      


 


8/20/17 23:48 98.2 90  116/70 (85) 96   


 


8/20/17 23:34     98 Nasal Cannula 2.00 


 


8/20/17 23:00  89      


 


8/20/17 21:00  88      


 


8/20/17 20:00  100      


 


8/20/17 20:00 98.2 88  127/74 (91) 98   


 


8/20/17 19:00  88      


 


8/20/17 18:13  78      


 


8/20/17 17:00  90      


 


8/20/17 16:00  86      


 


8/20/17 15:30 98.0 88 16 117/70 (86) 96   


 


8/20/17 15:00  85      


 


8/20/17 14:00  90      


 


8/20/17 13:00  94      


 


8/20/17 12:00  80      


 


8/20/17 11:30 98.0 80 16 118/77 (91) 96   


 


8/20/17 11:00  81      


 


8/20/17 10:00  86      


 


8/20/17 09:00  84      


 


8/20/17 08:00  78      


 


8/20/17 07:55     97 Nasal Cannula 2.00 














I/O      


 


 8/20/17 8/20/17 8/20/17 8/21/17 8/21/17 8/21/17





 06:59 14:59 22:59 06:59 14:59 22:59


 


Intake Total 240 ml  720 ml 240 ml  


 


Output Total   400 ml 400 ml  


 


Balance 240 ml  320 ml -160 ml  


 


      


 


Intake Oral 240 ml  720 ml 240 ml  


 


Output Urine Total   400 ml 400 ml  


 


# Voids 2     








Physical Exam


GENERAL: Well-nourished, well-developed patient in no apparent distress.


NECK: No JVD. No carotid bruit.


CARDIOVASCULAR: Regular rate and rhythm.  S1/S2, II/VI CHANTELLE LSB, no rub or 

gallop 


RESPIRATORY: No accessory muscle use. Clear to auscultation. Breath sounds 

equal bilaterally. 


GASTROINTESTINAL: Abdomen soft, non-tender, nondistended. 


MUSCULOSKELETAL: Extremities without clubbing, cyanosis, or edema.


Laboratory





Laboratory Tests








Test


  8/20/17


16:32 8/21/17


03:45


 


White Blood Count 9.0 TH/MM3  6.8 TH/MM3 


 


Red Blood Count 4.24 MIL/MM3  4.09 MIL/MM3 


 


Hemoglobin 10.7 GM/DL  10.4 GM/DL 


 


Hematocrit 34.1 %  32.9 % 


 


Mean Corpuscular Volume 80.6 FL  80.4 FL 


 


Mean Corpuscular Hemoglobin 25.2 PG  25.6 PG 


 


Mean Corpuscular Hemoglobin


Concent 31.3 % 


  31.8 % 


 


 


Red Cell Distribution Width 19.5 %  19.9 % 


 


Platelet Count 330 TH/MM3  277 TH/MM3 


 


Mean Platelet Volume 8.1 FL  9.0 FL 


 


Neutrophils (%) (Auto) 61.3 %  51.4 % 


 


Lymphocytes (%) (Auto) 23.6 %  34.6 % 


 


Monocytes (%) (Auto) 9.8 %  9.4 % 


 


Eosinophils (%) (Auto) 4.5 %  4.4 % 


 


Basophils (%) (Auto) 0.8 %  0.2 % 


 


Neutrophils # (Auto) 5.5 TH/MM3  3.5 TH/MM3 


 


Lymphocytes # (Auto) 2.1 TH/MM3  2.3 TH/MM3 


 


Monocytes # (Auto) 0.9 TH/MM3  0.6 TH/MM3 


 


Eosinophils # (Auto) 0.4 TH/MM3  0.3 TH/MM3 


 


Basophils # (Auto) 0.1 TH/MM3  0.0 TH/MM3 


 


CBC Comment DIFF FINAL  DIFF FINAL 


 


Differential Comment    


 


Blood Urea Nitrogen 28 MG/DL  


 


Creatinine 0.96 MG/DL  


 


Random Glucose 88 MG/DL  


 


Calcium Level 8.5 MG/DL  


 


Sodium Level 137 MEQ/L  


 


Potassium Level 4.1 MEQ/L  


 


Chloride Level 99 MEQ/L  


 


Carbon Dioxide Level 29.2 MEQ/L  


 


Anion Gap 9 MEQ/L  


 


Estimat Glomerular Filtration


Rate 55 ML/MIN 


  


 


 


Magnesium Level  2.3 MG/DL 








 (Rajiv Maddox)





Assessment and Plan


Problem List:  


(1) NSTEMI (non-ST elevation myocardial infarction)


ICD Codes:  I21.4 - Non-ST elevation (NSTEMI) myocardial infarction


Status:  Acute


(2) Aortic valve stenosis, severe


ICD Codes:  I35.0 - Nonrheumatic aortic (valve) stenosis


Status:  Acute


(3) Hypertension


ICD Codes:  I10 - Essential (primary) hypertension


Status:  Chronic


(4) Hyperlipidemia


ICD Codes:  E78.5 - Hyperlipidemia, unspecified


Status:  Chronic


Assessment and Plan


NSTEMI/AS - Mean gradient 44 mmHg, schedule left heart cath this afternoon, 

further recommendations will depend on that outcome 


Her blood pressure is well controlled 





 (Rajiv Maddox)


Assessment and Plan


-----------------------------


newly diagnosed CHF with preserved EF secondary to valular heart disease and 

severe aortic stenosis


clinically improved now.


borderline elevated troponin may be demand mediated


recent anemia - iron def suggests GI bleeding.  Extensive workup negative.  

will need to determine if it is safe for heparin or antiplatelet therapy should 

we need it.  May need GI input/clearance.


severe aortic stenosis - discussed options of AVR vs TAVR.  will gather more 

information and consult CT surgery.


plan for [facility] today


 (Sung Low MD)











Rajiv Maddox Aug 21, 2017 07:57


Sung Low MD Aug 21, 2017 09:51

## 2017-08-21 NOTE — PD.CAR.PN
CVT Progress Note


Subjective/Hospital Course:


pt seen and evaluated , full note dictated


sts discussed with pt 








 RISK SCORES


 About the STS Risk Calculator


 Procedure: AV Replacement 


Risk of Mortality: 4.887% 


Morbidity or Mortality: 20.95% 


Long Length of Stay: 12.424% 


Short Length of Stay: 19.457% 


Permanent Stroke: 2.207% 


Prolonged Ventilation: 15.8% 


DSW Infection: 0.339% 


Renal Failure: 4.437% 


Reoperation: 8.196%


Objective:





Vital Signs








  Date Time  Temp Pulse Resp B/P (MAP) Pulse Ox O2 Delivery O2 Flow Rate FiO2


 


8/21/17 17:10  78      


 


8/21/17 16:24  83      


 


8/21/17 15:22  84      


 


8/21/17 15:22 97.9 81 18 134/85 (101) 99   


 


8/21/17 14:46   16     


 


8/21/17 12:46  84      


 


8/21/17 11:34  86      


 


8/21/17 11:32 97.9 81 18 134/85 (101) 99   


 


8/21/17 10:21  93      


 


8/21/17 09:21     96 Nasal Cannula 2.00 


 


8/21/17 09:00  87      


 


8/21/17 08:00  86      


 


8/21/17 07:30  81      


 


8/21/17 07:30 97.9 81 18 134/85 (101) 99   


 


8/21/17 06:16  80      


 


8/21/17 05:09  88      


 


8/21/17 04:43  82      


 


8/21/17 04:17 97.8 90  130/77 (94) 96   


 


8/21/17 03:00  88      


 


8/21/17 02:00  84      


 


8/21/17 01:00  84      


 


8/21/17 00:00  88      


 


8/20/17 23:48 98.2 90  116/70 (85) 96   


 


8/20/17 23:34     98 Nasal Cannula 2.00 


 


8/20/17 23:00  89      


 


8/20/17 21:00  88      


 


8/20/17 20:00  100      


 


8/20/17 20:00 98.2 88  127/74 (91) 98   


 


8/20/17 19:00  88      


 


8/20/17 18:13  78      








Result Diagram:  


8/21/17 0345                                                                   

             8/20/17 8775








(1) NSTEMI (non-ST elevation myocardial infarction)


(2) Aortic valve stenosis, severe


(3) Hypertension


(4) Hyperlipidemia











Terwilliger,Jacqueline R. ARNP Aug 21, 2017 17:27

## 2017-08-21 NOTE — MA
cc:

NICAVERONICA

****

 

 

DATE:  08/21/2017

 

INDICATION

Aortic stenosis.

 

PROCEDURE PERFORMED

 

1. Fluoroscopy with interpretation.

2. Left heart catheterization.

3. Coronary angiography.

4. Right heart catheterization.

 

METHOD

Risks, benefits, and alternatives were discussed with the patient.  The patient

understood and consented to the procedure.  The patient was brought to the

catheterization lab and placed on the catheterization table.  Right groin was

prepped and draped in sterile fashion.  Right groin was anesthetized with 2%

lidocaine.

The right common femoral artery was cannulated and a 6-Russian 11 cm sheath was

placed without difficulty.  Right femoral vein was accessed and a 7-Russian

sheath was placed without difficulty.

 

RIGHT HEART CATHETERIZATION

A 7-Russian [place]Parth pulmonary arterial catheter was advanced to the right 
femoral

venous sheath to the level of the right atrium under fluoroscopic guidance.

Hemodynamics were performed in all chambers while advancing to the pulmonary

capillary wedge position.

 

HEMODYNAMIC RESULTS

 

1. Right atrial pressure measured 8 mmHg.

2. Right ventricular pressure measured 34/7 mmHg.

3. Pulmonary arterial pressure measured 28/12 mmHg.

4. Pulmonary capillary wedge pressure 14 mmHg.

5. Cardiac output 6.4 liters per minute.

6. Cardiac index 3.4 liters per minute per meter squared.

 

LEFT HEART CATHETERIZATION

6-Russian AL-1 catheter was advanced to ascending aorta, straight tipped wire

was used to navigate across the aortic valve, AL-1 was advanced into the left

ventricle and exchanged and J-wire used to exchanged for 6-Russian length and

dual-lumen pigtail catheter.

 

Left ventricular pressures measured at 186/11 mmHg, left anterior end-diastolic

pressure of 15 mmHg.

 

Simultaneous left ventricular and aortic pressure waveforms were analyzed.

There was a mean gradient of 52 mmHg with a calculated aortic valve area at 0.7

cm2.

 

CORONARY ANGIOGRAPHY

 

1. Left main coronary is angiographically normal.

2. Left anterior descending coronary has mild luminal irregularities throughout

     its proximal course. Diagonal branch has minor luminal irregularities.

3. Left circumflex is tortuous, has a 30% ostial stenosis and otherwise minor

     luminal irregularities.

4. Right coronary is a dominant vessel giving rise to posterior descending

     branch.  Right coronary has minor luminal irregularities.

 

CONCLUSION

 

1. Critical aortic stenosis.

2. Mild nonobstructive coronary disease.

3. Normal left and right-sided filling pressures.

4. Normal pulmonary pressures.

5. Normal cardiac output and index.

 

PLAN

Will further discuss options regarding her severe aortic stenosis which

includes surgical aortic valve replacement versus percutaneous transcatheter

aortic valve replacement.

 

 

                              _________________________________

                              MD LARRY Agrawal/MARCIA

D:  8/21/2017/2:23 PM

T:  8/21/2017/2:39 PM

Visit #:  [id number]

Job #:  [id number]

CASIMIRO

## 2017-08-21 NOTE — RADRPT
EXAM DATE/TIME:  08/21/2017 18:11 

 

[facility] COMPARISON:     

No previous studies available for comparison.

        

 

 

INDICATIONS :     

Pre-op transcatheter aortic valve replacement/ aortic valve replacement. 

                     

 

MEDICAL HISTORY :     

Congestive heart failure. Hypercholesterolemia. Gastroesophageal reflux disease. Arthritis. Depressio
n. Anxiety. Anemia. 

 

SURGICAL HISTORY :     

Hysterectomy.     Bilateral cataract surgery. Bilateral knee replacement. 

 

ENCOUNTER:     

Initial

 

ACUITY:     

2 days

 

PAIN SCORE:     

3/10

 

LOCATION:     

Bilateral neck 

                     

PEAK SYSTOLIC VELOCITIES (cm/sec):

 

ICA/CCA RATIO:                    

Right: 2.1     Left: 1.7

 

ICA:                          

Right: 111.3     Left: 112.2

 

CCA:                          

Right: 52.9     Left: 66.8

 

ECA:                           

Right: 56.8     Left: 58.0

 

VERTEBRAL:           

Right: 88.7 antegrade     Left: 66.8 antegrade

             

Elevated flow velocities and ICA/CCA ratios have been found to correlate with increased degrees of

vessel stenosis, calculated as percentage of diameter relative to a normal segment of distal ICA/CCA

 

FINDINGS:

 

Mild to moderate calcified and noncalcified plaque is identified in the carotid bifurcations.

Proximal internal carotid arteries demonstrates significant tortuosity.

     

RIGHT CAROTID:     

No significant stenosis is visualized.  The waveforms are within normal limits.

 

LEFT CAROTID:     

No significant stenosis is visualized.  The waveforms are within normal limits.

 

VERTEBRAL ARTERIES:  

Antegrade flow is seen in both vertebral arteries.

 

MISCELLANEOUS:     

None.

 

CONCLUSION:     

Mild to moderate bilateral calcified plaque.

 

Proximal ICA tortuosity causing mild ICA/CCA elevation.

 

No evidence of hemodynamically significant stenosis.

 

Antegrade flow both vertebral arteries.

 

 

 

 Dav Monaco MD on August 21, 2017 at 19:10           

Board Certified Radiologist.

 This report was verified electronically.

## 2017-08-22 VITALS
RESPIRATION RATE: 16 BRPM | DIASTOLIC BLOOD PRESSURE: 84 MMHG | SYSTOLIC BLOOD PRESSURE: 147 MMHG | HEART RATE: 86 BPM | TEMPERATURE: 97.9 F | OXYGEN SATURATION: 100 %

## 2017-08-22 VITALS — HEART RATE: 90 BPM

## 2017-08-22 VITALS — OXYGEN SATURATION: 97 %

## 2017-08-22 VITALS — HEART RATE: 82 BPM

## 2017-08-22 VITALS
TEMPERATURE: 98.5 F | OXYGEN SATURATION: 94 % | DIASTOLIC BLOOD PRESSURE: 80 MMHG | RESPIRATION RATE: 16 BRPM | HEART RATE: 95 BPM | SYSTOLIC BLOOD PRESSURE: 125 MMHG

## 2017-08-22 VITALS
SYSTOLIC BLOOD PRESSURE: 107 MMHG | DIASTOLIC BLOOD PRESSURE: 62 MMHG | RESPIRATION RATE: 16 BRPM | HEART RATE: 90 BPM | TEMPERATURE: 97.5 F | OXYGEN SATURATION: 94 %

## 2017-08-22 VITALS — HEART RATE: 85 BPM

## 2017-08-22 VITALS — HEART RATE: 81 BPM

## 2017-08-22 VITALS — HEART RATE: 94 BPM

## 2017-08-22 VITALS
TEMPERATURE: 97.2 F | HEART RATE: 95 BPM | RESPIRATION RATE: 16 BRPM | OXYGEN SATURATION: 98 % | DIASTOLIC BLOOD PRESSURE: 89 MMHG | SYSTOLIC BLOOD PRESSURE: 150 MMHG

## 2017-08-22 VITALS — HEART RATE: 88 BPM

## 2017-08-22 LAB
ANION GAP SERPL CALC-SCNC: 7 MEQ/L (ref 5–15)
BASOPHILS # BLD AUTO: 0 TH/MM3 (ref 0–0.2)
BASOPHILS NFR BLD: 0.4 % (ref 0–2)
BUN SERPL-MCNC: 20 MG/DL (ref 7–18)
CHLORIDE SERPL-SCNC: 99 MEQ/L (ref 98–107)
EOSINOPHIL # BLD: 0.2 TH/MM3 (ref 0–0.4)
EOSINOPHIL NFR BLD: 4.1 % (ref 0–4)
ERYTHROCYTE [DISTWIDTH] IN BLOOD BY AUTOMATED COUNT: 19.8 % (ref 11.6–17.2)
GFR SERPLBLD BASED ON 1.73 SQ M-ARVRAT: 71 ML/MIN (ref 89–?)
HCO3 BLD-SCNC: 28.4 MEQ/L (ref 21–32)
HCT VFR BLD CALC: 31.7 % (ref 35–46)
HEMO FLAGS: (no result)
LYMPHOCYTES # BLD AUTO: 1.1 TH/MM3 (ref 1–4.8)
LYMPHOCYTES NFR BLD AUTO: 19.4 % (ref 9–44)
MCH RBC QN AUTO: 25.5 PG (ref 27–34)
MCHC RBC AUTO-ENTMCNC: 31.4 % (ref 32–36)
MCV RBC AUTO: 81.2 FL (ref 80–100)
MONOCYTES NFR BLD: 9 % (ref 0–8)
NEUTROPHILS # BLD AUTO: 3.9 TH/MM3 (ref 1.8–7.7)
NEUTROPHILS NFR BLD AUTO: 67.1 % (ref 16–70)
PLATELET # BLD: 245 TH/MM3 (ref 150–450)
POTASSIUM SERPL-SCNC: 3.7 MEQ/L (ref 3.5–5.1)
RBC # BLD AUTO: 3.91 MIL/MM3 (ref 4–5.3)
SODIUM SERPL-SCNC: 134 MEQ/L (ref 136–145)
WBC # BLD AUTO: 5.9 TH/MM3 (ref 4–11)

## 2017-08-22 RX ADMIN — SERTRALINE HYDROCHLORIDE SCH MG: 50 TABLET, FILM COATED ORAL at 09:56

## 2017-08-22 RX ADMIN — FUROSEMIDE SCH MG: 20 TABLET ORAL at 10:00

## 2017-08-22 RX ADMIN — POTASSIUM CHLORIDE SCH MEQ: 20 TABLET, EXTENDED RELEASE ORAL at 09:59

## 2017-08-22 RX ADMIN — Medication SCH TAB: at 09:59

## 2017-08-22 RX ADMIN — PANTOPRAZOLE SODIUM SCH MG: 20 TABLET, DELAYED RELEASE ORAL at 09:56

## 2017-08-22 RX ADMIN — Medication SCH ML: at 10:00

## 2017-08-22 RX ADMIN — PHENYTOIN SODIUM SCH MLS/HR: 50 INJECTION INTRAMUSCULAR; INTRAVENOUS at 08:00

## 2017-08-22 RX ADMIN — CETIRIZINE HYDROCHLORIDE SCH MG: 10 TABLET, FILM COATED ORAL at 09:56

## 2017-08-22 NOTE — HHI.DS
Discharge Summary


Admission Date


Aug 20, 2017 at 15:21


Discharge Date:  Aug 22, 2017


Admitting Diagnosis





congestive heart failure with pulmonary edema





(1) CHF (congestive heart failure)


Diagnosis:  Principal


ICD Codes:  I50.9 - Heart failure, unspecified


Status:  Acute


(2) Aortic valve stenosis, severe


Diagnosis:  Principal


ICD Codes:  I35.0 - Nonrheumatic aortic (valve) stenosis


Status:  Acute


(3) Anxiety


Diagnosis:  Secondary


ICD Codes:  F41.9 - Anxiety disorder, unspecified


Status:  Chronic


(4) H/O: GI bleed


Diagnosis:  Secondary


ICD Codes:  Z87.19 - Personal history of other diseases of the digestive system


CBC/BMP:  


8/21/17 0345                                                                   

             8/20/17 1632





Significant Findings





Laboratory Tests








Test


  8/19/17


13:30 8/20/17


16:32 8/21/17


03:45 8/21/17


21:45


 


Blood Urea Nitrogen


  21 MG/DL


(7-18) 28 MG/DL


(7-18) 


  


 


 


Calcium Level


  8.4 MG/DL


(8.5-10.1) 


  


  


 


 


Potassium Level


  3.4 MEQ/L


(3.5-5.1) 


  


  


 


 


Estimat Glomerular Filtration


Rate 62 ML/MIN


(>89) 55 ML/MIN


(>89) 


  


 


 


Hemoglobin


  


  10.7 GM/DL


(11.6-15.3) 10.4 GM/DL


(11.6-15.3) 


 


 


Hematocrit


  


  34.1 %


(35.0-46.0) 32.9 %


(35.0-46.0) 


 


 


Mean Corpuscular Hemoglobin


  


  25.2 PG


(27.0-34.0) 25.6 PG


(27.0-34.0) 


 


 


Mean Corpuscular Hemoglobin


Concent 


  31.3 %


(32.0-36.0) 31.8 %


(32.0-36.0) 


 


 


Red Cell Distribution Width


  


  19.5 %


(11.6-17.2) 19.9 %


(11.6-17.2) 


 


 


Monocytes (%) (Auto)


  


  9.8 %


(0.0-8.0) 9.4 %


(0.0-8.0) 


 


 


Eosinophils (%) (Auto)


  


  4.5 %


(0.0-4.0) 4.4 %


(0.0-4.0) 


 








Hospital Course


(1) CHF (congestive heart failure)


 - Pt admitted with SOB and felt to have acute chf symptoms related to severe 

AVS


- Echocardiogram (8/19/17) --> severe Aortic Valve Stenosis


-she was diureses and looks compensated now


-transferred to [facility] for LHC and consideration for AVR


-LHC on 8/21 showed critical AS but nonobstructive cad


-She was seen by CTS who did the initial evaluation for TAVR vs traditional AVR.


-Pt will be sent home on ace/diuretic....asa/plavix and monitored for bleeding 

as


  she has a hx of gib in the past. AVR will be scheduled for later date. f/u 1 

week


  with cardiology and should have a bmp at that time.





(2) Aortic valve stenosis, severe


see above





(3) Anxiety


- zoloft, xanax





(4) H/O: GI bleed


- continued PPI


Pt Condition on Discharge:  Stable


Discharge Disposition:  Discharge Home


Discharge Instructions


DIET: Follow Instructions for:  Heart Healthy Diet


Activities you can perform:  Regular-No Restrictions


Follow up Referrals:  


Cardiology - 1 Week with dr tarik tavarez


PCP Follow-up - 2 Weeks with dr gabriela recinos





New Medications:  


Aspirin  (Adult Aspirin EC Low Strength) 81 Mg Tabec


81 MG PO DAILY for heart disease for 90 Days, TAB





Clopidogrel (Plavix) 75 Mg Tab


75 MG PO DAILY for heart disease, #30 TAB 3 Refills





Furosemide (Furosemide) 20 Mg Tab


20 MG PO DAILY for heart disease, #30 TAB 3 Refills





Lisinopril (Lisinopril) 5 Mg Tab


2.5 MG PO DAILY for heart disease, #30 TAB 3 Refills





Potassium Chloride Microencaps (Potassium Chloride Microencaps) 20 Meq Tab


20 MEQ PO DAILY for supplement, #30 TAB 3 Refills





 


Continued Medications:  


Alprazolam (Xanax) 0.25 Mg Tab


0.25 MG PO AS DIRECTED PRN for ANXIETY, TAB 0 Refills





Atorvastatin (Lipitor) 10 Mg Tab


10 MG PO HS for Cholesterol Management, #30 TAB 0 Refills





Cetirizine (Cetirizine) 10 Mg Tab


10 MG PO DAILY for Allergies, TAB 0 Refills





Multiple Vitamins W/ Minerals (Ocuvite) 1 Tab


1 TAB PO DAILY for Nutritional Supplement, TAB 0 Refills





Omeprazole (Omeprazole) 20 Mg Tab


20 MG PO DAILY, #30 TAB 0 Refills





Polysaccharide Iron Complex (Poly-Iron 150) 150 Mg Cap


150 MG PO ONCE for anemia, #30 CAP





Sertraline (Zoloft) 50 Mg Tab


150 MG PO DAILY for anxiety, #30 TAB

















Jaxson Rose MD Aug 22, 2017 09:29

## 2017-08-22 NOTE — MB
cc:

MINOR,SUNG

BRATU,TRINA S. M.D.

LEO-CYRUS,REI

REBECCA,MARCELL IBARRA MD

****

 

 

DATE OF CONSULTATION

8/22/2017

 

REFERRING PHYSICIAN

Dr. Sung Low

 

REASON FOR CONSULTATION

Surgical opinion regarding aortic valve.

 

HISTORY

Ms. Dillon is a very pleasant 84-year-old lady with a known history of

significant anemia and GI bleed who has undergone an extensive 

workup including a colonoscopy, upper endoscopy, as well as pill

endoscopy with no identifiable primary source of bleeding, who was admitted with

anemia, shortness of breath and a cough with a hemoglobin of 4.  She has

received a six unit transfusion with resultant rise in her

hemoglobin.  Further workup including an echocardiogram, has revealed

 severe aortic stenosis with preserved ventricular function.  Her

peak aortic valve gradient is 76 with a mean gradient of 44 and an aortic peak

velocity of 4.4 mm/sec squared. She has been seen by Dr. Singletary for surgical

evaluation of aortic valve and has been given the option of open surgical

repair versus TAVR.  I am now seeing her for further discussion regarding the

above findings.

 

At the present time, she does have significant exertional dyspnea, as well

as, her underlying pulmonary edema with congestive heart failure which resulted

in her current admission.  She also has the above-mentioned anemia which is

stable for the current time being.

 

PAST MEDICAL HISTORY

Significant for:

1.   Significant GI bleed as described above.

2. Iron-deficiency anemia.

3. Valdez's esophagus

4. Diverticulosis

5. Hypertension

6. Hyperlipidemia

7. Macular degeneration

8. Major depressive disorder

9. Anxiety

10. Colonic polyps

11. Aortic stenosis as described above.

 

PAST SURGICAL HISTORY

Remarkable for:

1.   Total abdominal hysterectomy and bilateral

    salpingo-oophorectomy.

2. Bilateral total knee arthroplasty

3. Cataract surgeries

 

ALLERGIES

The patient reports allergies to MORPHINE.

 

MEDICATIONS ON ADMISSION

Include:

1. Cetirizine

2. Iron

3. Clopidogrel

4. Atorvastatin

5. Lisinopril

6. Aspirin

7. Sertraline

8. Alprazolam

9. Potassium

10. Lasix

11. Omeprazole

12. Multivitamins

 

REVIEW OF SYSTEMS

The review of systems is as above.  All other parameters are negative.

 

PHYSICAL EXAM

Today she is 165 cm tall and weighs 72 kg, blood pressure 147/84 with a heart

rate 86 and regular, respiratory 18 and she is afebrile.

HEENT:  Normocephalic, atraumatic.  Pupils round and reactive.  Extraocular

muscles intact.

NECK:  No cervical lymphadenopathy, carotid bruits or JVD.

CARDIOVASCULAR:  Regular rate with normal S1 and S2 without gallops or rubs.

There is a 4/6 systolic ejection murmur at the right parasternal border.

LUNGS:  Relatively clear to auscultation with some basilar crackles improving

with coughing.

ABDOMEN:  Obese, otherwise soft, nontender, and nondistended with normoactive

bowel sounds.  No hepatosplenomegaly.

EXTREMITIES:  Bilateral femoral pulses are intact with no palpable pedal

pulses.  No cyanosis, clubbing or edema.  No venous varicosities.

NEUROLOGIC:  Intact with no focal deficit.

 

IMPRESSION

1. Severe aortic stenosis.

2. GI bleed

3. Congestive heart failure with pulmonary edema

4. Hypertension

5. Hyperlipidemia

6. Valdez's esophagus

7. Diverticulosis

8. Depression/anxiety

 

PLAN

The clinical and echo findings were reviewed and discussed with the patient.  I

agree with her provided physicians that she will maximally benefit from

aortic valve replacement therapy.  Given her significant medical comorbidities,

advanced age, as well as her frailty, I think the best option in her case will

be TAVR consideration.  I think open surgical risk will carry a prohibitive

risk in her case because of the above findings.

 

Thank you for allowing me to participate in the care of this patient.

 

 

 

                              _________________________________

                              Marcell ADAMS

D:  8/22/2017/10:47 AM

T:  8/22/2017/11:07 AM

Visit #:  [id number]

Job #:  [id number]

CASIMIRO

## 2017-08-22 NOTE — RADRPT
EXAM DATE/TIME:  08/22/2017 10:53 

 

[facility] COMPARISON:     

No previous studies available for comparison.

 

 

INDICATIONS :     

Congestive heart failure.  TAVR evaluation.

                      

 

IV CONTRAST:     

100 cc Omnipaque 350 (iohexol) IV 

 

 

RADIATION DOSE:     

11.94 CTDIvol (mGy) 

 

 

MEDICAL HISTORY :     

Congestive heart failure. Hypertension. Gastroesophageal reflux disease.

 

SURGICAL HISTORY :      

Hysterectomy. 

 

ENCOUNTER:      

Initial

 

ACUITY:      

1 day

 

PAIN SCALE:      

0/10

 

LOCATION:        

chest 

 

TECHNIQUE:     

Volumetric scanning was performed using a multi-row detector CT scanner.  The data was post processed
 with a variety of visualization algorithms including full volume maximum intensity projection, multi
-planar sliding thin slab reformation, curved planar reformation, and surface rendering techniques.  
Using automated exposure control and adjustment of the mA and/or kV according to patient size, radiat
ion dose was kept as low as reasonably achievable to obtain optimal diagnostic quality images.    DIC
OM format image data is available electronically for review and comparison.  

 

 

FINDINGS:     

 

CARDIAC:     

The coronary system is right dominant.  Scattered coronary artery atherosclerotic calcifications. The
re is no pericardial effusion

 

AORTIC ROOT/VALVE:     

3 cusps are evident with calcifications.   

     The sinus of Valsalva measures 2.9 cm in diameter. The sinotubular junction 2.7 cm in diameter.

     Mid thoracic aorta measures 3.4 cm with no calcifications.

 

THORACIC AORTA:     

Origin of the great vessels is normal.  No evidence of aneurysm, mural thrombus, dissection, mural ca
lcification, or stenosis.  

     

 

ABDOMINAL AORTA:     

No evidence of aneurysm, mural thrombus, dissection, mural calcification, or stenosis.  

 

CELIAC ARTERY:     

Celiac artery is widely patent.

 

SMA:     

Superior mesenteric artery is widely patent.

 

RIGHT RENAL ARTERY: 

Calcified plaque involving the origin without hemodynamically significant stenosis..

 

LEFT RENAL ARTERY:     

Calcified plaque involving the origin without hemodynamically significant stenosis..

 

RIGHT COMMON ILIAC: 

No evidence of aneurysm, mural thrombus, dissection, mural calcification, or stenosis.  The common fe
moral measures 5 mm at its narrowest point due to an asymmetric calcified plaque.

 

LEFT COMMON ILIAC:     

No evidence of aneurysm, mural thrombus, dissection, mural calcification, or stenosis. An ulcerated p
laque is seen involving the distal left common iliac artery. This does not generate a significant [place]
nosis.  The common femoral measures 5 mm at its narrowest point due to an asymmetric calcified plaque
.

 

THORAX:     

Note is made of an azygos lobe. A small right pleural effusion posteriorly layering with adjacent dep
endent atelectasis. Two 6 mm smoothly marginated pulmonary nodules within the right middle lobe.

 

ABDOMEN:          

A 2 cm cyst is seen involving the left lobe of the liver. Multiple calcified gallstones within an oth
erwise normal-appearing gallbladder. A solitary cyst exophytic from the right kidney measuring 6.6 cm
. Colonic diverticulosis. No acute inflammatory change observed. Stranding involving the subcutaneous
 fat of the right groin.

 

PELVIS:     

Stranding of the subcutaneous fat involving the right groin. Uterus is surgically absent. A degenerat
tyrone and scoliotic lumbar spine.

 

CONCLUSION:     

1. Measurements as given above.

2. Two 6 mm nodules within the right middle lobe. Current Fleischer guidelines suggest a repeat CT of
 the thorax in 6-12 months.

3. Ulcerated plaque involving the left common iliac artery without significant luminal narrowing.

4. Small right effusion with associated atelectasis.

5. Cholelithiasis.

6. Hepatic and right renal cysts.

 

 

 

 Theodore Duran Jr., MD on August 22, 2017 at 15:07           

Board Certified Radiologist.

 This report was verified electronically.

## 2017-08-22 NOTE — PD.CARD.PN
Subjective


Subjective Remarks


no events overnight





Objective


Medications


GENERAL: 


SKIN: Warm and dry.


HEAD: Normocephalic.


EYES: No scleral icterus. No injection or drainage. 


NECK: Supple, trachea midline. No JVD or lymphadenopathy.


CARDIOVASCULAR: Regular rate and rhythm without murmurs, gallops, or rubs. 


RESPIRATORY: Breath sounds equal bilaterally. No accessory muscle use.


GASTROINTESTINAL: Abdomen soft, non-tender, nondistended. 


MUSCULOSKELETAL: No cyanosis, or edema. 


BACK: Nontender without obvious deformity. No CVA tenderness.





Vital Signs / I&O





Vital Signs








  Date Time  Temp Pulse Resp B/P (MAP) Pulse Ox O2 Delivery O2 Flow Rate FiO2


 


8/22/17 06:00  81      


 


8/22/17 05:00  85      


 


8/22/17 04:00 97.2 95 16 150/89 (109) 98   


 


8/22/17 04:00  95      


 


8/22/17 03:00  85      


 


8/22/17 02:00  85      


 


8/22/17 01:00  88      


 


8/22/17 00:00  95      


 


8/22/17 00:00 98.5 95 16 125/80 (95) 94   


 


8/21/17 23:00  89      


 


8/21/17 22:00  84      


 


8/21/17 21:00  86      


 


8/21/17 20:00 98.0 79 16 119/77 (91) 97   


 


8/21/17 20:00  90      


 


8/21/17 19:12     98   21


 


8/21/17 19:00  86      


 


8/21/17 18:27  93      


 


8/21/17 17:10  78      


 


8/21/17 17:00  80      


 


8/21/17 16:24  83      


 


8/21/17 15:22  84      


 


8/21/17 15:22 97.9 81 18 134/85 (101) 99   


 


8/21/17 14:46   16     


 


8/21/17 12:46  84      


 


8/21/17 11:34  86      


 


8/21/17 11:32 97.9 81 18 134/85 (101) 99   


 


8/21/17 10:21  93      


 


8/21/17 09:21     96 Nasal Cannula 2.00 


 


8/21/17 09:00  87      














I/O      


 


 8/21/17 8/21/17 8/21/17 8/22/17 8/22/17 8/22/17





 06:59 14:59 22:59 06:59 14:59 22:59


 


Intake Total 240 ml  450 ml 480 ml  


 


Output Total 400 ml  800 ml 700 ml  


 


Balance -160 ml  -350 ml -220 ml  


 


      


 


Intake Oral 240 ml  240 ml 480 ml  


 


IV Total   210 ml   


 


Output Urine Total 400 ml  800 ml 700 ml  


 


# Voids   2   


 


# Bowel Movements    0  








Physical Exam


GENERAL: 


SKIN: Warm and dry.


HEAD: Normocephalic.


EYES: No scleral icterus. No injection or drainage. 


NECK: Supple, trachea midline. No JVD or lymphadenopathy.


CARDIOVASCULAR: Regular rate and rhythm 3/6 SM


RESPIRATORY: Breath sounds equal bilaterally. No accessory muscle use.


GASTROINTESTINAL: Abdomen soft, non-tender, nondistended. 


MUSCULOSKELETAL: No cyanosis, or edema. 


BACK: Nontender without obvious deformity. No CVA tenderness.





Laboratory





Laboratory Tests








Test


  8/21/17


21:45


 


Nasal Screen MRSA (PCR)


  MRSA NOT


DETECTED








Imaging





Last Impressions








Chest X-Ray 8/18/17 [id number] Signed





Impressions: 





 Service Date/Time:  Friday, August 18, 2017 23:35 - CONCLUSION:  1. Mild 





 congestive heart failure.     Jermaine Riddle MD 











Assessment and Plan


Problem List:  


(1) NSTEMI (non-ST elevation myocardial infarction)


ICD Codes:  I21.4 - Non-ST elevation (NSTEMI) myocardial infarction


Status:  Acute


(2) Aortic valve stenosis, severe


ICD Codes:  I35.0 - Nonrheumatic aortic (valve) stenosis


Status:  Acute


(3) Hypertension


ICD Codes:  I10 - Essential (primary) hypertension


Status:  Chronic


(4) Hyperlipidemia


ICD Codes:  E78.5 - Hyperlipidemia, unspecified


Status:  Chronic


Assessment and Plan


-----------------------------


CHF - clinically compensated.  low dose lasix. monitor salt and weight


NSTEMI - demand mediated. mild CAD.


severe AS - TAVR workup in progress.  tentatively planned for Sept.


anemia - no clear source.  will need asa plavix post TAVR x 6 months.  will 

need trial of asa plavix now to determine if further bleeding and GI FU.  FU 

CBC in 7 days.  monitor closely for bleeding.


DC home today


FU in office in 2 weeks











Sung Low MD Aug 22, 2017 08:37

## 2017-08-22 NOTE — MB
cc:

NICOLASA PRETTY

****

 

DATE OF CONSULTATION

17

 

HISTORY OF PRESENT ILLNESS

An 84-year-old patient of Dr. Gamez, Dr. Low, also Dr. Umanzor who over

the last 3-month period she has had three admissions for GI bleed, has had a

complete workup to include colonoscopy, endoscopy, pill endoscopy.  She has

received multiple transfusions in the past. She even had an episode where she

got some profound anemia with hemoglobin of 4. A consult to cardiology at that

time. Echo was done back in May on her admission which showed an EF of 55%.

The aortic valve area 0.61, a mean gradient of 64 mmHg, consistent with severe

aortic valve stenosis.  On this admission she presented to her primary care

office with a congested cough. They wanted to do an x-ray and they finally were

able to be complete which showed some pulmonary edema. They wanted to start

some Lasix and repeat the echocardiogram, however, she was not able to obtain

the Lasix and that night developed worsening cough and shortness of breath so

she became very anxious and panicked which brought her into the emergency room.

O2 sat was 90%, blood pressure was elevated at 208/112, respiratory rate of 30

and pulse heart rate of 112.  The x-ray in the ED also showed mild pulmonary

edema also with some enlarged heart and cardiomegaly.  Initial troponin was

0.06.  She was admitted for diuresis and evaluation of her cardiac status.  She

underwent cardiac cath by Dr. Low today. She had a right and left heart cath.

Right sided heart pressures did show an RV pressure of 34/7. Pulmonary artery

pressure of 28/12 with a wedge pressure 14. Cardiac output of 6.4 with an index

of 3.4. There was aortic pressure waveforms showing a mean gradient of 52 mmHg

with a valve area 0.7. Coronary angiography showed nonobstructive coronary

disease with a 30% ostial stenosis in the left circ. Repeated echocardiogram

showed an EF of 55-60%, some mild concentric LVH.  Aortic valve mean gradient

of 44 with some mild aortic insufficiency, mild to moderate MR, mild tricuspid

valve regurgitation and some mild pulmonary hypertension with PA pressures of

40-50. The aortic peak velocity of 438 cm2.  Aortic valve peak gradient of 76,

the mean gradient of 44. This was calculated with an aortic valve area of 0.65

cm squared.  We were consulted to evaluate for aortic valve replacement versus

criteria for transcatheter aortic valve replacement.

 

PAST MEDICAL HISTORY

The patient's past medical history of history of GI bleed, iron deficiency

anemia, Valdez's esophagus, colon polyps, hypertension, hyperlipidemia.  She

has a history of macular degeneration, major depressive disorder with

significant anxiety. She is also under the care of a psychiatrist.  She is

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 She has no longer been driving due to her

partial blindness.

 

PAST SURGICAL HISTORY

Her surgeries include total abdominal hysterectomy with bilateral

salpingo-oophorectomy, bilateral total knee arthroplasty, cataract surgeries.

Her left pupil is irregular versus the right.

 

FAMILY HISTORY

Mother  at 65 of an MI.  Father  from sudden death, cardiac

76.

 

SOCIAL HISTORY

The patient lives alone. She has two children.  She is , retired from

[employer].  He smoked for approximately 30 years.  She quit in .

Again, she lives alone.

 

REVIEW OF SYSTEMS

GENERAL: No night sweats, fever, heat or cold intolerance.

SKIN: No psoriasis, itching, hives.

HEENT:  No blurred vision, however, she is partially blind in the left eye.

RESPIRATORY:  Positive for occasional cough, shortness of breath.

CARDIOVASCULAR:  No chest pain. Np  paroxysmal nocturnal dyspnea. No syncope.

No lower extremity edema.

GASTROINTESTINAL:  She has occasional diarrhea.  No constipation.

GENITOURINARY:  No burning, frequency, urgency.

CNS: No history of TIA, CVA, seizure disorder.

ENDOCRINOLOGY: No history of hypothyroidism and diabetes.

 

PHYSICAL EXAMINATION

VITAL SIGNS: Blood pressure 134/85, heart rate 80, temperature max 97.9.

GENERAL: Patient is awake, alert, no acute distress.

HEENT: Head is normocephalic, atraumatic.  Pupils, the left is approximately 3

to 4 mm irregular, reactive to light, is approximately a 2 to 3 midline

reactive. Oral mucosa pink, moist. She has ___ dental caries.  No injection, no

erythema.

NECK:  Supple.  No JVD.

HEART:  Sounds S1-S2. Regular rate and rhythm with a grade 3/6 systolic murmur.

 

LUNGS:  Clear to auscultation. No wheezes, rales or rhonchi.

ABDOMEN: Obese, soft, nontender, no masses or organomegaly.  EXTREMITIES:

Reveal no cyanosis, clubbing or edema.

 

LABORATORY FINDINGS

Shows hemoglobin of 10, hematocrit of 33, white cell count of 6.8, platelet

count of 277, sodium 137, potassium 4.1, BUN 28, creatinine 0.96, magnesium

level 2.3. Troponin 0.06. INR 0.9.  Urinalysis is unremarkable.

 

IMAGING STUDIES

Chest x-ray with mild CHF.

 

IMPRESSION

This is an 84-year-old patient, somewhat anxious with her family at the

bedside.  She has undergone heart catheterization with severe aortic stenosis,

normal cardiac output. Aortic mean gradient of 44 mmHg, EF of 55%. Peak

gradient of 76, peak velocity of 438.  The patient's comorbidities include:

1. Age.

2. Some pulmonary dysfunction.

3. GI dysfunction.

 

At this time her STS score is 4.8. Morbidity, mortality at 20%. Frailty test

will be done by the TAVR coordinator. In the meantime, we will order a TAVR

CTA, carotid ultrasound and pulmonary function testing. Based upon the above

data the patient would deem a good candidate for transcatheter aortic valve

replacement.  She will also need a second opinion by Dr. Jag Ho.

 

 

Dictated by: Jackie Terwilliger, ARNP

 

 

 

                              _________________________________

                              MD ERINN Caba/VARSHA

D:  2017/5:31 PM

T:  2017/8:29 AM

Visit #:  [id number]

Job #:  [id number]

## 2017-08-22 NOTE — HHI.DCPOC
Discharge Care Plan


Diagnosis:  


(1) Aortic valve stenosis, severe


(2) NSTEMI (non-ST elevation myocardial infarction)


Goals to Promote Your Health


* To prevent worsening of your condition and complications


* To maintain your health at the optimal level


Directions to Meet Your Goals


*** Take your medications as prescribed


*** Follow your dietary instruction


*** Follow activity as directed








*** Keep your appointments as scheduled


*** Take your immunizations and boosters as scheduled


*** If your symptoms worsen call your PCP, if no PCP go to Urgent Care Center 

or Emergency Room***


*** Smoking is Dangerous to Your Health. Avoid second hand smoke***


***Call the 24-hour hour crisis hotline for domestic abuse at 1-446.508.5752***











Jaxson Rose MD Aug 22, 2017 09:23

## 2017-08-24 NOTE — RSPPFT
DATE OF PROCEDURE:   8/22/17



COMMENTS:   



The forced vital capacity is mildly reduced. The FEV1 is mildly reduced. The FEV1/FVC 
ratio and FEF 25-75 are both normal.

  



IMPRESSION:    

   

This is compatible  with mild restrictive lung disease.

## 2017-08-28 ENCOUNTER — HOSPITAL ENCOUNTER (INPATIENT)
Dept: HOSPITAL 17 - NEPE | Age: 82
LOS: 5 days | Discharge: HOME HEALTH | DRG: 812 | End: 2017-09-02
Payer: MEDICARE

## 2017-08-28 VITALS
DIASTOLIC BLOOD PRESSURE: 53 MMHG | SYSTOLIC BLOOD PRESSURE: 119 MMHG | HEART RATE: 91 BPM | RESPIRATION RATE: 18 BRPM | OXYGEN SATURATION: 97 % | TEMPERATURE: 98.4 F

## 2017-08-28 VITALS — WEIGHT: 159.84 LBS | BODY MASS INDEX: 27.29 KG/M2 | HEIGHT: 64 IN

## 2017-08-28 VITALS
RESPIRATION RATE: 20 BRPM | HEART RATE: 90 BPM | SYSTOLIC BLOOD PRESSURE: 131 MMHG | OXYGEN SATURATION: 99 % | TEMPERATURE: 98.4 F

## 2017-08-28 VITALS
HEART RATE: 94 BPM | RESPIRATION RATE: 20 BRPM | SYSTOLIC BLOOD PRESSURE: 122 MMHG | DIASTOLIC BLOOD PRESSURE: 59 MMHG | OXYGEN SATURATION: 99 % | TEMPERATURE: 98.5 F

## 2017-08-28 VITALS
TEMPERATURE: 98.4 F | SYSTOLIC BLOOD PRESSURE: 122 MMHG | HEART RATE: 102 BPM | RESPIRATION RATE: 18 BRPM | OXYGEN SATURATION: 99 % | DIASTOLIC BLOOD PRESSURE: 67 MMHG

## 2017-08-28 VITALS
SYSTOLIC BLOOD PRESSURE: 117 MMHG | TEMPERATURE: 98.5 F | RESPIRATION RATE: 20 BRPM | HEART RATE: 95 BPM | OXYGEN SATURATION: 95 % | DIASTOLIC BLOOD PRESSURE: 54 MMHG

## 2017-08-28 VITALS
TEMPERATURE: 98.4 F | SYSTOLIC BLOOD PRESSURE: 134 MMHG | OXYGEN SATURATION: 98 % | DIASTOLIC BLOOD PRESSURE: 64 MMHG | RESPIRATION RATE: 18 BRPM | HEART RATE: 96 BPM

## 2017-08-28 VITALS
RESPIRATION RATE: 20 BRPM | TEMPERATURE: 98.4 F | OXYGEN SATURATION: 97 % | DIASTOLIC BLOOD PRESSURE: 53 MMHG | SYSTOLIC BLOOD PRESSURE: 119 MMHG | HEART RATE: 90 BPM

## 2017-08-28 DIAGNOSIS — D50.9: Primary | ICD-10-CM

## 2017-08-28 DIAGNOSIS — K21.0: ICD-10-CM
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
## 2017-08-28 DIAGNOSIS — I35.0: ICD-10-CM

## 2017-08-28 DIAGNOSIS — I11.0: ICD-10-CM

## 2017-08-28 DIAGNOSIS — Z95.2: ICD-10-CM

## 2017-08-28 DIAGNOSIS — H35.30: ICD-10-CM

## 2017-08-28 DIAGNOSIS — K29.70: ICD-10-CM

## 2017-08-28 DIAGNOSIS — I50.9: ICD-10-CM

## 2017-08-28 DIAGNOSIS — E78.5: ICD-10-CM

## 2017-08-28 DIAGNOSIS — Z96.653: ICD-10-CM

## 2017-08-28 DIAGNOSIS — K52.9: ICD-10-CM

## 2017-08-28 DIAGNOSIS — K22.70: ICD-10-CM

## 2017-08-28 DIAGNOSIS — K44.9: ICD-10-CM

## 2017-08-28 LAB
ANION GAP SERPL CALC-SCNC: 8 MEQ/L (ref 5–15)
APTT BLD: 22.6 SEC (ref 24.3–30.1)
BACTERIA #/AREA URNS HPF: (no result) /HPF
BASOPHILS # BLD AUTO: 0 TH/MM3 (ref 0–0.2)
BASOPHILS NFR BLD: 0.4 % (ref 0–2)
BUN SERPL-MCNC: 45 MG/DL (ref 7–18)
CHLORIDE SERPL-SCNC: 103 MEQ/L (ref 98–107)
COLOR UR: YELLOW
COMMENT (UR): (no result)
CULTURE IF INDICATED: (no result)
EOSINOPHIL # BLD: 0 TH/MM3 (ref 0–0.4)
EOSINOPHIL NFR BLD: 0.1 % (ref 0–4)
ERYTHROCYTE [DISTWIDTH] IN BLOOD BY AUTOMATED COUNT: 19.9 % (ref 11.6–17.2)
FERRITIN SERPL-MCNC: 20 NG/ML (ref 8–252)
GFR SERPLBLD BASED ON 1.73 SQ M-ARVRAT: 65 ML/MIN (ref 89–?)
GLUCOSE UR STRIP-MCNC: (no result) MG/DL
HCO3 BLD-SCNC: 25 MEQ/L (ref 21–32)
HCT VFR BLD CALC: 17.4 % (ref 35–46)
HEMO FLAGS: (no result)
HGB UR QL STRIP: (no result)
INR PPP: 0.9 RATIO
KETONES UR STRIP-MCNC: (no result) MG/DL
LYMPHOCYTES # BLD AUTO: 1.1 TH/MM3 (ref 1–4.8)
LYMPHOCYTES NFR BLD AUTO: 12.3 % (ref 9–44)
MAGNESIUM SERPL-MCNC: 2.2 MG/DL (ref 1.5–2.5)
MCH RBC QN AUTO: 26 PG (ref 27–34)
MCHC RBC AUTO-ENTMCNC: 32.3 % (ref 32–36)
MCV RBC AUTO: 80.5 FL (ref 80–100)
MONOCYTES NFR BLD: 4.9 % (ref 0–8)
NEUTROPHILS # BLD AUTO: 7.6 TH/MM3 (ref 1.8–7.7)
NEUTROPHILS NFR BLD AUTO: 82.3 % (ref 16–70)
NITRITE UR QL STRIP: (no result)
PLATELET # BLD: 252 TH/MM3 (ref 150–450)
POTASSIUM SERPL-SCNC: 4.2 MEQ/L (ref 3.5–5.1)
PROTHROMBIN TIME: 10.3 SEC (ref 9.8–11.6)
RBC # BLD AUTO: 2.16 MIL/MM3 (ref 4–5.3)
SODIUM SERPL-SCNC: 136 MEQ/L (ref 136–145)
SP GR UR STRIP: 1.02 (ref 1–1.03)
SQUAMOUS #/AREA URNS HPF: <1 /HPF (ref 0–5)
TRANS CELLS #/AREA URNS HPF: 1 /HPF
TRANSFERRIN IRON PROFILE: 297 MG/DL (ref 200–360)
WBC # BLD AUTO: 9.2 TH/MM3 (ref 4–11)

## 2017-08-28 PROCEDURE — C9113 INJ PANTOPRAZOLE SODIUM, VIA: HCPCS

## 2017-08-28 PROCEDURE — 85730 THROMBOPLASTIN TIME PARTIAL: CPT

## 2017-08-28 PROCEDURE — 76937 US GUIDE VASCULAR ACCESS: CPT

## 2017-08-28 PROCEDURE — 71010: CPT

## 2017-08-28 PROCEDURE — 85007 BL SMEAR W/DIFF WBC COUNT: CPT

## 2017-08-28 PROCEDURE — 83010 ASSAY OF HAPTOGLOBIN QUANT: CPT

## 2017-08-28 PROCEDURE — 83880 ASSAY OF NATRIURETIC PEPTIDE: CPT

## 2017-08-28 PROCEDURE — 86901 BLOOD TYPING SEROLOGIC RH(D): CPT

## 2017-08-28 PROCEDURE — 83735 ASSAY OF MAGNESIUM: CPT

## 2017-08-28 PROCEDURE — 30253N1: ICD-10-PCS

## 2017-08-28 PROCEDURE — 86850 RBC ANTIBODY SCREEN: CPT

## 2017-08-28 PROCEDURE — P9016 RBC LEUKOCYTES REDUCED: HCPCS

## 2017-08-28 PROCEDURE — 82248 BILIRUBIN DIRECT: CPT

## 2017-08-28 PROCEDURE — 83615 LACTATE (LD) (LDH) ENZYME: CPT

## 2017-08-28 PROCEDURE — 82728 ASSAY OF FERRITIN: CPT

## 2017-08-28 PROCEDURE — 90732 PPSV23 VACC 2 YRS+ SUBQ/IM: CPT

## 2017-08-28 PROCEDURE — 85025 COMPLETE CBC W/AUTO DIFF WBC: CPT

## 2017-08-28 PROCEDURE — 36430 TRANSFUSION BLD/BLD COMPNT: CPT

## 2017-08-28 PROCEDURE — 90471 IMMUNIZATION ADMIN: CPT

## 2017-08-28 PROCEDURE — 82746 ASSAY OF FOLIC ACID SERUM: CPT

## 2017-08-28 PROCEDURE — 83550 IRON BINDING TEST: CPT

## 2017-08-28 PROCEDURE — 83540 ASSAY OF IRON: CPT

## 2017-08-28 PROCEDURE — 86920 COMPATIBILITY TEST SPIN: CPT

## 2017-08-28 PROCEDURE — 82607 VITAMIN B-12: CPT

## 2017-08-28 PROCEDURE — 81001 URINALYSIS AUTO W/SCOPE: CPT

## 2017-08-28 PROCEDURE — 86900 BLOOD TYPING SEROLOGIC ABO: CPT

## 2017-08-28 PROCEDURE — 85027 COMPLETE CBC AUTOMATED: CPT

## 2017-08-28 PROCEDURE — 93005 ELECTROCARDIOGRAM TRACING: CPT

## 2017-08-28 PROCEDURE — G0009 ADMIN PNEUMOCOCCAL VACCINE: HCPCS

## 2017-08-28 PROCEDURE — 80048 BASIC METABOLIC PNL TOTAL CA: CPT

## 2017-08-28 PROCEDURE — 82247 BILIRUBIN TOTAL: CPT

## 2017-08-28 PROCEDURE — 85610 PROTHROMBIN TIME: CPT

## 2017-08-28 RX ADMIN — PANTOPRAZOLE SODIUM SCH MG: 40 INJECTION, POWDER, FOR SOLUTION INTRAVENOUS at 20:00

## 2017-08-28 RX ADMIN — Medication SCH ML: at 21:00

## 2017-08-28 NOTE — PD
HPI


Chief Complaint:  General Weakness


Time Seen by Provider:  16:22


Travel History


International Travel<30 days:  No


Contact w/Intl Traveler<30days:  No


Traveled to known affect area:  No





History of Present Illness


HPI


Patient comes in complaining of weakness and dyspnea on exertion that began 

yesterday.  Patient states she felt dizzy when she was using her walker but has 

resolved.  Patient denies any chest pain, numbness or tingling anywhere, back 

pain, loss or change in bowel or bladder.  States she does have dark stools but 

she is on iron for anemia.  Patient reports she had a cardiac cath done a week 

ago had no stents placed but recommend aortic valve replacement.  Patient 

states she contacted her cardiologist Dr. Low who recommended patient get a 

chest x-ray.  Patient states her caretaker would not drive her to the emergency 

department and had to come in by EMS.  Patient states she symptoms resolve with 

rest.  He has a chest pain, fevers, headache, numbness or tingling anywhere, or 

abdominal pain.





PFSH


Past Medical History


Hx Anticoagulant Therapy:  Yes


Anemia:  Yes


Arthritis:  Yes


Autoimmune Disease:  No


Anxiety:  Yes


Depression:  Yes


Heart Rhythm Problems:  No


Cancer:  No


Cardiovascular Problems:  Yes (HTN)


High Cholesterol:  Yes


Chest Pain:  No


Congestive Heart Failure:  Yes (THIS ADMIT)


Developmental Delay:  Yes


Diabetes:  No


Diminished Hearing:  No


Endocrine:  No


GERD:  Yes


Glaucoma:  No


Genitourinary:  No


Hepatitis:  No


Hiatal Hernia:  No


Hypertension:  Yes


Immune Disorder:  No


Implanted Vascular Access Dvce:  Yes


Kidney Stones:  No


Medical other:  Yes (arthritis)


Musculoskeletal:  Yes


Neurologic:  No


Psychiatric:  Yes


Reproductive:  No


Respiratory:  No


Immunizations Current:  Yes


Renal Failure:  No


Thyroid Disease:  No


Ulcer:  No


Menopausal:  Yes





Past Surgical History


Abdominal Surgery:  Yes (HYSTERECTOMY)


Body Medical Devices:  FRANKIE EYE LENS


Cardiac Surgery:  No


Ear Surgery:  No


Endocrine Surgery:  No


Eye Surgery:  Yes (BILATERAL CATARACT SX; LEFT RETINA SX x2)


Genitourinary Surgery:  No


Gynecologic Surgery:  Yes (HYSTERECTOMY in 1982)


Hysterectomy:  Yes


Joint Replacement:  Yes (Bilateral Knee)


Oral Surgery:  No


Thoracic Surgery:  No


Other Surgery:  Yes (BILAT. TOTAL KNEE REPLACEMENTS)





Social History


Alcohol Use:  Yes (occas)


Tobacco Use:  No (QUIT 30 YRS)


Substance Use:  No





Allergies-Medications


(Allergen,Severity, Reaction):  


Coded Allergies:  


     morphine (Unverified  Allergy, Intermediate, 8/28/17)


Reported Meds & Prescriptions





Reported Meds & Active Scripts


Active


Potassium Chloride Microencaps 20 Meq Tab 20 Meq PO DAILY


Furosemide 20 Mg Tab 20 Mg PO DAILY


Adult Aspirin EC Low Strength (Aspirin) 81 Mg Tabec 81 Mg PO DAILY 90 Days


Lisinopril 5 Mg Tab 2.5 Mg PO DAILY


Plavix (Clopidogrel Bisulfate) 75 Mg Tab 75 Mg PO DAILY


Zoloft (Sertraline HCl) 50 Mg Tab 150 Mg PO DAILY


Reported


Pantoprazole (Pantoprazole Sodium) 40 Mg Tab 40 Mg PO DAILY


Poly-Iron 150 (Polysaccharide Iron Complex) 150 Mg Iron Cap 150 Mg PO DAILY


Atorvastatin (Atorvastatin Calcium) 20 Mg Tab 20 Mg PO HS


Ocuvite (Multiple Vitamins W/ Minerals) 1 Tab 1 Tab PO DAILY


Cetirizine (Cetirizine HCl) 10 Mg Tab 10 Mg PO DAILY


Xanax (Alprazolam) 0.25 Mg Tab 0.25 Mg PO TID PRN








Review of Systems


Except as stated in HPI:  all other systems reviewed are Neg





Physical Exam


Narrative


GENERAL: Well-developed, well nourished, in no acute distress, and non-ill 

appearing.


SKIN: Focused skin assessment warm and dry.


HEAD: Atraumatic. Normocephalic. 


EYES: Pupils equal and round. EOMI. No scleral icterus. No injection or 

drainage. 


ENT: No nasal bleeding or discharge.  Mucous membranes pink and moist.


NECK: Trachea midline. Supple.  No nuclear rigidity.


CARDIOVASCULAR: Regular rate and rhythm. Murmur appreciated.


RESPIRATORY: No accessory muscle use.  No respiratory distress.  Decreased 

breath sounds throughout. 


MUSCULOSKELETAL: No obvious deformities. No clubbing.  No cyanosis.  No edema.  

Full range of motion.


NEUROLOGICAL: Awake and alert. No obvious cranial nerve deficits.  Motor 

grossly within normal limits. Normal speech.


PSYCHIATRIC: Appropriate mood and affect; insight and judgment normal.





Data


Data


Last Documented VS





Vital Signs








  Date Time  Temp Pulse Resp B/P (MAP) Pulse Ox O2 Delivery O2 Flow Rate FiO2


 


8/28/17 16:20 98.4 90 20 119/53 (75) 97 Room Air  








Orders





 Orders


Complete Blood Count With Diff (8/28/17 16:29)


Basic Metabolic Panel (Bmp) (8/28/17 16:29)


B-Type Natriuretic Peptide (8/28/17 16:29)


Act Partial Throm Time (Ptt) (8/28/17 16:29)


Prothrombin Time / Inr (Pt) (8/28/17 16:29)


Magnesium (Mg) (8/28/17 16:29)


Urinalysis - C+S If Indicated (8/28/17 16:29)


Iv Access Insert/Monitor (8/28/17 16:29)


Electrocardiogram (8/28/17 16:29)


Ecg Monitoring (8/28/17 16:29)


Oximetry (8/28/17 16:29)


Oxygen Administration (8/28/17 16:29)


Chest, Single Ap (8/28/17 16:29)


Sodium Chloride 0.9% Flush (Ns Flush) (8/28/17 16:30)


Type And Screen (8/28/17 17:18)


Red Blood Cells (Rbc) (8/28/17 17:18)


Blood Product Administration .UPON TRANSFUSION (8/28/17 17:18)


Sodium Chlor 0.9% 250 Ml Inj (Ns 250 Ml (8/28/17 17:30)


Admit To Inpatient (8/28/17 )


Code Status (8/28/17 17:33)


Vital Signs (Adult) Q4H (8/28/17 17:33)


Activity Oob With Assistance (8/28/17 17:33)


Cardiac Monitor / Telemetry .CONTINUOUS (8/28/17 17:33)


Diet Heart Healthy (8/28/17 Dinner)


Sodium Chloride 0.9% Flush (Ns Flush) (8/28/17 17:45)


Sodium Chloride 0.9% Flush (Ns Flush) (8/28/17 21:00)


Acetaminophen (Tylenol) (8/28/17 17:45)


Ondansetron Inj (Zofran Inj) (8/28/17 17:45)


Temazepam (Restoril) (8/28/17 17:45)


Basic Metabolic Panel (Bmp) (8/29/17 06:00)


Electrocardiogram (8/28/17 17:33)


Pt Request For Service (8/28/17 17:33)


Scd Bilateral/Knee High LEENA.BID (8/28/17 17:33)


Naloxone Inj (Narcan Inj) (8/28/17 17:45)


Magnesium Hydroxide Liq (Milk Of Magnesi (8/28/17 17:45)


Inpatient Certification (8/28/17 )


Complete Blood Count With Diff (8/28/17 17:36)


Complete Blood Count With Diff (8/29/17 17:36)


Consult Gastroenterology (8/28/17 )


Alprazolam (Xanax) (8/28/17 17:45)


Aspirin Ec (Ecotrin Ec) (8/29/17 09:00)


Atorvastatin (Lipitor) (8/28/17 21:00)


Cetirizine (Zyrtec) (8/29/17 09:00)


Clopidogrel (Plavix) (8/29/17 09:00)


Furosemide (Lasix) (8/29/17 09:00)


Lisinopril (Prinivil) (8/29/17 09:00)


Multivit Opth (Ocuvite) (8/29/17 09:00)


Polysaccharide Iron Complex (Nu-Iron) (8/29/17 09:00)


Potassium Chloride (Kcl) (8/29/17 09:00)


Sertraline (Zoloft) (8/29/17 09:00)


Pantoprazole Inj (Protonix Inj) (8/28/17 17:45)


Admit Order (Ed Use Only) (8/28/17 17:47)





Labs





Laboratory Tests








Test


  8/28/17


16:37


 


White Blood Count 9.2 TH/MM3 


 


Red Blood Count 2.16 MIL/MM3 


 


Hemoglobin 5.6 GM/DL 


 


Hematocrit 17.4 % 


 


Mean Corpuscular Volume 80.5 FL 


 


Mean Corpuscular Hemoglobin 26.0 PG 


 


Mean Corpuscular Hemoglobin


Concent 32.3 % 


 


 


Red Cell Distribution Width 19.9 % 


 


Platelet Count 252 TH/MM3 


 


Mean Platelet Volume 8.7 FL 


 


Neutrophils (%) (Auto) 82.3 % 


 


Lymphocytes (%) (Auto) 12.3 % 


 


Monocytes (%) (Auto) 4.9 % 


 


Eosinophils (%) (Auto) 0.1 % 


 


Basophils (%) (Auto) 0.4 % 


 


Neutrophils # (Auto) 7.6 TH/MM3 


 


Lymphocytes # (Auto) 1.1 TH/MM3 


 


Monocytes # (Auto) 0.5 TH/MM3 


 


Eosinophils # (Auto) 0.0 TH/MM3 


 


Basophils # (Auto) 0.0 TH/MM3 


 


CBC Comment DIFF FINAL 


 


Differential Comment  


 


Prothrombin Time 10.3 SEC 


 


Prothromb Time International


Ratio 0.9 RATIO 


 


 


Activated Partial


Thromboplast Time 22.6 SEC 


 


 


Blood Urea Nitrogen 45 MG/DL 


 


Creatinine 0.84 MG/DL 


 


Random Glucose 110 MG/DL 


 


Calcium Level 8.1 MG/DL 


 


Magnesium Level 2.2 MG/DL 


 


Sodium Level 136 MEQ/L 


 


Potassium Level 4.2 MEQ/L 


 


Chloride Level 103 MEQ/L 


 


Carbon Dioxide Level 25.0 MEQ/L 


 


Anion Gap 8 MEQ/L 


 


Estimat Glomerular Filtration


Rate 65 ML/MIN 


 











MDM


Medical Decision Making


Medical Screen Exam Complete:  Yes


Emergency Medical Condition:  Yes


Interpretation(s)


Chest x-ray read by the radiologist shows: No acute disease.


Differential Diagnosis


CHF exacerbation, anemia, electrolyte abnormality, UTI, pneumonia, other


Narrative Course


Patient seen and examined.  Initial laboratory radiologic studies were ordered.

  After patient was found to be a severe anemic.  Type and screen along with 

transfusion was ordered by Dr. Evangelista.  Discussed patient with Dr. Puckett, 

who is agreeable to admit the patient.  Discussed all findings and plan care of 

patient who is agreeable for admission.  All questions were answered.





Physician Communication


Physician Communication


[id number] discussed patient with Dr. Puckett, who is agreeable to admit the patient.





Diagnosis





 Primary Impression:  


 Symptomatic anemia





Admitting Information


Admitting Physician Requests:  Admit


Condition:  Stable











Arsalan Dougherty Aug 28, 2017 16:37

## 2017-08-28 NOTE — RADRPT
EXAM DATE/TIME:  08/28/2017 16:44 

 

[facility] COMPARISON:     

CHEST SINGLE AP, August 18, 2017, 23:35.

 

                     

INDICATIONS :     

Shortness of breath.

                     

 

MEDICAL HISTORY :     

Congestive heart failure.  Hypertension  Gastroesophageal reflux disease.      

 

SURGICAL HISTORY :     

Hysterectomy.   

 

ENCOUNTER:     

Initial                                        

 

ACUITY:     

2 days      

 

PAIN SCORE:     

0/10

 

LOCATION:     

Bilateral chest 

 

FINDINGS:     

A single view of the chest demonstrates the lungs to be symmetrically aerated without evidence of mas
s, infiltrate or effusion.  The cardiomediastinal contours are unremarkable.  Osseous structures are 
intact.

 

CONCLUSION:     No acute disease.  

 

 

 

 Tanner Vann MD FACR on August 28, 2017 at 17:08           

Board Certified Radiologist.

 This report was verified electronically.

## 2017-08-28 NOTE — HHI.HP
HPI


Service


[facility]


Primary Care Physician


Dr. Gamez


Admission Diagnosis





severe anemia, generalized weakness


Chief Complaint:  


SOB, weakness


Travel History


International Travel<30 Days:  No


Contact w/Intl Traveler <30 Da:  No


Traveled to Known Affected Are:  No


History of Present Illness


Mrs. Dillon is a pleasant 85 y/o WF with iron deficiency anemia/GIB/SB 

angioectasia, severe aortic stenosis, hx of CHF, HTN, and hyperlipidemia. She 

has had multiple recent admissions. Her last admission was from - for 

SOB and felt to have acute CHF symptoms related to severe AVS. She had an 

echocardiogram (17) which noted severe aortic valve stenosis and she was 

initially decompensated. Pt was diuresed with improvement. She was evaluated 

with a  LHC and consideration for AVR and this was performed on  and showed 

critical AS but nonobstructive CAD. She was seen by CTS who did the initial 

evaluation for TAVR vs traditional AVR. It was decided to send the pt home on 

ACE/diuretic/ASA/Plavix and monitored for bleeding as she has a hx of previous 

GIB. Pt has had ongoing issues with anemia and possible GIB. She was previously 

admitted in 2017 and underwent Small bowel enteroscopy/incomplete 

colonoscopy (17) which noted thickened fold in the duodenum, otherwise 

normal enteroscopy and unfortunately the scope was unable to be passed beyond 

the sigmoid 25 cm secondary to either twisted colon, spasm or external 

compression, it was felt to not be safe to push the scope without seeing the 

lumen because of fear of perforation so endoscopy was terminated. As an outpt 

she had Capsule endoscopy (17) which noted multiple angiectasia with active 

bleeding and pt was sent for antegrade single balloon enteroscopy (17) but 

unfortunately there was no active bleeding found and the enteroscopy was found 

to be normal except for a hiatal hernia. Pts outpt labs had been stable with 

Hgb ranging between 9-10. Her last labs on  noted Hgb 9.3. She reports that 

she developed increased SOB which started yesterday with increased weakness and 

has required the use of her walker. Pt reports that her stools are always dark 

as she takes iron supplements. Her labs in the ED noted a drop in her H/H to 5.6

/17.4. She denies any change in her bowel habits, nausea/vomiting, reflux, 

dysphagia, or BRBPR.





Review of Systems


Constitutional:  DENIES: Fever, Chills


Eyes:  DENIES: Blurred vision


Ears, nose, mouth, throat:  DENIES: Hearing loss


Respiratory:  COMPLAINS OF: Shortness of breath, DENIES: Cough


Cardiovascular:  COMPLAINS OF: Dyspnea on Exertion, DENIES: Chest pain, 

Palpitations


Gastrointestinal:  COMPLAINS OF: Black stools, DENIES: Abdominal pain, 

Constipation, Diarrhea, Nausea, Reflux, Vomiting


Musculoskeletal:  DENIES: Back pain, Neck pain


Integumentary:  DENIES: Rash


Neurologic:  DENIES: Headache


Psychiatric:  DENIES: Confusion





Past Family Social History


Past Medical History


Iron deficiency anemia/GIB/SB angiectasia


Severe aortic stenosis


Barretts esophagus


CHF


Diverticulosis


HTN


Hyperlipidemia


Lumbar stenosis


Macular degeneration


Depression/Anxiety


Past Surgical History


Antegrade single balloon enteroscopy (17) --> Normal enteroscopy except for 

a hiatal hernia


Capsule endoscopy (17) --> Multiple angiectasia with active bleeding


Small bowel enteroscopy (17) --> thickened fold in the duodenum Bx done 

otherwise normal


Colonoscopy (17) --> Scope was unable to be passed beyond the sigmoid 25 cm 

secondary to either twisted colon, spasm or external compression, it was felt 

to not


 be safe to push the scope without seeing the lumen because of fear of 

perforation so endoscopy was terminated


Colonoscopy (16) --> Severe diverticulosis in the left colon, colitis, 

hemorrhoids, small polyp


EGD/colonoscopy (16) --> gastritis, HH, esophagitis, diverticulosis, 

hemorrhoids, polyps, incomplete colonoscopy


Colonoscopy (2/10/16) --> Very tortuous, edematous sigmoid colon, possible 

diverticular colitis, diminutive polyps in the ascending colon, sigmoid 

diverticulosis, grade I hemorrhoids,     decreased sphincter tone. 





Cataract surgery


Knee arthroscopy/arthroplasty


VICKEY


Reported Medications


Potassium Chloride Microencaps 20 Meq Tab 20 Meq PO DAILY


Furosemide 20 Mg Tab 20 Mg PO DAILY


Adult Aspirin EC Low Strength (Aspirin) 81 Mg Tabec 81 Mg PO DAILY 90 Days


Lisinopril 5 Mg Tab 2.5 Mg PO DAILY


Plavix (Clopidogrel Bisulfate) 75 Mg Tab 75 Mg PO DAILY


Zoloft (Sertraline HCl) 50 Mg Tab 150 Mg PO DAILY


Pantoprazole (Pantoprazole Sodium) 40 Mg Tab 40 Mg PO DAILY


Poly-Iron 150 (Polysaccharide Iron Complex) 150 Mg Iron Cap 150 Mg PO DAILY


Atorvastatin (Atorvastatin Calcium) 20 Mg Tab 20 Mg PO HS


Ocuvite (Multiple Vitamins W/ Minerals) 1 Tab 1 Tab PO DAILY


Cetirizine (Cetirizine HCl) 10 Mg Tab 10 Mg PO DAILY


Xanax (Alprazolam) 0.25 Mg Tab 0.25 Mg PO TID PRN


Allergies:  


Coded Allergies:  


     morphine (Unverified  Allergy, Intermediate, 17)


Family History


Noncontributory


Social History


Denies any tobacco use


Occasionally drinks beer or vodka


Pt lives alone, she is a 


Her children live out of town


She has a family friend who looks after her here locally





Physical Exam


Vital Signs





Vital Signs








  Date Time  Temp Pulse Resp B/P (MAP) Pulse Ox O2 Delivery O2 Flow Rate FiO2


 


17 16:20 98.4 90 20 119/53 (75) 97 Room Air  


 


17 16:20  90 20  97 Room Air  


 


17 16:16 98.4 91 18 119/53 (75) 97   








Physical Exam


GENERAL: This is a well-nourished, well-developed patient, in no apparent 

distress.


HEENT: Atraumatic. Normocephalic. No temporal or scalp tenderness. Pale 

conjunctiva. Airway patent.


NECK: Trachea midline, supple, nontender.


CARDIO: Regular. 4/6 CHANTELLE


RESP: CTA bilaterally. No wheezes, rales, or rhonchi.  


ABD: +BS, soft, non-tender, nondistended. 


EXT: Extremities without clubbing, cyanosis, or edema. 


NEURO: Awake and alert. Motor and sensory grossly within normal limits. Normal 

speech.


Laboratory





Laboratory Tests








Test


  17


16:37


 


White Blood Count 9.2 


 


Red Blood Count 2.16 


 


Hemoglobin 5.6 


 


Hematocrit 17.4 


 


Mean Corpuscular Volume 80.5 


 


Mean Corpuscular Hemoglobin 26.0 


 


Mean Corpuscular Hemoglobin


Concent 32.3 


 


 


Red Cell Distribution Width 19.9 


 


Platelet Count 252 


 


Mean Platelet Volume 8.7 


 


Neutrophils (%) (Auto) 82.3 


 


Lymphocytes (%) (Auto) 12.3 


 


Monocytes (%) (Auto) 4.9 


 


Eosinophils (%) (Auto) 0.1 


 


Basophils (%) (Auto) 0.4 


 


Neutrophils # (Auto) 7.6 


 


Lymphocytes # (Auto) 1.1 


 


Monocytes # (Auto) 0.5 


 


Eosinophils # (Auto) 0.0 


 


Basophils # (Auto) 0.0 


 


CBC Comment DIFF FINAL 


 


Differential Comment  


 


Prothrombin Time 10.3 


 


Prothromb Time International


Ratio 0.9 


 


 


Activated Partial


Thromboplast Time 22.6 


 


 


Blood Urea Nitrogen 45 


 


Creatinine 0.84 


 


Random Glucose 110 


 


Calcium Level 8.1 


 


Magnesium Level 2.2 


 


Sodium Level 136 


 


Potassium Level 4.2 


 


Chloride Level 103 


 


Carbon Dioxide Level 25.0 


 


Anion Gap 8 


 


Estimat Glomerular Filtration


Rate 65 


 








Result Diagram:  


17 1637                                                                   

             17 163








Septic Shock Reassessment


Heart:  Regular rate and rhythm


Lungs:  Clear


Skin:  Warm





Caprini VTE Risk Assessment


Caprini VTE Risk Assessment:  Mod/High Risk (score >= 2)


VTE Pharm Contraindication:  obscure GIB


Caprini Risk Assessment Model











 Point Value = 1          Point Value = 2  Point Value = 3  Point Value = 5


 


Age 41-60


Minor surgery


BMI > 25 kg/m2


Swollen legs


Varicose veins


Pregnancy or postpartum


History of unexplained or recurrent


   spontaneous 


Oral contraceptives or hormone


   replacement


Sepsis (< 1 month)


Serious lung disease, including


   pneumonia (< 1 month)


Abnormal pulmonary function


Acute myocardial infarction


Congestive heart failure (< 1 month)


History of inflammatory bowel disease


Medical patient at bed rest Age 61-74


Arthroscopic surgery


Major open surgery (> 45 min)


Laparoscopic surgery (> 45 min)


Malignancy


Confined to bed (> 72 hours)


Immobilizing plaster cast


Central venous access Age >= 75


History of VTE


Family history of VTE


Factor V Leiden


Prothrombin 54958O


Lupus anticoagulant


Anticardiolipin antibodies


Elevated serum homocysteine


Heparin-induced thrombocytopenia


Other congenital or acquired


   thrombophilia Stroke (< 1 month)


Elective arthroplasty


Hip, pelvis, or leg fracture


Acute spinal cord injury (< 1 month)








Prophylaxis Regimen











   Total Risk


Factor Score Risk Level Prophylaxis Regimen


 


0-1      Low Early ambulation


 


2 Moderate Order ONE of the following:


*Sequential Compression Device (SCD)


*Heparin 5000 units SQ BID


 


3-4 Higher Order ONE of the following medications:


*Heparin 5000 units SQ TID


*Enoxaparin/Lovenox 40 mg SQ daily (WT < 150 kg, CrCl > 30 mL/min)


*Enoxaparin/Lovenox 30 mg SQ daily (WT < 150 kg, CrCl > 10-29 mL/min)


*Enoxaparin/Lovenox 30 mg SQ BID (WT < 150 kg, CrCl > 30 mL/min)


AND/OR


*Sequential Compression Device (SCD)


 


5 or more Highest Order ONE of the following medications:


*Heparin 5000 units SQ TID (Preferred with Epidurals)


*Enoxaparin/Lovenox 40 mg SQ daily (WT < 150 kg, CrCl > 30 mL/min)


*Enoxaparin/Lovenox 30 mg SQ daily (WT < 150 kg, CrCl > 10-29 mL/min)


*Enoxaparin/Lovenox 30 mg SQ BID (WT < 150 kg, CrCl > 30 mL/min)


AND


*Sequential Compression Device (SCD)











Assessment and Plan


Problem List:  


(1) Symptomatic anemia


ICD Codes:  D64.9 - Anemia, unspecified


Status:  Chronic


Plan:  


- Pt is an 85 y/o WF with iron deficiency anemia/GIB/SB angioectasia, severe 

aortic stenosis, hx of CHF, 


 HTN, and hyperlipidemia.  


- Pt has had ongoing issues with anemia and obscure GIB. 


- She was previously admitted in 2017 and underwent Small bowel enteroscopy

/incomplete colonoscopy 


 (17) which noted thickened fold in the duodenum, otherwise normal 

enteroscopy and unfortunately the scope 


 was unable to be passed beyond the sigmoid 25 cm secondary to either twisted 

colon, spasm or external 


 compression, it was felt to not be safe to push the scope without seeing the 

lumen because of fear of perforation 


 so endoscopy was terminated. 


- As an outpt she had Capsule endoscopy (17) which noted multiple 

angiectasia with active bleeding and pt 


 was sent for antegrade single balloon enteroscopy (17) but unfortunately 

there was no active bleeding found


 and the enteroscopy was found to be normal except for a hiatal hernia. 


- Pts outpt labs had been stable with Hgb ranging between 9-10. Her last labs 

on  noted Hgb 9.3. 


- She reports that she developed increased SOB which started yesterday with 

increased weakness and has required


 the use of her walker. Pt reports that her stools are always dark as she takes 

iron supplements. 


- Her labs in the ED noted a drop in her H/H to 5.6/17.4. 


- Check iron studies prior to transfusion


- Pt to be transfused with 2 units of PRBCs 


- Monitor H/H


- GI consult


- Hematology consult requested by family


- Monitor closely for any signs of volume overload with transfusion


- Hold ASA/Plavix which were started during last admission for severe AS in 

anticipation for AV replacement


- Supportive care





- DVT prophylaxis with SCDs





(2) GI bleed


ICD Codes:  K92.2 - Gastrointestinal hemorrhage, unspecified


Status:  Resolved


Plan:  


- See above





(3) Aortic valve stenosis, severe


ICD Codes:  I35.0 - Nonrheumatic aortic (valve) stenosis


Status:  Acute


Plan:  


- Her last admission was from - for SOB and felt to have acute CHF 

symptoms related to 


 severe AVS. 


- 2D echocardiogram (17) which noted severe aortic valve stenosis and she 

was initially decompensated. 


 Pt was diuresed with improvement. 


- She was evaluated with a  LHC and consideration for AVR and this was 

performed on  and showed critical


 AS but nonobstructive CAD. 


- She was seen by CTS who did the initial evaluation for TAVR vs traditional 

AVR.


-  It was decided to send the pt home on ACE/diuretic/ASA/Plavix and monitored 

for bleeding as she has a hx of previous GIB. 





(4) Hypertension


ICD Codes:  I10 - Essential (primary) hypertension


Status:  Chronic


Plan:  


- Home meds continued


- Monitor





(5) Hyperlipidemia


ICD Codes:  E78.5 - Hyperlipidemia, unspecified


Status:  Chronic


Plan:  


- Home meds continued





(6) CHF (congestive heart failure)


ICD Codes:  I50.9 - Heart failure, unspecified


Status:  Acute


Assessment and Plan


Patient examined.


Assessment and plan formulated with Kathryn Mcgowan PA-C.


I agree with the above.





Physician Certification


2 Midnight Certification Type:  Admission for Inpatient Services


Order for Inpatient Services


The services are ordered in accordance with Medicare regulations or non-

Medicare payer requirements, as applicable.  In the case of services not 

specified as inpatient-only, they are appropriately provided as inpatient 

services in accordance with the 2-midnight benchmark.


Estimated LOS (days):  3


3 days is the estimated time the patient will need to remain in the hospital, 

assuming treatment plan goals are met and no additional complications.


Post-Hospital Plan:  Not yet determined





Problem Qualifiers





(1) CHF (congestive heart failure):  








Kathryn Mcgowan Aug 28, 2017 18:06


Zia Puckett DO Aug 31, 2017 01:36

## 2017-08-29 VITALS
DIASTOLIC BLOOD PRESSURE: 69 MMHG | TEMPERATURE: 97 F | SYSTOLIC BLOOD PRESSURE: 115 MMHG | RESPIRATION RATE: 17 BRPM | HEART RATE: 91 BPM | OXYGEN SATURATION: 98 %

## 2017-08-29 VITALS
TEMPERATURE: 96 F | SYSTOLIC BLOOD PRESSURE: 96 MMHG | HEART RATE: 90 BPM | OXYGEN SATURATION: 96 % | RESPIRATION RATE: 17 BRPM | DIASTOLIC BLOOD PRESSURE: 54 MMHG

## 2017-08-29 VITALS
TEMPERATURE: 95.4 F | HEART RATE: 93 BPM | SYSTOLIC BLOOD PRESSURE: 126 MMHG | OXYGEN SATURATION: 96 % | RESPIRATION RATE: 17 BRPM | DIASTOLIC BLOOD PRESSURE: 71 MMHG

## 2017-08-29 VITALS
RESPIRATION RATE: 17 BRPM | OXYGEN SATURATION: 95 % | DIASTOLIC BLOOD PRESSURE: 58 MMHG | HEART RATE: 91 BPM | SYSTOLIC BLOOD PRESSURE: 126 MMHG | TEMPERATURE: 97.1 F

## 2017-08-29 VITALS
OXYGEN SATURATION: 96 % | TEMPERATURE: 98 F | SYSTOLIC BLOOD PRESSURE: 100 MMHG | RESPIRATION RATE: 18 BRPM | HEART RATE: 91 BPM | DIASTOLIC BLOOD PRESSURE: 55 MMHG

## 2017-08-29 VITALS
DIASTOLIC BLOOD PRESSURE: 56 MMHG | TEMPERATURE: 97.8 F | RESPIRATION RATE: 17 BRPM | SYSTOLIC BLOOD PRESSURE: 109 MMHG | HEART RATE: 93 BPM | OXYGEN SATURATION: 97 %

## 2017-08-29 VITALS
HEART RATE: 90 BPM | OXYGEN SATURATION: 97 % | RESPIRATION RATE: 17 BRPM | SYSTOLIC BLOOD PRESSURE: 107 MMHG | TEMPERATURE: 97.4 F | DIASTOLIC BLOOD PRESSURE: 54 MMHG

## 2017-08-29 VITALS — OXYGEN SATURATION: 97 %

## 2017-08-29 LAB
ANION GAP SERPL CALC-SCNC: 10 MEQ/L (ref 5–15)
BASOPHILS # BLD AUTO: 0 TH/MM3 (ref 0–0.2)
BASOPHILS NFR BLD: 0.5 % (ref 0–2)
BUN SERPL-MCNC: 48 MG/DL (ref 7–18)
CHLORIDE SERPL-SCNC: 105 MEQ/L (ref 98–107)
EOSINOPHIL # BLD: 0.1 TH/MM3 (ref 0–0.4)
EOSINOPHIL NFR BLD: 1.5 % (ref 0–4)
ERYTHROCYTE [DISTWIDTH] IN BLOOD BY AUTOMATED COUNT: 18.4 % (ref 11.6–17.2)
GFR SERPLBLD BASED ON 1.73 SQ M-ARVRAT: 68 ML/MIN (ref 89–?)
HCO3 BLD-SCNC: 23.3 MEQ/L (ref 21–32)
HCT VFR BLD CALC: 23.1 % (ref 35–46)
HEMO FLAGS: (no result)
LYMPHOCYTES # BLD AUTO: 1.8 TH/MM3 (ref 1–4.8)
LYMPHOCYTES NFR BLD AUTO: 20.4 % (ref 9–44)
MCH RBC QN AUTO: 28.4 PG (ref 27–34)
MCHC RBC AUTO-ENTMCNC: 34 % (ref 32–36)
MCV RBC AUTO: 83.6 FL (ref 80–100)
MONOCYTES NFR BLD: 7.9 % (ref 0–8)
NEUTROPHILS # BLD AUTO: 6 TH/MM3 (ref 1.8–7.7)
NEUTROPHILS NFR BLD AUTO: 69.7 % (ref 16–70)
PLATELET # BLD: 210 TH/MM3 (ref 150–450)
POTASSIUM SERPL-SCNC: 3.6 MEQ/L (ref 3.5–5.1)
RBC # BLD AUTO: 2.76 MIL/MM3 (ref 4–5.3)
SODIUM SERPL-SCNC: 138 MEQ/L (ref 136–145)
WBC # BLD AUTO: 8.6 TH/MM3 (ref 4–11)

## 2017-08-29 RX ADMIN — Medication SCH MG: at 09:48

## 2017-08-29 RX ADMIN — LISINOPRIL SCH MG: 5 TABLET ORAL at 09:48

## 2017-08-29 RX ADMIN — CETIRIZINE HYDROCHLORIDE SCH MG: 10 TABLET, FILM COATED ORAL at 09:48

## 2017-08-29 RX ADMIN — FUROSEMIDE SCH MG: 20 TABLET ORAL at 09:49

## 2017-08-29 RX ADMIN — ATORVASTATIN CALCIUM SCH MG: 20 TABLET, FILM COATED ORAL at 01:27

## 2017-08-29 RX ADMIN — Medication SCH ML: at 09:00

## 2017-08-29 RX ADMIN — SERTRALINE HYDROCHLORIDE SCH MG: 50 TABLET, FILM COATED ORAL at 09:47

## 2017-08-29 RX ADMIN — ATORVASTATIN CALCIUM SCH MG: 20 TABLET, FILM COATED ORAL at 20:43

## 2017-08-29 RX ADMIN — PANTOPRAZOLE SODIUM SCH MG: 40 INJECTION, POWDER, FOR SOLUTION INTRAVENOUS at 20:43

## 2017-08-29 RX ADMIN — Medication SCH ML: at 20:44

## 2017-08-29 RX ADMIN — POTASSIUM CHLORIDE SCH MEQ: 20 TABLET, EXTENDED RELEASE ORAL at 09:50

## 2017-08-29 RX ADMIN — Medication SCH TAB: at 09:47

## 2017-08-29 RX ADMIN — PANTOPRAZOLE SODIUM SCH MG: 40 INJECTION, POWDER, FOR SOLUTION INTRAVENOUS at 09:51

## 2017-08-29 NOTE — EKG
Date Performed: 08/28/2017       Time Performed: 18:22:29

 

PTAGE:      84 years

 

EKG:      Sinus rhythm 

 

 ST DEVIATION AND MODERATE T-WAVE ABNORMALITY, CONSIDER ANTEROLATERAL ISCHEMIA ABNORMAL ECG

 

PREVIOUS TRACING       : 08/19/2017 06.32 Compared to prior tracing no significant change

 

DOCTOR:   Hannah Gabriel  Interpretating Date/Time  08/29/2017 13:16:22

## 2017-08-29 NOTE — HHI.PR
Subjective


Remarks


Pt still feeling quite weak today with ambulation. 


No active bleeding


No abd pain.





Objective


Vitals





Vital Signs








  Date Time  Temp Pulse Resp B/P (MAP) Pulse Ox O2 Delivery O2 Flow Rate FiO2


 


8/29/17 12:00 97.1 91 17 126/58 (80) 95   


 


8/29/17 08:00 95.4 93 17 126/71 (89) 96   


 


8/29/17 04:00 96.0 90 17 96/54 (68) 96   


 


8/29/17 00:00 97.0 91 17 115/69 (84) 98   


 


8/28/17 21:29 98.4 90 20 131/ 99   


 


8/28/17 21:01 98.4 96 18 134/64 98   


 


8/28/17 20:00 98.5 94 20 122/59 99   


 


8/28/17 19:46 98.5 95 20 117/54 95   


 


8/28/17 19:10     99 Nasal Cannula 2.00 


 


8/28/17 19:10 98.4 102 18 122/67 (85) 99 Room Air  


 


8/28/17 16:20 98.4 90 20 119/53 (75) 97 Room Air  


 


8/28/17 16:20  90 20  97 Room Air  


 


8/28/17 16:16 98.4 91 18 119/53 (75) 97   








Result Diagram:  


8/29/17 0702                                                                   

             8/29/17 0702





Other Results





 Laboratory Tests








Test


  8/28/17


16:37 8/28/17


17:37 8/29/17


07:02


 


White Blood Count 9.2 TH/MM3   8.6 TH/MM3 


 


Red Blood Count 2.16 MIL/MM3   2.76 MIL/MM3 


 


Hemoglobin 5.6 GM/DL   7.8 GM/DL 


 


Hematocrit 17.4 %   23.1 % 


 


Mean Corpuscular Volume 80.5 FL   83.6 FL 


 


Mean Corpuscular Hemoglobin 26.0 PG   28.4 PG 


 


Mean Corpuscular Hemoglobin


Concent 32.3 % 


  


  34.0 % 


 


 


Red Cell Distribution Width 19.9 %   18.4 % 


 


Platelet Count 252 TH/MM3   210 TH/MM3 


 


Mean Platelet Volume 8.7 FL   8.8 FL 


 


Neutrophils (%) (Auto) 82.3 %   69.7 % 


 


Lymphocytes (%) (Auto) 12.3 %   20.4 % 


 


Monocytes (%) (Auto) 4.9 %   7.9 % 


 


Eosinophils (%) (Auto) 0.1 %   1.5 % 


 


Basophils (%) (Auto) 0.4 %   0.5 % 


 


Neutrophils # (Auto) 7.6 TH/MM3   6.0 TH/MM3 


 


Lymphocytes # (Auto) 1.1 TH/MM3   1.8 TH/MM3 


 


Monocytes # (Auto) 0.5 TH/MM3   0.7 TH/MM3 


 


Eosinophils # (Auto) 0.0 TH/MM3   0.1 TH/MM3 


 


Basophils # (Auto) 0.0 TH/MM3   0.0 TH/MM3 


 


CBC Comment DIFF FINAL   DIFF FINAL 


 


Differential Comment     


 


Prothrombin Time 10.3 SEC   


 


Prothromb Time International


Ratio 0.9 RATIO 


  


  


 


 


Activated Partial


Thromboplast Time 22.6 SEC 


  


  


 


 


Blood Urea Nitrogen 45 MG/DL   48 MG/DL 


 


Creatinine 0.84 MG/DL   0.80 MG/DL 


 


Random Glucose 110 MG/DL   91 MG/DL 


 


Calcium Level 8.1 MG/DL   7.6 MG/DL 


 


Magnesium Level 2.2 MG/DL   


 


Sodium Level 136 MEQ/L   138 MEQ/L 


 


Potassium Level 4.2 MEQ/L   3.6 MEQ/L 


 


Chloride Level 103 MEQ/L   105 MEQ/L 


 


Carbon Dioxide Level 25.0 MEQ/L   23.3 MEQ/L 


 


Anion Gap 8 MEQ/L   10 MEQ/L 


 


Estimat Glomerular Filtration


Rate 65 ML/MIN 


  


  68 ML/MIN 


 


 


Iron Level 35 MCG/DL   


 


Total Iron Binding Capacity 416 MCG/DL   


 


Percent Iron Saturation 8.4 %   


 


Ferritin 20 NG/ML   


 


B-Type Natriuretic Peptide 128 PG/ML   


 


Urine Color  YELLOW  


 


Urine Turbidity  CLEAR  


 


Urine pH  5.5  


 


Urine Specific Gravity  1.021  


 


Urine Protein  NEG mg/dL  


 


Urine Glucose (UA)  NEG mg/dL  


 


Urine Ketones  NEG mg/dL  


 


Urine Occult Blood  SMALL  


 


Urine Nitrite  NEG  


 


Urine Bilirubin  NEG  


 


Urine Urobilinogen


  


  LESS THAN 2.0


MG/DL 


 


 


Urine Leukocyte Esterase  SMALL  


 


Urine RBC  1 /hpf  


 


Urine WBC  4 /hpf  


 


Urine Squamous Epithelial


Cells 


  <1 /hpf 


  


 


 


Urine Transitional Epithelial


Cells 


  1 /hpf 


  


 


 


Urine Bacteria  RARE /hpf  


 


Microscopic Urinalysis Comment


  


  CULT NOT


INDICATED 


 








Imaging





Last Impressions








Chest X-Ray 8/28/17 9629 Signed





Impressions: 





 Service Date/Time:  Monday, August 28, 2017 16:44 - CONCLUSION: No acute 





 disease.       Tanner Vann MD  FACR








Objective Remarks


General: NAD, AAOx3


Chest: CTA


Cardiac: Regular


Abd: +BS, soft ND/NT


Ext: No edema





A/P


Problem List:  


(1) Symptomatic anemia


ICD Codes:  D64.9 - Anemia, unspecified


Status:  Chronic


Plan:  


- Pt is an 83 y/o WF with iron deficiency anemia/GIB/SB angioectasia, severe 

aortic stenosis, hx of CHF, 


 HTN, and hyperlipidemia.  


- Pt has had ongoing issues with anemia and obscure GIB. 


- She was previously admitted in June 2017 and underwent Small bowel enteroscopy

/incomplete colonoscopy 


 (6/1/17) which noted thickened fold in the duodenum, otherwise normal 

enteroscopy and unfortunately the scope 


 was unable to be passed beyond the sigmoid 25 cm secondary to either twisted 

colon, spasm or external 


 compression, it was felt to not be safe to push the scope without seeing the 

lumen because of fear of perforation 


 so endoscopy was terminated. 


- As an outpt she had Capsule endoscopy (8/1/17) which noted multiple 

angiectasia with active bleeding and pt 


 was sent for antegrade single balloon enteroscopy (8/7/17) but unfortunately 

there was no active bleeding found


 and the enteroscopy was found to be normal except for a hiatal hernia. 


- Pts outpt labs had been stable with Hgb ranging between 9-10. Her last labs 

on 8/24 noted Hgb 9.3. 


- She reports that she developed increased SOB which started yesterday with 

increased weakness and has required


 the use of her walker. Pt reports that her stools are always dark as she takes 

iron supplements. 


- Her labs in the ED noted a drop in her H/H to 5.6/17.4. 


- Iron studies prior to transfusion noted Serum Fe 35, TIBC 416, %Sat 8.4, 

Ferritin 20


- Pt was transfused with 2 units of PRBCs with improvement in her Hgb to 7.8


- Pt to get another 2 units of PRBCs today


- Monitor H/H


- Await GI consult and Hematology consult


- Monitor closely for any signs of volume overload with transfusion


- Hold ASA/Plavix which were started during last admission


- Supportive care





- DVT prophylaxis with SCDs





(2) GI bleed


ICD Codes:  K92.2 - Gastrointestinal hemorrhage, unspecified


Status:  Resolved


Plan:  


- See above





(3) Aortic valve stenosis, severe


ICD Codes:  I35.0 - Nonrheumatic aortic (valve) stenosis


Status:  Acute


Plan:  


- Her last admission was from 8/18-8/21 for SOB and felt to have acute CHF 

symptoms related to 


 severe AVS.  Pt was diuresed with improvement. 


- 2D echocardiogram (8/19/17) which noted severe aortic valve stenosis and she 

was initially decompensated. 


- She was evaluated with a  C and consideration for AVR and this was 

performed on 8/21 and showed critical


 AS but nonobstructive CAD. 


- She was seen by CTS who did the initial evaluation for TAVR vs traditional 

AVR.


- It was decided to send the pt home on ACE/diuretic/ASA/Plavix and monitored 

for bleeding as she has a hx of previous GIB. 





(4) Hypertension


ICD Codes:  I10 - Essential (primary) hypertension


Status:  Chronic


Plan:  


- Home meds continued


- Monitor





(5) Hyperlipidemia


ICD Codes:  E78.5 - Hyperlipidemia, unspecified


Status:  Chronic


Plan:  


- Home meds continued





(6) CHF (congestive heart failure)


ICD Codes:  I50.9 - Heart failure, unspecified


Status:  Acute


Assessment and Plan


Patient examined.


Assessment and plan formulated with Kathryn Mcogwan PA-C.


I agree with the above.





Problem Qualifiers





(1) CHF (congestive heart failure):  








Kathryn Mcgowan Aug 29, 2017 15:18


Zia Puckett DO Aug 31, 2017 01:15

## 2017-08-29 NOTE — PD.CONS
HPI


History of Present Illness


This is a 84 year old lady with aortic stenosis, hx SB angioectasia who 

presented to ER with c/o SOB, weakness, "feeling like my anemia was getting bad 

again."  She was found to have hgb 5.6 on admission.  Per her daughter, she has 

been having black tarry stools for months.  She was started on a plavix and ASA 

combo 1 week ago with subsequent dropping of Hgb from 10 to 9.4 a few days later

, and then 2 d ago began feeling tired, SOB. She was so weak she fell 

yesterday.  She was hospitalized in ICU in May for SOB and weakness.  She had 

small bowel enteroscopy 6-1-17 and and incomplete colonoscopy, found thickened 

fold in duodenmu otherwise normal, limited colonoscopy d/t twisted colon or 

spasm or external compression.  She had capsule endoscopy 8-1-17 found multiple 

angioectasias with active bleeding and then went to [facility] for balloon 

enteroscopy but this was not done b/c no active bleeding was seen.  No prior hx 

GIB.  


 (Guillermina Holloway)





PFSH


Past Medical History


severe aortic stenosis


CHF


HTN


YANCY


Past Surgical History


hysterectomy


bilat knee replacement


2 x repair retinal detachment


 (Guillermina Holloway)


Coded Allergies:  


     morphine (Unverified  Allergy, Intermediate, 8/28/17)


Family History


none


Social History


occasional ETOH


no tobacco or illicit drug use


 (Guillermina Holloway)





Review of Systems


Constitutional:  DENIES: Fever, Weight loss


Eyes:  DENIES: Blurred vision


Ears, nose, mouth, throat:  DENIES: Throat pain


Respiratory:  DENIES: Hemoptysis


Gastrointestinal:  COMPLAINS OF: Black stools, DENIES: Abdominal pain, Bloody 

stools, Constipation, Diarrhea, Nausea, Vomiting, Hematemesis


Genitourinary:  DENIES: Hematuria


Musculoskeletal:  DENIES: Joint Swelling


Immunologic/allergic:  DENIES: Eczema


Neurologic:  DENIES: Headache


Psychiatric:  DENIES: Confusion (Guillermina Holloway)





GI Exam


Vitals I&O





Vital Signs








  Date Time  Temp Pulse Resp B/P (MAP) Pulse Ox O2 Delivery O2 Flow Rate FiO2


 


8/29/17 12:00 97.1 91 17 126/58 (80) 95   


 


8/29/17 08:00 95.4 93 17 126/71 (89) 96   


 


8/29/17 04:00 96.0 90 17 96/54 (68) 96   


 


8/29/17 00:00 97.0 91 17 115/69 (84) 98   


 


8/28/17 21:29 98.4 90 20 131/ 99   


 


8/28/17 21:01 98.4 96 18 134/64 98   


 


8/28/17 20:00 98.5 94 20 122/59 99   


 


8/28/17 19:46 98.5 95 20 117/54 95   


 


8/28/17 19:10     99 Nasal Cannula 2.00 


 


8/28/17 19:10 98.4 102 18 122/67 (85) 99 Room Air  














I/O      


 


 8/28/17 8/28/17 8/28/17 8/29/17 8/29/17 8/29/17





 06:59 14:59 22:59 06:59 14:59 22:59


 


Intake Total   520 ml 250 ml  


 


Balance   520 ml 250 ml  


 


      


 


Intake Packed Cells   250 ml 250 ml  


 


Blood Product IV Normal Saline Flush   270 ml   








Imaging





Last Impressions








Chest X-Ray 8/28/17 1629 Signed





Impressions: 





 Service Date/Time:  Monday, August 28, 2017 16:44 - CONCLUSION: No acute 





 disease.       Tanner Vann MD  FACR








Laboratory











Test


  8/28/17


17:37 8/29/17


07:02


 


Urine Color YELLOW  


 


Urine Turbidity CLEAR  


 


Urine pH 5.5  


 


Urine Specific Gravity 1.021  


 


Urine Protein NEG mg/dL  


 


Urine Glucose (UA) NEG mg/dL  


 


Urine Ketones NEG mg/dL  


 


Urine Occult Blood SMALL  


 


Urine Nitrite NEG  


 


Urine Bilirubin NEG  


 


Urine Urobilinogen


  LESS THAN 2.0


MG/DL 


 


 


Urine Leukocyte Esterase SMALL  


 


Urine RBC 1 /hpf  


 


Urine WBC 4 /hpf  


 


Urine Squamous Epithelial


Cells <1 /hpf 


  


 


 


Urine Transitional Epithelial


Cells 1 /hpf 


  


 


 


Urine Bacteria RARE /hpf  


 


Microscopic Urinalysis Comment


  CULT NOT


INDICATED 


 


 


White Blood Count  8.6 TH/MM3 


 


Red Blood Count  2.76 MIL/MM3 


 


Hemoglobin  7.8 GM/DL 


 


Hematocrit  23.1 % 


 


Mean Corpuscular Volume  83.6 FL 


 


Mean Corpuscular Hemoglobin  28.4 PG 


 


Mean Corpuscular Hemoglobin


Concent 


  34.0 % 


 


 


Red Cell Distribution Width  18.4 % 


 


Platelet Count  210 TH/MM3 


 


Mean Platelet Volume  8.8 FL 


 


Neutrophils (%) (Auto)  69.7 % 


 


Lymphocytes (%) (Auto)  20.4 % 


 


Monocytes (%) (Auto)  7.9 % 


 


Eosinophils (%) (Auto)  1.5 % 


 


Basophils (%) (Auto)  0.5 % 


 


Neutrophils # (Auto)  6.0 TH/MM3 


 


Lymphocytes # (Auto)  1.8 TH/MM3 


 


Monocytes # (Auto)  0.7 TH/MM3 


 


Eosinophils # (Auto)  0.1 TH/MM3 


 


Basophils # (Auto)  0.0 TH/MM3 


 


CBC Comment  DIFF FINAL 


 


Differential Comment   


 


Blood Urea Nitrogen  48 MG/DL 


 


Creatinine  0.80 MG/DL 


 


Random Glucose  91 MG/DL 


 


Calcium Level  7.6 MG/DL 


 


Sodium Level  138 MEQ/L 


 


Potassium Level  3.6 MEQ/L 


 


Chloride Level  105 MEQ/L 


 


Carbon Dioxide Level  23.3 MEQ/L 


 


Anion Gap  10 MEQ/L 


 


Estimat Glomerular Filtration


Rate 


  68 ML/MIN 


 








Physical Examination


HEENT: PERRL; normocephalic; atraumatic; no jaundice. 


CHEST:  CTA


CARDIAC:  RRR


ABDOMEN:  Soft, nondistended, nontender; no hepatosplenomegaly; bowel sounds 

are present in all four quadrants.


EXTREMITIES: No clubbing, cyanosis, or edema.


SKIN: PALE; no rash; no jaundice.


CNS:  No focal deficits; alert and oriented times three.


 (Guillermina Holloway)





Assessment and Plan


Plan


ASSESSMENT


- anemia - 5.6 on admission.  Was 10 a week ago when started on plavix and ASA.

  low iron.  6-1-17 SB enteroscopy/colonoscopy --> thickened fold duodenum, 

limtied d/t twisted


   colon or spasm or ext compression.  8-1-17 capsule endoscopy foudn multiple 

angioectasias and active bleeding but subsequent balloon enteroscopy not done b/

c no active 


   bleed found.  She is s/p 2x PRBC and Hgb up to 7.8.  


- black tarry stool - for last 2-3 months.  endoscopy as above.  denies NSAID 

use.  has been on plavix and ASA





PLAN


- monitor HH


- transfuse as needed


- consider balloon enteroscopy


- await hematology consult


- supportive care


This pt seen by myself and Dr Desai and this note is written on his behalf 


 (Guillermina Holloway)


Physician Comments


As above, the patient is known to our service, limited options now, 

embolization for active significant bleeding vs double balloon endoscopy for 

chronic intermittent bleeding, will consider transfer to a center with double 

balloon facility.


Will follow up with you for now, transfuse as needed, follow up clinically.


 (Amira Desai MD)











Guillermina Holloway Aug 29, 2017 16:54


Amira Desai MD Aug 29, 2017 20:36

## 2017-08-30 VITALS
DIASTOLIC BLOOD PRESSURE: 56 MMHG | TEMPERATURE: 95.1 F | OXYGEN SATURATION: 99 % | SYSTOLIC BLOOD PRESSURE: 97 MMHG | RESPIRATION RATE: 17 BRPM | HEART RATE: 94 BPM

## 2017-08-30 VITALS
TEMPERATURE: 98.3 F | OXYGEN SATURATION: 97 % | HEART RATE: 88 BPM | SYSTOLIC BLOOD PRESSURE: 98 MMHG | RESPIRATION RATE: 18 BRPM | DIASTOLIC BLOOD PRESSURE: 51 MMHG

## 2017-08-30 VITALS
HEART RATE: 92 BPM | DIASTOLIC BLOOD PRESSURE: 64 MMHG | TEMPERATURE: 97.6 F | RESPIRATION RATE: 18 BRPM | OXYGEN SATURATION: 99 % | SYSTOLIC BLOOD PRESSURE: 106 MMHG

## 2017-08-30 VITALS
RESPIRATION RATE: 18 BRPM | HEART RATE: 85 BPM | DIASTOLIC BLOOD PRESSURE: 50 MMHG | TEMPERATURE: 97.1 F | SYSTOLIC BLOOD PRESSURE: 99 MMHG | OXYGEN SATURATION: 95 %

## 2017-08-30 VITALS
SYSTOLIC BLOOD PRESSURE: 90 MMHG | OXYGEN SATURATION: 94 % | DIASTOLIC BLOOD PRESSURE: 51 MMHG | HEART RATE: 88 BPM | TEMPERATURE: 97.6 F | RESPIRATION RATE: 17 BRPM

## 2017-08-30 VITALS
SYSTOLIC BLOOD PRESSURE: 101 MMHG | OXYGEN SATURATION: 98 % | HEART RATE: 85 BPM | TEMPERATURE: 97.9 F | RESPIRATION RATE: 17 BRPM | DIASTOLIC BLOOD PRESSURE: 53 MMHG

## 2017-08-30 VITALS
OXYGEN SATURATION: 100 % | TEMPERATURE: 97 F | DIASTOLIC BLOOD PRESSURE: 86 MMHG | SYSTOLIC BLOOD PRESSURE: 116 MMHG | HEART RATE: 93 BPM | RESPIRATION RATE: 17 BRPM

## 2017-08-30 LAB
ACANTHOCYTES BLD QL SMEAR: (no result)
ANION GAP SERPL CALC-SCNC: 9 MEQ/L (ref 5–15)
BASOPHILS # BLD AUTO: 0 TH/MM3 (ref 0–0.2)
BASOPHILS NFR BLD: 0.4 % (ref 0–2)
BILIRUB INDIRECT SERPL-MCNC: 0.9 MG/DL (ref 0–0.8)
BILIRUB SERPL-MCNC: 1.2 MG/DL (ref 0.2–1)
BUN SERPL-MCNC: 41 MG/DL (ref 7–18)
CHLORIDE SERPL-SCNC: 108 MEQ/L (ref 98–107)
EOSINOPHIL # BLD: 0.2 TH/MM3 (ref 0–0.4)
EOSINOPHIL NFR BLD: 1.8 % (ref 0–4)
ERYTHROCYTE [DISTWIDTH] IN BLOOD BY AUTOMATED COUNT: 23.6 % (ref 11.6–17.2)
GFR SERPLBLD BASED ON 1.73 SQ M-ARVRAT: 80 ML/MIN (ref 89–?)
HCO3 BLD-SCNC: 23 MEQ/L (ref 21–32)
HCT VFR BLD CALC: 25.4 % (ref 35–46)
HEMO FLAGS: (no result)
LDH SERPL-CCNC: 155 U/L (ref 84–246)
LYMPHOCYTES # BLD AUTO: 1.6 TH/MM3 (ref 1–4.8)
LYMPHOCYTES NFR BLD AUTO: 16.6 % (ref 9–44)
MAGNESIUM SERPL-MCNC: 2.2 MG/DL (ref 1.5–2.5)
MCH RBC QN AUTO: 27.7 PG (ref 27–34)
MCHC RBC AUTO-ENTMCNC: 34.5 % (ref 32–36)
MCV RBC AUTO: 80.3 FL (ref 80–100)
MONOCYTES NFR BLD: 7.7 % (ref 0–8)
NEUTROPHILS # BLD AUTO: 7.1 TH/MM3 (ref 1.8–7.7)
NEUTROPHILS NFR BLD AUTO: 73.5 % (ref 16–70)
OVALOCYTES BLD QL SMEAR: (no result)
PLAT MORPH BLD: (no result)
PLATELET # BLD: 170 TH/MM3 (ref 150–450)
PLATELET BLD QL SMEAR: NORMAL
POLYCHROMASIA BLD QL SMEAR: 2 % (ref 0–1.9)
POTASSIUM SERPL-SCNC: 3.4 MEQ/L (ref 3.5–5.1)
RBC # BLD AUTO: 3.16 MIL/MM3 (ref 4–5.3)
SCAN/DIFF: (no result)
SODIUM SERPL-SCNC: 140 MEQ/L (ref 136–145)
VIT B12 SERPL-MCNC: 543 PG/ML (ref 193–986)
WBC # BLD AUTO: 9.7 TH/MM3 (ref 4–11)

## 2017-08-30 RX ADMIN — LISINOPRIL SCH MG: 5 TABLET ORAL at 09:01

## 2017-08-30 RX ADMIN — ATORVASTATIN CALCIUM SCH MG: 20 TABLET, FILM COATED ORAL at 20:36

## 2017-08-30 RX ADMIN — FUROSEMIDE SCH MG: 20 TABLET ORAL at 09:00

## 2017-08-30 RX ADMIN — SERTRALINE HYDROCHLORIDE SCH MG: 50 TABLET, FILM COATED ORAL at 09:00

## 2017-08-30 RX ADMIN — PANTOPRAZOLE SODIUM SCH MG: 40 INJECTION, POWDER, FOR SOLUTION INTRAVENOUS at 20:38

## 2017-08-30 RX ADMIN — Medication SCH ML: at 20:38

## 2017-08-30 RX ADMIN — CETIRIZINE HYDROCHLORIDE SCH MG: 10 TABLET, FILM COATED ORAL at 09:00

## 2017-08-30 RX ADMIN — ACETAMINOPHEN PRN MG: 325 TABLET ORAL at 09:06

## 2017-08-30 RX ADMIN — SODIUM CHLORIDE SCH MLS/HR: 900 INJECTION, SOLUTION INTRAVENOUS at 20:36

## 2017-08-30 RX ADMIN — Medication SCH TAB: at 09:01

## 2017-08-30 RX ADMIN — PANTOPRAZOLE SODIUM SCH MG: 40 INJECTION, POWDER, FOR SOLUTION INTRAVENOUS at 09:08

## 2017-08-30 RX ADMIN — POTASSIUM CHLORIDE SCH MEQ: 20 TABLET, EXTENDED RELEASE ORAL at 09:01

## 2017-08-30 RX ADMIN — Medication SCH ML: at 09:08

## 2017-08-30 RX ADMIN — Medication SCH MG: at 09:01

## 2017-08-30 NOTE — RADRPT
EXAM DATE/TIME:  08/30/2017 06:22 

 

[facility] COMPARISON:     

No previous studies available for comparison.

 

                     

INDICATIONS :     

Short of breath.

                     

 

MEDICAL HISTORY :     

Congestive heart failure.  Hypertension  Gastroesophageal reflux disease.      

 

SURGICAL HISTORY :     

Hysterectomy.   

 

ENCOUNTER:     

Subsequent                                        

 

ACUITY:     

2 weeks      

 

PAIN SCORE:     

Non-responsive.

 

LOCATION:     

Bilateral chest 

 

FINDINGS:     

Cardiomegaly. Aortic calcification. Clear lungs. Osseous structures are intact.

 

CONCLUSION:     No acute disease.  

 

 

 

 Madhu Frank MD on August 30, 2017 at 7:14           

Board Certified Radiologist.

 This report was verified electronically.

## 2017-08-30 NOTE — PD.ONC.PN
Subjective


Subjective


Remarks


Pt anxious to find out where she's bleeding from


"How are we going to fix it?"


Denies dizziness or SOB.





Objective


Data











  Date Time  Temp Pulse Resp B/P (MAP) Pulse Ox O2 Delivery O2 Flow Rate FiO2


 


8/30/17 16:00 97.6 88 17 90/51 (64) 94   


 


8/30/17 12:00 97.9 85 17 101/53 (69) 98   


 


8/30/17 08:00 95.1 94 17 97/56 (70) 99   


 


8/30/17 04:15 97.0 93 17 116/86 100   


 


8/30/17 01:20 98.3 88 18 98/51 97   


 


8/30/17 01:20 98.3 88 18 98/51 (67) 97   


 


8/30/17 00:35 97.6 92 18 106/64 (78) 99   


 


8/30/17 00:35 97.6 92 18 106/64 (78) 99   


 


8/29/17 22:33     97   


 


8/29/17 20:50 98.0 91 18 100/55 96   


 


8/29/17 20:50 98.1 91 18 100/55 (70) 96   


 


8/29/17 20:05 97.8 93 17 109/56 (73) 97   


 


8/29/17 20:05 97.8 93 17 109/56 (73) 97   














 8/30/17 8/30/17 8/30/17





 07:00 15:00 23:00


 


Intake Total 520 ml 840 ml 


 


Balance 520 ml 840 ml 








Result Diagram:  


8/30/17 0715                                                                   

             8/30/17 0715





Laboratory Results





Laboratory Tests








Test


  8/30/17


07:15


 


White Blood Count 9.7 TH/MM3 


 


Red Blood Count 3.16 MIL/MM3 


 


Hemoglobin 8.8 GM/DL 


 


Hematocrit 25.4 % 


 


Mean Corpuscular Volume 80.3 FL 


 


Mean Corpuscular Hemoglobin 27.7 PG 


 


Mean Corpuscular Hemoglobin


Concent 34.5 % 


 


 


Red Cell Distribution Width 23.6 % 


 


Platelet Count 170 TH/MM3 


 


Mean Platelet Volume 8.6 FL 


 


Neutrophils (%) (Auto) 73.5 % 


 


Lymphocytes (%) (Auto) 16.6 % 


 


Monocytes (%) (Auto) 7.7 % 


 


Eosinophils (%) (Auto) 1.8 % 


 


Basophils (%) (Auto) 0.4 % 


 


Neutrophils # (Auto) 7.1 TH/MM3 


 


Lymphocytes # (Auto) 1.6 TH/MM3 


 


Monocytes # (Auto) 0.8 TH/MM3 


 


Eosinophils # (Auto) 0.2 TH/MM3 


 


Basophils # (Auto) 0.0 TH/MM3 


 


CBC Comment AUTO DIFF 


 


Differential Comment


  AUTO DIFF


CONFIRMED


 


Platelet Estimate NORMAL 


 


Platelet Morphology Comment ENLARGED 


 


Polychromasia 2.0 % 


 


Ovalocytes 1+ 


 


Acanthocytes OCC 


 


Haptoglobin 128 MG/DL 


 


Blood Urea Nitrogen 41 MG/DL 


 


Creatinine 0.70 MG/DL 


 


Random Glucose 97 MG/DL 


 


Calcium Level 7.6 MG/DL 


 


Magnesium Level 2.2 MG/DL 


 


Lactate Dehydrogenase 155 U/L 


 


Total Bilirubin 1.2 MG/DL 


 


Direct Bilirubin 0.3 MG/DL 


 


Sodium Level 140 MEQ/L 


 


Potassium Level 3.4 MEQ/L 


 


Chloride Level 108 MEQ/L 


 


Carbon Dioxide Level 23.0 MEQ/L 


 


Anion Gap 9 MEQ/L 


 


Estimat Glomerular Filtration


Rate 80 ML/MIN 


 


 


Indirect Bilirubin 0.9 MG/DL 


 


Vitamin B12 Level 543 PG/ML 


 


Folate


  GREATER THAN


20.0 NG/ML








Imaging Studies





Last 24 hours Impressions








Chest X-Ray 8/30/17 0600 Signed





Impressions: 





 Service Date/Time:  Wednesday, August 30, 2017 06:22 - CONCLUSION: No acute 





 disease.       Madhu Frank MD 











Administered Medications








 Medications


  (Trade)  Dose


 Ordered  Sig/Brigitte


 Route


 PRN Reason  Start Time


 Stop Time Status Last Admin


Dose Admin


 


 Sodium Chloride


  (NS Flush)  2 ml  BID


 IV FLUSH


   8/28/17 21:00


    8/30/17 09:08


 


 


 Acetaminophen


  (Tylenol)  650 mg  Q4H  PRN


 PO


 TEMP > 100.4  8/28/17 17:45


    8/30/17 09:06


 


 


 Alprazolam


  (Xanax)  0.25 mg  TID  PRN


 PO


 ANXIETY  8/28/17 17:45


    8/30/17 17:22


 


 


 Atorvastatin


 Calcium


  (Lipitor)  20 mg  HS


 PO


   8/28/17 21:00


    8/29/17 20:43


 


 


 Cetirizine HCl


  (ZyrTEC)  10 mg  DAILY


 PO


   8/29/17 09:00


    8/30/17 09:00


 


 


 Furosemide


  (Lasix)  20 mg  DAILY


 PO


   8/29/17 09:00


    8/30/17 09:00


 


 


 Lisinopril


  (Prinivil)  2.5 mg  DAILY


 PO


   8/29/17 09:00


    8/30/17 09:01


 


 


 Vit C/Vit E/Zinc/


 Copper/Lutein


  (Ocuvite)  1 tab  DAILY


 PO


   8/29/17 09:00


    8/30/17 09:01


 


 


 Polysaccharide


 Iron Complex


  (Nu-Iron)  150 mg  DAILY


 PO


   8/29/17 09:00


    8/30/17 09:01


 


 


 Potassium Chloride


  (KCl)  20 meq  DAILY


 PO


   8/29/17 09:00


    8/30/17 09:01


 


 


 Sertraline HCl


  (Zoloft)  150 mg  DAILY


 PO


   8/29/17 09:00


    8/30/17 09:00


 


 


 Pantoprazole


 Sodium


  (Protonix Inj)  40 mg  Q12H


 IV PUSH


   8/28/17 20:00


    8/30/17 09:08


 








Objective Remarks


GENERAL: Pale appearing elderly female resting in bed in no acute distress.


SKIN: Warm and dry.


HEAD: Normocephalic.


EYES: No scleral icterus. No injection or drainage. 


NECK: Supple, trachea midline. No JVD or lymphadenopathy.


CARDIOVASCULAR: Regular rate and rhythm without murmurs. 


RESPIRATORY: Breath sounds equal bilaterally. No accessory muscle use.


GASTROINTESTINAL: Abdomen soft, non-tender, nondistended. 


EXTREMITIES: No cyanosis, or edema. 


NEUROLOGICAL: No obvious focal deficit. Awake, alert, and oriented x3.





Assessment/Plan


Problem List:  


(1) Anemia


ICD Codes:  D64.9 - Anemia, unspecified


Status:  Acute


Plan:  -- Iron deficiency likely due to GI bleed


-- Microcytosis noted


-- Will transfuse iron sucrose x 3 days





(2) GI bleed


ICD Codes:  K92.2 - Gastrointestinal hemorrhage, unspecified


Status:  Resolved


Plan:  -- GI evaluation in June 2017; she was found to have thickened duodenum.


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


-- After that she had antegrade single enteroscopy that did not show any active 

bleeding


-- GI following





(3) Aortic valve stenosis, severe


ICD Codes:  I35.0 - Nonrheumatic aortic (valve) stenosis


Status:  Acute


Plan:  -- Echocardiogram on 8/19/17 showed severe aortic valve stenosis


-- The patient has been evaluated by cardiothoracic surgery





Assessment


83 y/o female with history of Valdez's esophagus and aortic stenosis presents 

to the ER with dark stools and SOB and found to have a hgb of 5.6.


Plan


1. Check B12, folate.


2. Evidence of iron deficiency; likely secondary to GI bleed.


3. Will  iron sucrose x 3 doses. 


4. Monitor CBC; transfuse PRBC's as needed to keep hgb greater than 8.0 or for 

any symptoms of severe anemia.


Attending Statement


The exam, history, and the medical decision-making described in the above note 

were completed with the assistance of the mid-level provider. I reviewed and 

agree with the findings presented.  I attest that I had a face-to-face 

encounter with the patient on the same day, and personally performed and 

documented my assessment and findings in the medical record


Anemia of iron deficiency


Will give iron sucrose


check b12/folate


d/w rn


o/n events reviewed











Elise Fernando Aug 30, 2017 18:26


Hamzah Newton MD Aug 30, 2017 22:53

## 2017-08-30 NOTE — HHI.GIFU
Subjective


Remarks


Pt resting in bed, daughter at bedside.  Awaiting iron transfusion.  Still with 

black stool. 


 (Guillermina Holloway)





Objective


Vitals I&O





Vital Signs








  Date Time  Temp Pulse Resp B/P (MAP) Pulse Ox O2 Delivery O2 Flow Rate FiO2


 


8/30/17 12:00 97.9 85 17 101/53 (69) 98   


 


8/30/17 08:00 95.1 94 17 97/56 (70) 99   


 


8/30/17 04:15 97.0 93 17 116/86 100   


 


8/30/17 01:20 98.3 88 18 98/51 97   


 


8/30/17 01:20 98.3 88 18 98/51 (67) 97   


 


8/30/17 00:35 97.6 92 18 106/64 (78) 99   


 


8/30/17 00:35 97.6 92 18 106/64 (78) 99   


 


8/29/17 22:33     97   


 


8/29/17 20:50 98.0 91 18 100/55 96   


 


8/29/17 20:50 98.1 91 18 100/55 (70) 96   


 


8/29/17 20:05 97.8 93 17 109/56 (73) 97   


 


8/29/17 20:05 97.8 93 17 109/56 (73) 97   














I/O      


 


 8/29/17 8/29/17 8/29/17 8/30/17 8/30/17 8/30/17





 07:00 15:00 23:00 07:00 15:00 23:00


 


Intake Total 250 ml  250 ml 520 ml 840 ml 


 


Balance 250 ml  250 ml 520 ml 840 ml 


 


      


 


Intake Oral   240 ml  840 ml 


 


Packed Cells 250 ml   500 ml  


 


Blood Product IV Normal Saline Flush   10 ml 20 ml  


 


# Voids   2  6 


 


# Bowel Movements   0  2 








Laboratory





Laboratory Tests








Test


  8/30/17


07:15


 


White Blood Count 9.7 


 


Red Blood Count 3.16 


 


Hemoglobin 8.8 


 


Hematocrit 25.4 


 


Mean Corpuscular Volume 80.3 


 


Mean Corpuscular Hemoglobin 27.7 


 


Mean Corpuscular Hemoglobin


Concent 34.5 


 


 


Red Cell Distribution Width 23.6 


 


Platelet Count 170 


 


Mean Platelet Volume 8.6 


 


Neutrophils (%) (Auto) 73.5 


 


Lymphocytes (%) (Auto) 16.6 


 


Monocytes (%) (Auto) 7.7 


 


Eosinophils (%) (Auto) 1.8 


 


Basophils (%) (Auto) 0.4 


 


Neutrophils # (Auto) 7.1 


 


Lymphocytes # (Auto) 1.6 


 


Monocytes # (Auto) 0.8 


 


Eosinophils # (Auto) 0.2 


 


Basophils # (Auto) 0.0 


 


CBC Comment AUTO DIFF 


 


Differential Comment


  AUTO DIFF


CONFIRMED


 


Platelet Estimate NORMAL 


 


Platelet Morphology Comment ENLARGED 


 


Polychromasia 2.0 


 


Ovalocytes 1+ 


 


Acanthocytes OCC 


 


Haptoglobin 128 


 


Blood Urea Nitrogen 41 


 


Creatinine 0.70 


 


Random Glucose 97 


 


Calcium Level 7.6 


 


Magnesium Level 2.2 


 


Lactate Dehydrogenase 155 


 


Total Bilirubin 1.2 


 


Direct Bilirubin 0.3 


 


Sodium Level 140 


 


Potassium Level 3.4 


 


Chloride Level 108 


 


Carbon Dioxide Level 23.0 


 


Anion Gap 9 


 


Estimat Glomerular Filtration


Rate 80 


 


 


Indirect Bilirubin 0.9 








Imaging





Last Impressions








Chest X-Ray 8/30/17 0600 Signed





Impressions: 





 Service Date/Time:  Wednesday, August 30, 2017 06:22 - CONCLUSION: No acute 





 disease.       Madhu Frank MD 








Physical Exam


HEENT: PERRL; normocephalic; atraumatic; no jaundice. 


CHEST:  CTA


CARDIAC:  RRR +murmur


ABDOMEN:  Soft, nondistended, nontender; no hepatosplenomegaly; bowel sounds 

are present in all four quadrants.


EXTREMITIES: No clubbing, cyanosis, or edema.


SKIN:  Normal; no rash; no jaundice.


CNS:  No focal deficits; alert and oriented times three.


 (Guillermina Holloway [facility])





Assessment and Plan


Plan


ASSESSMENT


- anemia - 5.6 on admission.  Was 10 a week ago when started on plavix and ASA.

  low iron.  6-1-17 SB enteroscopy/colonoscopy --> thickened fold duodenum, 

limtied d/t twisted


   colon or spasm or ext compression.  8-1-17 capsule endoscopy foudn multiple 

angioectasias and active bleeding but subsequent balloon enteroscopy not done b/

c no active 


   bleed found.  She is s/p 4 x PRBC and hgb 8.8.  hematology now following, 

iron transfusion pending


- black tarry stool - for last 2-3 months.  endoscopy as above.  denies NSAID 

use.  has been on plavix and ASA





PLAN


- monitor HH


- transfuse as needed


- consider double balloon enteroscopy at other facility, can be arranged as 

outpt


- await iron transfusions


- embolization for active significant bleeding  


- supportive care





This pt seen by myself and Dr Desai and this note is written on his behalf 


 (Guillermina Holloway)


Physician Comments


Plan as above, will follow up with you.


 (Amira Desai MD)











Guillermina Holloway Aug 30, 2017 16:58


Amira Desai MD Aug 30, 2017 22:51

## 2017-08-30 NOTE — MB
cc:

PREETHI THOMAS

****

 

 

DATE OF CONSULTATION

08/20/2017

 

DATE OF BIRTH

1932

 

REASON FOR CONSULTATION

Patient with acute anemia.

 

HISTORY OF PRESENT ILLNESS

Ms. Dillon is a pleasant 84-year-old female who has a history of severe

aortic stenosis, Valdez's esophagus, CHF, diverticulitis, hypertension,

hyperlipidemia, lumbar stenosis, macular degeneration, history of iron

deficiency and GI bleed who presents to the emergency department after she

became short of breath.  She was found to have acute CHF related to aortic

stenosis.  She had an echocardiogram in 08/19/2017 which revealed severe aortic

valve stenosis.  The patient has undergone left heart cath which confirmed

critical AS.  The patient has been evaluated by Cardiothoracic Surgery.  She

was then discharged from the hospital but then she presented again to the

emergency room with severe shortness of breath.

 

In the emergency department she was found to have a very low hemoglobin of 5.6.

There was microcytosis.  The patient has been given packed red blood cell

transfusion.  She has had GI evaluation in June of 2017; she had a small bowel

enteroscopy and also incomplete colonoscopy.  She was noted to have thickened

duodenum.  The scope was unable to pass beyond the sigmoid colon due twisted

colon, spasm or external compression.  She also underwent a capsule endoscopy

which showed multiple angioectasias with active bleeding.  She had an antegrade

single balloon enteroscopy which did not show any active bleeding.  Hematology

has been consulted to make further recommendations pertaining to her anemia.

 

REVIEW OF SYSTEMS

A comprehensive 14-point review of systems was completed which is positive for

severe dyspnea, lightheadedness, heart palpitations, black stools, dyspnea on

exertion.

 

 

 

PAST MEDICAL HISTORY

1. Iron deficiency anemia.

2. GI bleeding.

3. Angioectasias in the small bowel.

4. Severe aortic stenosis.

5. Valdez's esophagus.

6. CHF.

7. Diverticulosis.

8. Hypertension.

9. Hyperlipidemia.

10. Lumbar stenosis.

11. Macular degeneration.

12. Depression, anxiety.

 

PAST SURGICAL HISTORY

1. Antegrade sigmoid balloon enteroscopy.

2. Cardiac cath.

3. Capsule endoscopy.

4. Small bowel endoscopy.

5. Colonoscopy.

6. Cataract surgery.

7. Knee arthroscopic/arthroplasty.

8. VICKEY.

 

MEDICATIONS

1. Zyrtec 10 mg p.o. daily.

2. Lasix 20 mg p.o. daily.

3. Lisinopril 2.5 mg p.o. daily.

4. Sertraline 150 p.o. daily.

5. Atorvastatin 20 mg p.o. q.h.s.

6. Pantoprazole 40 mg q.12 hours IV.

7. Tylenol 650 mg p.o. q.4 hours p.r.n.

8. Zofran 4 mg IV q.6 hours p.r.n.

9. Restoril 15 mg p.o. q.h.s. p.r.n.

10. Naloxone 0.4 mg IV p.r.n.

11. Milk of magnesia 30 cc p.o. q.12 hours p.r.n.

12. Xanax 0.25 mg p.o. t.i.d. p.r.n.

 

ALLERGIES

SHE IS ALLERGIC TO MORPHINE.

 

FAMILY HISTORY

Reviewed and it is noncontributory to this admission.

 

SOCIAL HISTORY

Does not have a history of tobacco abuse.  Occasionally drinks alcohol.  She is

.

 

PHYSICAL EXAMINATION

VITAL SIGNS: Blood pressure is 109/56, pulse is a 90, temperature is 97.8, O2

sats are 97% on room air.

GENERAL:  Pale-appearing, weak elderly female in no apparent distress.

HEENT:  Pupils are equal, round, reactive to light.  Conjunctivae pale.  No

oral lesions.  No oral thrush.

NECK:  Supple.  No JVD, no bruits, no lymphadenopathy.

CHEST:  Clear to auscultation bilaterally.

CARDIAC:  S1-S2, tachycardiac.

ABDOMEN:  Soft, nontender, nondistended.  Bowel sounds are present.

EXTREMITIES:  Without any edema, erythema or cyanosis.

SKIN:  Without any petechiae, lesion or bruises.

NEURO:  No focal deficits.

PSYCHIATRIC:  Mood and affect is appropriate.

LYMPH NODE EXAM:  No cervical, epitrochlear, axillary, inguinal or

supraclavicular lymphadenopathy on exam.

 

LABORATORY DATA

WBC is 8.6, hemoglobin is 7.8, MCV is 83.6, platelet count is 210.

Serum chemistries show a sodium of 138, potassium 3.6, chloride 105, CO2 23.3,

anion gap 10, BUN 48, creatinine 0.8, GFR 68, calcium is 7.6.

Iron is 35, TIBC is 416, % saturation is 8.4, ferritin is 20.  BNP is 128.

 

IMAGING STUDIES

Chest x-ray Was reviewed and does not show any acute cardiopulmonary disease.

 

 

ASSESSMENT AND PLAN

This is an 84-year-old female who has a past medical history of severe aortic

stenosis, hypertension, hyperlipidemia, iron deficiency anemia, history of GI

bleed who presents to the emergency department with acute dyspnea and was found

to have severe anemia.

1. Severe anemia which appears to be secondary to iron deficiency with a

     history of GI bleeding.  She has been transfused 3 units of packed red

     blood cells.  I have reviewed her iron studies.  Her iron levels are low.

     She had microcytosis.  She has symptomatic anemia.  I agree with blood

     transfusions.  She will benefit from an iron infusion.  We will check LDH

     and haptoglobin.  Check B12 and Folate levels. We have also checked 
bilirubin fractions.  She has

     severe aortic stenosis which can cause hemolysis and can contribute to

     anemia.  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 
form angiodysplasia in the presence of Aortic stenosis 

2. Severe aortic stenosis.  Evaluation per Cardiothoracic Surgery.

 

Thank you for allowing me to participate in the care of this patient.  I will

continue to follow this patient along.



 

                              _________________________________

                              MD MANOHAR Lay/DIPTI

D:  8/30/2017/1:18 AM

T:  8/30/2017/6:21 AM

Visit #:  [id number]

Job #:  [id number]

CASIMIRO

## 2017-08-30 NOTE — HHI.PR
Subjective


Remarks


anxious. no bleeding





Objective


Vitals


heart reg/jenni


lung cta


abd s/nt


ext no edema


Vital Signs








  Date Time  Temp Pulse Resp B/P (MAP) Pulse Ox O2 Delivery O2 Flow Rate FiO2


 


8/30/17 04:15 97.0 93 17 116/86 100   


 


8/30/17 01:20 98.3 88 18 98/51 97   


 


8/30/17 01:20 98.3 88 18 98/51 (67) 97   


 


8/30/17 00:35 97.6 92 18 106/64 (78) 99   


 


8/30/17 00:35 97.6 92 18 106/64 (78) 99   


 


8/29/17 22:33     97   


 


8/29/17 20:50 98.0 91 18 100/55 96   


 


8/29/17 20:50 98.1 91 18 100/55 (70) 96   


 


8/29/17 20:05 97.8 93 17 109/56 (73) 97   


 


8/29/17 20:05 97.8 93 17 109/56 (73) 97   


 


8/29/17 16:00 97.4 90 17 107/54 (71) 97   


 


8/29/17 12:00 97.1 91 17 126/58 (80) 95   














 8/30/17 8/30/17 8/31/17





 15:00 23:00 07:00


 


Intake Total 240 ml  


 


Balance 240 ml  


 


   


 


Intake Oral 240 ml  


 


# Voids 4  


 


# Bowel Movements 0  








Result Diagram:  


8/30/17 0715                                                                   

             8/30/17 0715





Imaging





Last Impressions








Chest X-Ray 8/28/17 1629 Signed





Impressions: 





 Service Date/Time:  Monday, August 28, 2017 16:44 - CONCLUSION: No acute 





 disease.       Tanner Vann MD  FACR











A/P


Problem List:  


(1) Symptomatic anemia


ICD Codes:  D64.9 - Anemia, unspecified


Status:  Chronic


Plan:  - Pt is an 83 y/o WF with iron deficiency anemia/GIB/SB angioectasia, 

severe aortic stenosis, hx of CHF, 


 HTN, and hyperlipidemia.  


- Pt has had ongoing issues with anemia and obscure GIB. 


- She was previously admitted in June 2017 and underwent Small bowel enteroscopy

/incomplete colonoscopy 


 (6/1/17) which noted thickened fold in the duodenum, otherwise normal 

enteroscopy and unfortunately the scope 


 was unable to be passed beyond the sigmoid 25 cm secondary to either twisted 

colon, spasm or external 


 compression, it was felt to not be safe to push the scope without seeing the 

lumen because of fear of perforation 


 so endoscopy was terminated. 


- As an outpt she had Capsule endoscopy (8/1/17) which noted multiple 

angiectasia with active bleeding and pt 


 was sent for antegrade single balloon enteroscopy (8/7/17) but unfortunately 

there was no active bleeding found


 and the enteroscopy was found to be normal except for a hiatal hernia. 


- Pts outpt labs had been stable with Hgb ranging between 9-10. Her last labs 

on 8/24 noted Hgb 9.3. 


- She reports that she developed increased SOB  with increased weakness and has 

required


 the use of her walker. Pt reports that her stools are always dark as she takes 

iron supplements. 


- Her labs in the ED noted a drop in her H/H to 5.6/17.4. 


- Iron studies prior to transfusion noted Serum Fe 35, TIBC 416, %Sat 8.4, 

Ferritin 20


- Pt was transfused with 4 units of PRBCs with improvement 


- Monitor closely for any signs of volume overload with transfusion


- Hold ASA/Plavix which were started during last admission for trial before 

TAVR 


- Supportive care


- DVT prophylaxis with SCDs





GI and Hematology following. I will notify her cardiologist. 





(2) GI bleed


ICD Codes:  K92.2 - Gastrointestinal hemorrhage, unspecified


Status:  Resolved


Plan:  


- See above





(3) Aortic valve stenosis, severe


ICD Codes:  I35.0 - Nonrheumatic aortic (valve) stenosis


Status:  Acute


Plan:  


- Her last admission was from 8/18-8/21 for SOB and felt to have acute CHF 

symptoms related to 


 severe AVS.  Pt was diuresed with improvement. 


- 2D echocardiogram (8/19/17) which noted severe aortic valve stenosis and she 

was initially decompensated. 


- She was evaluated with a  LHC and consideration for AVR and this was 

performed on 8/21 and showed critical


 AS but nonobstructive CAD. 


- She was seen by CTS who did the initial evaluation for TAVR vs traditional 

AVR.


- It was decided to send the pt home on ACE/diuretic/ASA/Plavix and monitored 

for bleeding as she has a hx of previous GIB. 





(4) Hypertension


ICD Codes:  I10 - Essential (primary) hypertension


Status:  Chronic


Plan:  


- Home meds continued


- Monitor





(5) Hyperlipidemia


ICD Codes:  E78.5 - Hyperlipidemia, unspecified


Status:  Chronic


Plan:  


- Home meds continued





(6) CHF (congestive heart failure)


ICD Codes:  I50.9 - Heart failure, unspecified


Status:  Acute





Problem Qualifiers





(1) CHF (congestive heart failure):  








Jaxson Rose MD Aug 30, 2017 08:53

## 2017-08-31 VITALS
DIASTOLIC BLOOD PRESSURE: 60 MMHG | TEMPERATURE: 97.6 F | SYSTOLIC BLOOD PRESSURE: 106 MMHG | OXYGEN SATURATION: 96 % | RESPIRATION RATE: 16 BRPM | HEART RATE: 86 BPM

## 2017-08-31 VITALS — HEART RATE: 90 BPM

## 2017-08-31 VITALS
SYSTOLIC BLOOD PRESSURE: 97 MMHG | OXYGEN SATURATION: 96 % | RESPIRATION RATE: 17 BRPM | DIASTOLIC BLOOD PRESSURE: 48 MMHG | HEART RATE: 87 BPM | TEMPERATURE: 97.9 F

## 2017-08-31 VITALS
RESPIRATION RATE: 17 BRPM | DIASTOLIC BLOOD PRESSURE: 54 MMHG | HEART RATE: 92 BPM | OXYGEN SATURATION: 96 % | SYSTOLIC BLOOD PRESSURE: 99 MMHG | TEMPERATURE: 98.8 F

## 2017-08-31 VITALS
DIASTOLIC BLOOD PRESSURE: 53 MMHG | TEMPERATURE: 97 F | HEART RATE: 72 BPM | RESPIRATION RATE: 18 BRPM | SYSTOLIC BLOOD PRESSURE: 104 MMHG | OXYGEN SATURATION: 96 %

## 2017-08-31 VITALS
OXYGEN SATURATION: 95 % | DIASTOLIC BLOOD PRESSURE: 52 MMHG | HEART RATE: 92 BPM | TEMPERATURE: 98.2 F | SYSTOLIC BLOOD PRESSURE: 94 MMHG | RESPIRATION RATE: 17 BRPM

## 2017-08-31 VITALS
DIASTOLIC BLOOD PRESSURE: 55 MMHG | SYSTOLIC BLOOD PRESSURE: 98 MMHG | TEMPERATURE: 96.8 F | HEART RATE: 80 BPM | OXYGEN SATURATION: 97 % | RESPIRATION RATE: 18 BRPM

## 2017-08-31 VITALS
TEMPERATURE: 97 F | RESPIRATION RATE: 17 BRPM | OXYGEN SATURATION: 96 % | HEART RATE: 84 BPM | DIASTOLIC BLOOD PRESSURE: 50 MMHG | SYSTOLIC BLOOD PRESSURE: 90 MMHG

## 2017-08-31 LAB
BASOPHILS # BLD AUTO: 0 TH/MM3 (ref 0–0.2)
BASOPHILS NFR BLD: 0.5 % (ref 0–2)
EOSINOPHIL # BLD: 0.2 TH/MM3 (ref 0–0.4)
EOSINOPHIL NFR BLD: 2.3 % (ref 0–4)
ERYTHROCYTE [DISTWIDTH] IN BLOOD BY AUTOMATED COUNT: 23.7 % (ref 11.6–17.2)
HCT VFR BLD CALC: 22.1 % (ref 35–46)
HEMO FLAGS: (no result)
LYMPHOCYTES # BLD AUTO: 1.5 TH/MM3 (ref 1–4.8)
LYMPHOCYTES NFR BLD AUTO: 14.6 % (ref 9–44)
MCH RBC QN AUTO: 27.5 PG (ref 27–34)
MCHC RBC AUTO-ENTMCNC: 32.7 % (ref 32–36)
MCV RBC AUTO: 84 FL (ref 80–100)
MONOCYTES NFR BLD: 5.1 % (ref 0–8)
NEUTROPHILS # BLD AUTO: 7.9 TH/MM3 (ref 1.8–7.7)
NEUTROPHILS NFR BLD AUTO: 77.5 % (ref 16–70)
PLATELET # BLD: 198 TH/MM3 (ref 150–450)
RBC # BLD AUTO: 2.62 MIL/MM3 (ref 4–5.3)
SCAN/DIFF: (no result)
WBC # BLD AUTO: 10.2 TH/MM3 (ref 4–11)

## 2017-08-31 RX ADMIN — POTASSIUM CHLORIDE SCH MEQ: 20 TABLET, EXTENDED RELEASE ORAL at 09:19

## 2017-08-31 RX ADMIN — ACETAMINOPHEN PRN MG: 325 TABLET ORAL at 15:24

## 2017-08-31 RX ADMIN — PANTOPRAZOLE SODIUM SCH MG: 40 INJECTION, POWDER, FOR SOLUTION INTRAVENOUS at 09:20

## 2017-08-31 RX ADMIN — SERTRALINE HYDROCHLORIDE SCH MG: 50 TABLET, FILM COATED ORAL at 09:18

## 2017-08-31 RX ADMIN — Medication SCH TAB: at 09:18

## 2017-08-31 RX ADMIN — LISINOPRIL SCH MG: 5 TABLET ORAL at 09:00

## 2017-08-31 RX ADMIN — Medication SCH ML: at 09:20

## 2017-08-31 RX ADMIN — Medication SCH ML: at 20:55

## 2017-08-31 RX ADMIN — CETIRIZINE HYDROCHLORIDE SCH MG: 10 TABLET, FILM COATED ORAL at 09:19

## 2017-08-31 RX ADMIN — ATORVASTATIN CALCIUM SCH MG: 20 TABLET, FILM COATED ORAL at 20:55

## 2017-08-31 RX ADMIN — FUROSEMIDE SCH MG: 20 TABLET ORAL at 09:00

## 2017-08-31 RX ADMIN — PANTOPRAZOLE SODIUM SCH MG: 40 INJECTION, POWDER, FOR SOLUTION INTRAVENOUS at 20:55

## 2017-08-31 RX ADMIN — SODIUM CHLORIDE SCH MLS/HR: 900 INJECTION, SOLUTION INTRAVENOUS at 09:19

## 2017-08-31 RX ADMIN — Medication SCH MG: at 09:19

## 2017-08-31 NOTE — MB
cc:

VERONICA YOUSIF

****

 

 

DATE OF CONSULTATION:

08/31/2017

 

REASON FOR CONSULTATION:

Aortic stenosis.

 

HISTORY OF PRESENT ILLNESS:

Lisa is a 84-year-old female.  She has known severe aortic stenosis and in

addition a small bowel angiectasia.  We evaluated her recently in the hospital

due to progressive shortness of breath, anemia.  She has had extensive workup

for GI bleed but did not undergo any cauterization due to no active bleeding.

 

We had discuss consideration of a transaortic percutaneous valve replacement,

but she would require aspirin and Plavix postprocedurally.

 

Her Hemoglobin had stabilized and was cleared by GI we initiate antiplatelet

therapy to see how she would tolerate that and possible anticipation of

undergoing a percutaneous valve replacement.  Obviously she presented now with

progressive shortness of breath and weakness and found to be severely anemic

due to the GI bleed.  There is discussion with the gastrointestinal hematology

for further recommendations from that perspective. We were asked to get

involved for any recommendations related to her aortic valve.

 

PAST MEDICAL HISTORY

1. Severe aortic stenosis.

2. Congestive heart terribly

3. Hypertension nightly

 

ALLERGIES

MORPHINE

 

FAMILY HISTORY

None

 

SOCIAL HISTORY

Denies any tobacco or drug use.  Occasional alcohol use.

 

REVIEW OF SYSTEMS

12-point review of systems were performed, negative unless otherwise noted in

history of present illness.

 

FAMILY HISTORY:

Denies any family history of coronary disease and cardiac death.

 

REVIEW OF SYSTEMS

12-point review of some was performed unless otherwise noted is present

illness.

 

PHYSICAL EXAMINATION

VITAL SIGNS: Temperature 97, pulse 85, blood pressure 104/53 mmHg.

IN GENERAL:  Alert and oriented times three. No acute distress.

HEAD, EYES, EARS, NOSE, AND THROAT:  Exam shows pupils reactive to light, and

accommodation, the extraocular muscles intact, elevation in venous distension.

No thyromegaly or lymphadenopathy.  No carotid bruits.

LUNGS: Clear auscultation bilaterally.

CARDIOVASCULAR: Regular rate and rhythm without rubs or gallops.  3/6 crescendo

decrescendo murmur in the cardiovascular exam.

ABDOMEN: The abdomen is nontender, nondistended.  Good bowel sounds.  No

hepatosplenomegaly.

EXTREMITIES:  The Extremities showed no clubbing, cyanosis or edema.  Good

peripheral pulses.

NEUROLOGIC: Cranial nerves intact.

 

LABORATORY FINDINGS:

WBC 9.7, hemoglobin 8.8, hematocrit 75.4, platelet count 170, INR of 0.9,

sodium 140, potassium 3.4, BUN is 41, creatinine 0.7.

 

ASSESSMENT

Severe aortic stenosis.

GI bleed

 

PLAN

Right now she is fairly well-compensated from a cardiac perspective, she has

severe aortic stenosis which will require intervention.  Unfortunately due to

her GI bleed, anemia were not able to move forward.

 

Will have to discuss with GI for what their plan will be, we need to be able to

administer antiplatelet therapy.  Posterior valve replacement and until she

shows stability from that perspective will be unable to proceed from a cardiac

procedural standpoint.

 

 

 

 

 

 

 

 

There is some discussion of either trans for outpatient double enteroscopy.

She is currently hemodynamically stable.  Hemoglobin is up to 8.8.  She will

need outpatient followup once she is discharged.  Call with any further

questions.  When she was discharged we will then evaluate her candidacy for

transesophageal echocardiogram.

 

 

 

                              _________________________________

                              MD LARRY Agrawal/lexy

D:  8/31/2017/12:37 PM

T:  8/31/2017/5:19 PM

Visit #:  [id number]

Job #:  [id number]

## 2017-08-31 NOTE — HHI.PR
Subjective


Remarks


Patient frustrated regarding the possibility of further GI workup at tertiary 

care center for double-balloon enteroscopy


Patient reports continued black tarry stools but no bright red blood- also 

patient on iron PO and IV


Denies abdominal pain, shortness of breath, chest pain, feeling lightheaded or 

dizzy





Objective


Vitals





Vital Signs








  Date Time  Temp Pulse Resp B/P (MAP) Pulse Ox O2 Delivery O2 Flow Rate FiO2


 


8/31/17 08:00 97.0 85 18 104/53 (70) 96   


 


8/31/17 04:30 97.0 84 17 90/50 (63) 96   


 


8/30/17 19:50 97.1 85 18 99/50 (66) 95   


 


8/30/17 16:00 97.6 88 17 90/51 (64) 94   








Result Diagram:  


8/30/17 0715                                                                   

             8/30/17 0715





Other Results





 Laboratory Tests








Test


  8/28/17


16:37 8/28/17


17:37 8/29/17


07:02 8/30/17


07:15


 


White Blood Count 9.2 TH/MM3   8.6 TH/MM3  9.7 TH/MM3 


 


Red Blood Count 2.16 MIL/MM3   2.76 MIL/MM3  3.16 MIL/MM3 


 


Hemoglobin 5.6 GM/DL   7.8 GM/DL  8.8 GM/DL 


 


Hematocrit 17.4 %   23.1 %  25.4 % 


 


Mean Corpuscular Volume 80.5 FL   83.6 FL  80.3 FL 


 


Mean Corpuscular Hemoglobin 26.0 PG   28.4 PG  27.7 PG 


 


Mean Corpuscular Hemoglobin


Concent 32.3 % 


  


  34.0 % 


  34.5 % 


 


 


Red Cell Distribution Width 19.9 %   18.4 %  23.6 % 


 


Platelet Count 252 TH/MM3   210 TH/MM3  170 TH/MM3 


 


Mean Platelet Volume 8.7 FL   8.8 FL  8.6 FL 


 


Neutrophils (%) (Auto) 82.3 %   69.7 %  73.5 % 


 


Lymphocytes (%) (Auto) 12.3 %   20.4 %  16.6 % 


 


Monocytes (%) (Auto) 4.9 %   7.9 %  7.7 % 


 


Eosinophils (%) (Auto) 0.1 %   1.5 %  1.8 % 


 


Basophils (%) (Auto) 0.4 %   0.5 %  0.4 % 


 


Neutrophils # (Auto) 7.6 TH/MM3   6.0 TH/MM3  7.1 TH/MM3 


 


Lymphocytes # (Auto) 1.1 TH/MM3   1.8 TH/MM3  1.6 TH/MM3 


 


Monocytes # (Auto) 0.5 TH/MM3   0.7 TH/MM3  0.8 TH/MM3 


 


Eosinophils # (Auto) 0.0 TH/MM3   0.1 TH/MM3  0.2 TH/MM3 


 


Basophils # (Auto) 0.0 TH/MM3   0.0 TH/MM3  0.0 TH/MM3 


 


CBC Comment DIFF FINAL   DIFF FINAL  AUTO DIFF 


 


Differential Comment


   


  


   


  AUTO DIFF


CONFIRMED


 


Prothrombin Time 10.3 SEC    


 


Prothromb Time International


Ratio 0.9 RATIO 


  


  


  


 


 


Activated Partial


Thromboplast Time 22.6 SEC 


  


  


  


 


 


Blood Urea Nitrogen 45 MG/DL   48 MG/DL  41 MG/DL 


 


Creatinine 0.84 MG/DL   0.80 MG/DL  0.70 MG/DL 


 


Random Glucose 110 MG/DL   91 MG/DL  97 MG/DL 


 


Calcium Level 8.1 MG/DL   7.6 MG/DL  7.6 MG/DL 


 


Magnesium Level 2.2 MG/DL    2.2 MG/DL 


 


Sodium Level 136 MEQ/L   138 MEQ/L  140 MEQ/L 


 


Potassium Level 4.2 MEQ/L   3.6 MEQ/L  3.4 MEQ/L 


 


Chloride Level 103 MEQ/L   105 MEQ/L  108 MEQ/L 


 


Carbon Dioxide Level 25.0 MEQ/L   23.3 MEQ/L  23.0 MEQ/L 


 


Anion Gap 8 MEQ/L   10 MEQ/L  9 MEQ/L 


 


Estimat Glomerular Filtration


Rate 65 ML/MIN 


  


  68 ML/MIN 


  80 ML/MIN 


 


 


Iron Level 35 MCG/DL    


 


Total Iron Binding Capacity 416 MCG/DL    


 


Percent Iron Saturation 8.4 %    


 


Ferritin 20 NG/ML    


 


B-Type Natriuretic Peptide 128 PG/ML    


 


Urine Color  YELLOW   


 


Urine Turbidity  CLEAR   


 


Urine pH  5.5   


 


Urine Specific Gravity  1.021   


 


Urine Protein  NEG mg/dL   


 


Urine Glucose (UA)  NEG mg/dL   


 


Urine Ketones  NEG mg/dL   


 


Urine Occult Blood  SMALL   


 


Urine Nitrite  NEG   


 


Urine Bilirubin  NEG   


 


Urine Urobilinogen


  


  LESS THAN 2.0


MG/DL 


  


 


 


Urine Leukocyte Esterase  SMALL   


 


Urine RBC  1 /hpf   


 


Urine WBC  4 /hpf   


 


Urine Squamous Epithelial


Cells 


  <1 /hpf 


  


  


 


 


Urine Transitional Epithelial


Cells 


  1 /hpf 


  


  


 


 


Urine Bacteria  RARE /hpf   


 


Microscopic Urinalysis Comment


  


  CULT NOT


INDICATED 


  


 


 


Platelet Estimate    NORMAL 


 


Platelet Morphology Comment    ENLARGED 


 


Polychromasia    2.0 % 


 


Ovalocytes    1+ 


 


Acanthocytes    OCC 


 


Haptoglobin    128 MG/DL 


 


Lactate Dehydrogenase    155 U/L 


 


Total Bilirubin    1.2 MG/DL 


 


Direct Bilirubin    0.3 MG/DL 


 


Indirect Bilirubin    0.9 MG/DL 


 


Vitamin B12 Level    543 PG/ML 


 


Folate


  


  


  


  GREATER THAN


20.0 NG/ML








Imaging





Last Impressions








Chest X-Ray 8/28/17 1629 Signed





Impressions: 





 Service Date/Time:  Monday, August 28, 2017 16:44 - CONCLUSION: No acute 





 disease.       Tanner Vann MD  FACR








Objective Remarks


General: in NAD


Cardiac: Regular rate and rhythm 4/6 systolic ejection murmur


Respiratory:  cta


GI: abd soft nontender


Extremities: ext no edema





A/P


Problem List:  


(1) Symptomatic anemia


ICD Codes:  D64.9 - Anemia, unspecified


Status:  Chronic


Plan:  - Pt is an 83 y/o WF with iron deficiency anemia/GIB/SB angioectasia, 

severe aortic stenosis, hx of CHF, 


 HTN, and hyperlipidemia.  


- Pt has had ongoing issues with anemia and obscure GIB. 


- She was previously admitted in June 2017 and underwent Small bowel enteroscopy

/incomplete colonoscopy 


 (6/1/17) which noted thickened fold in the duodenum, otherwise normal 

enteroscopy and unfortunately the scope 


 was unable to be passed beyond the sigmoid 25 cm secondary to either twisted 

colon, spasm or external 


 compression, it was felt to not be safe to push the scope without seeing the 

lumen because of fear of perforation 


 so endoscopy was terminated. 


- As an outpt she had Capsule endoscopy (8/1/17) which noted multiple 

angiectasia with active bleeding and pt 


 was sent for antegrade single balloon enteroscopy (8/7/17) but unfortunately 

there was no active bleeding found


 and the enteroscopy was found to be normal except for a hiatal hernia. 


- Pts outpt labs had been stable with Hgb ranging between 9-10. Her last labs 

on 8/24 noted Hgb 9.3. 


- She reports that she developed increased SOB  with increased weakness and has 

required


 the use of her walker. Pt reports that her stools are always dark as she takes 

iron supplements. 


- Her labs in the ED noted a drop in her H/H to 5.6/17.4. 


- Iron studies prior to transfusion noted Serum Fe 35, TIBC 416, %Sat 8.4, 

Ferritin 20


- Pt was transfused with 4 units of PRBCs with improvement 


- Monitor closely for any signs of volume overload with transfusion


- Hold ASA/Plavix which were started during last admission for trial before TAVR


- Discussed with Dr. Low will discontinue aspirin and Plavix


- DVT prophylaxis with SCDs


- GI and Hematology following also following


- GI recommending double balloon enteroscopy at other facility, can be arranged 

as outpt


- Hematology recommending  transfuse iron sucrose x 3 days total


- repeat CBC for today ordered and pending


- recheck labs in AM





(2) GI bleed


ICD Codes:  K92.2 - Gastrointestinal hemorrhage, unspecified


Status:  Resolved


Plan:  


- See above





(3) Aortic valve stenosis, severe


ICD Codes:  I35.0 - Nonrheumatic aortic (valve) stenosis


Status:  Acute


Plan:  


- Her last admission was from 8/18-8/21 for SOB and felt to have acute CHF 

symptoms related to 


 severe AVS.  Pt was diuresed with improvement. 


- 2D echocardiogram (8/19/17) which noted severe aortic valve stenosis and she 

was initially decompensated. 


- She was evaluated with a  LHC and consideration for AVR and this was 

performed on 8/21 and showed critical


 AS but nonobstructive CAD. 


- She was seen by CTS who did the initial evaluation for TAVR vs traditional 

AVR.


- It was decided to send the pt home on ACE/diuretic/ASA/Plavix and monitored 

for bleeding as she has a hx of previous GIB. 





(4) Hypertension


ICD Codes:  I10 - Essential (primary) hypertension


Status:  Chronic


Plan:  


- Home meds continued with hold parameters


- currently with asymptomatic hypotension- request nursing to recheck BP 

manually with proper size cuff





(5) Hyperlipidemia


ICD Codes:  E78.5 - Hyperlipidemia, unspecified


Status:  Chronic


Plan:  


- Home meds continued





(6) CHF (congestive heart failure)


ICD Codes:  I50.9 - Heart failure, unspecified


Status:  Acute


Plan:  continue lasix 20 mg daily





Assessment and Plan


Patient examined.


Assessment and plan formulated with Lyly Chacon PA-C.


I agree with the above.





Problem Qualifiers





(1) CHF (congestive heart failure):  








Lyly Chacon Aug 31, 2017 13:33


Zia Puckett DO Sep 2, 2017 00:55

## 2017-08-31 NOTE — HHI.GIFU
Subjective


Remarks


OOB  to chair, daughter at bedside.  No new complaints.   


 (Guillermina Holloway)





Objective


Vitals I&O





Vital Signs








  Date Time  Temp Pulse Resp B/P (MAP) Pulse Ox O2 Delivery O2 Flow Rate FiO2


 


8/31/17 12:00 96.8 80 18 98/55 (69) 97   


 


8/31/17 08:00 97.0 85 18 104/53 (70) 96   


 


8/31/17 04:30 97.0 84 17 90/50 (63) 96   


 


8/30/17 19:50 97.1 85 18 99/50 (66) 95   














I/O      


 


 8/30/17 8/30/17 8/30/17 8/31/17 8/31/17 8/31/17





 07:00 15:00 23:00 07:00 15:00 23:00


 


Intake Total 520 ml 840 ml 780 ml 240 ml  


 


Balance 520 ml 840 ml 780 ml 240 ml  


 


      


 


Intake Oral  840 ml 780 ml 240 ml  


 


Packed Cells 500 ml     


 


Blood Product IV Normal Saline Flush 20 ml     


 


# Voids  6 3 4  


 


# Bowel Movements  2 0 0  








Laboratory





Laboratory Tests








Test


  8/31/17


14:45


 


White Blood Count 10.2 


 


Red Blood Count 2.62 


 


Hemoglobin 7.2 


 


Hematocrit 22.1 


 


Mean Corpuscular Volume 84.0 


 


Mean Corpuscular Hemoglobin 27.5 


 


Mean Corpuscular Hemoglobin


Concent 32.7 


 


 


Red Cell Distribution Width 23.7 


 


Platelet Count 198 


 


Mean Platelet Volume 8.5 


 


Neutrophils (%) (Auto) 77.5 


 


Lymphocytes (%) (Auto) 14.6 


 


Monocytes (%) (Auto) 5.1 


 


Eosinophils (%) (Auto) 2.3 


 


Basophils (%) (Auto) 0.5 


 


Neutrophils # (Auto) 7.9 


 


Lymphocytes # (Auto) 1.5 


 


Monocytes # (Auto) 0.5 


 


Eosinophils # (Auto) 0.2 


 


Basophils # (Auto) 0.0 


 


CBC Comment AUTO DIFF 


 


Differential Comment


  AUTO DIFF


CONFIRMED








Physical Exam


HEENT: PERRL; normocephalic; atraumatic; no jaundice. 


CHEST:  CTA


CARDIAC:  RRR +murmur


ABDOMEN:  Soft, nondistended, nontender; no hepatosplenomegaly; bowel sounds 

are present in all four quadrants.


EXTREMITIES: No clubbing, cyanosis, or edema.


SKIN:  Normal; no rash; no jaundice.


CNS:  No focal deficits; alert and oriented times three.


 (Guillermina Holloway)





Assessment and Plan


Plan


ASSESSMENT


- anemia - 5.6 on admission.  Was 10 a week ago when started on plavix and ASA.

  low iron.  6-1-17 SB enteroscopy/colonoscopy --> thickened fold duodenum, 

limtied d/t twisted


   colon or spasm or ext compression.  8-1-17 capsule endoscopy foudn multiple 

angioectasias and active bleeding but subsequent balloon enteroscopy not done b/

c no active 


   bleed found.  She is s/p 4 x PRBC and hgb up to 8.8 but today dropped to 

7.2.  hematology now following, s/p iron transfusion 


- black tarry stool - for last 2-3 months.  endoscopy as above.  denies NSAID 

use.  has been on plavix and ASA





PLAN


- monitor HH


- transfuse as needed


- repeat capsule endoscopy as outpatient


- consider double balloon enteroscopy at other facility, can be arranged as 

outpt


- embolization for active significant bleeding  


- supportive care


- further recs to follow


- d/w daughter further recs tomorrow,  if she is not here she would like us to 

contact her Kathryn [phone]





This pt seen by myself and Dr Desai and this note is written on his behalf 


 (Guillermina Holloway)


Physician Comments


Seen with Ruth, assessment and plan as above, given the continues drop in HH 

without gross bleeding, and lack of DBE in [facility], will arrange for 

reprat capsule endoscopy again and possible enteroscopy.


Further recommendations to follow.


 (Amira Desai MD)











Guillermina Holloway Aug 31, 2017 16:43


Amira Desai MD Aug 31, 2017 17:51

## 2017-09-01 VITALS
SYSTOLIC BLOOD PRESSURE: 108 MMHG | HEART RATE: 64 BPM | RESPIRATION RATE: 17 BRPM | DIASTOLIC BLOOD PRESSURE: 68 MMHG | TEMPERATURE: 97.4 F | OXYGEN SATURATION: 94 %

## 2017-09-01 VITALS
TEMPERATURE: 98.1 F | DIASTOLIC BLOOD PRESSURE: 62 MMHG | OXYGEN SATURATION: 97 % | HEART RATE: 90 BPM | SYSTOLIC BLOOD PRESSURE: 104 MMHG | RESPIRATION RATE: 17 BRPM

## 2017-09-01 VITALS
TEMPERATURE: 97.1 F | HEART RATE: 80 BPM | DIASTOLIC BLOOD PRESSURE: 76 MMHG | RESPIRATION RATE: 17 BRPM | SYSTOLIC BLOOD PRESSURE: 120 MMHG | OXYGEN SATURATION: 97 %

## 2017-09-01 VITALS
OXYGEN SATURATION: 98 % | SYSTOLIC BLOOD PRESSURE: 114 MMHG | DIASTOLIC BLOOD PRESSURE: 60 MMHG | HEART RATE: 88 BPM | TEMPERATURE: 97.3 F | RESPIRATION RATE: 18 BRPM

## 2017-09-01 VITALS
TEMPERATURE: 97.4 F | RESPIRATION RATE: 17 BRPM | OXYGEN SATURATION: 96 % | SYSTOLIC BLOOD PRESSURE: 130 MMHG | DIASTOLIC BLOOD PRESSURE: 70 MMHG | HEART RATE: 94 BPM

## 2017-09-01 VITALS
OXYGEN SATURATION: 99 % | RESPIRATION RATE: 16 BRPM | DIASTOLIC BLOOD PRESSURE: 60 MMHG | HEART RATE: 91 BPM | TEMPERATURE: 97.8 F | SYSTOLIC BLOOD PRESSURE: 108 MMHG

## 2017-09-01 VITALS
TEMPERATURE: 97.6 F | DIASTOLIC BLOOD PRESSURE: 56 MMHG | SYSTOLIC BLOOD PRESSURE: 99 MMHG | RESPIRATION RATE: 18 BRPM | OXYGEN SATURATION: 97 % | HEART RATE: 90 BPM

## 2017-09-01 VITALS
SYSTOLIC BLOOD PRESSURE: 99 MMHG | HEART RATE: 90 BPM | OXYGEN SATURATION: 97 % | RESPIRATION RATE: 18 BRPM | TEMPERATURE: 97.6 F | DIASTOLIC BLOOD PRESSURE: 56 MMHG

## 2017-09-01 LAB
ACANTHOCYTES BLD QL SMEAR: (no result)
ANION GAP SERPL CALC-SCNC: 8 MEQ/L (ref 5–15)
BASOPHILS # BLD AUTO: 0 TH/MM3 (ref 0–0.2)
BASOPHILS NFR BLD: 0.5 % (ref 0–2)
BUN SERPL-MCNC: 22 MG/DL (ref 7–18)
CHLORIDE SERPL-SCNC: 106 MEQ/L (ref 98–107)
EOSINOPHIL # BLD: 0.4 TH/MM3 (ref 0–0.4)
EOSINOPHIL NFR BLD: 3 % (ref 0–4)
EOSINOPHIL NFR BLD: 3.5 % (ref 0–4)
ERYTHROCYTE [DISTWIDTH] IN BLOOD BY AUTOMATED COUNT: 22.5 % (ref 11.6–17.2)
GFR SERPLBLD BASED ON 1.73 SQ M-ARVRAT: 60 ML/MIN (ref 89–?)
HCO3 BLD-SCNC: 24.1 MEQ/L (ref 21–32)
HCT VFR BLD CALC: 28.6 % (ref 35–46)
HEMO FLAGS: (no result)
LYMPHOCYTES # BLD AUTO: 1.7 TH/MM3 (ref 1–4.8)
LYMPHOCYTES NFR BLD AUTO: 15.8 % (ref 9–44)
MCH RBC QN AUTO: 29.1 PG (ref 27–34)
MCHC RBC AUTO-ENTMCNC: 34.1 % (ref 32–36)
MCV RBC AUTO: 85.4 FL (ref 80–100)
MONOCYTES NFR BLD: 6.1 % (ref 0–8)
MYELOCYTES NFR BLD: 2 % (ref 0–0)
NEUTROPHILS # BLD AUTO: 8.2 TH/MM3 (ref 1.8–7.7)
NEUTROPHILS NFR BLD AUTO: 74.1 % (ref 16–70)
NEUTS BAND # BLD MANUAL: 8.4 TH/MM3 (ref 1.8–7.7)
NEUTS BAND NFR BLD: 6 % (ref 0–6)
NEUTS SEG NFR BLD MANUAL: 68 % (ref 16–70)
PLAT MORPH BLD: NORMAL
PLATELET # BLD: 187 TH/MM3 (ref 150–450)
PLATELET BLD QL SMEAR: NORMAL
POLYCHROMASIA BLD QL SMEAR: 2.9 % (ref 0–1.9)
POTASSIUM SERPL-SCNC: 4 MEQ/L (ref 3.5–5.1)
RBC # BLD AUTO: 3.35 MIL/MM3 (ref 4–5.3)
SCAN/DIFF: (no result)
SODIUM SERPL-SCNC: 138 MEQ/L (ref 136–145)
WBC # BLD AUTO: 11 TH/MM3 (ref 4–11)
WBC DIFF SAMPLE: 100

## 2017-09-01 RX ADMIN — FUROSEMIDE SCH MG: 20 TABLET ORAL at 09:00

## 2017-09-01 RX ADMIN — Medication SCH MG: at 09:08

## 2017-09-01 RX ADMIN — CETIRIZINE HYDROCHLORIDE SCH MG: 10 TABLET, FILM COATED ORAL at 09:09

## 2017-09-01 RX ADMIN — POTASSIUM CHLORIDE SCH MEQ: 20 TABLET, EXTENDED RELEASE ORAL at 09:07

## 2017-09-01 RX ADMIN — Medication SCH TAB: at 09:05

## 2017-09-01 RX ADMIN — SERTRALINE HYDROCHLORIDE SCH MG: 50 TABLET, FILM COATED ORAL at 09:05

## 2017-09-01 RX ADMIN — ACETAMINOPHEN PRN MG: 325 TABLET ORAL at 16:03

## 2017-09-01 RX ADMIN — Medication SCH ML: at 21:18

## 2017-09-01 RX ADMIN — PANTOPRAZOLE SODIUM SCH MG: 40 INJECTION, POWDER, FOR SOLUTION INTRAVENOUS at 09:10

## 2017-09-01 RX ADMIN — ATORVASTATIN CALCIUM SCH MG: 20 TABLET, FILM COATED ORAL at 21:18

## 2017-09-01 RX ADMIN — SODIUM CHLORIDE SCH MLS/HR: 900 INJECTION, SOLUTION INTRAVENOUS at 09:10

## 2017-09-01 RX ADMIN — PANTOPRAZOLE SODIUM SCH MG: 40 INJECTION, POWDER, FOR SOLUTION INTRAVENOUS at 21:18

## 2017-09-01 RX ADMIN — LISINOPRIL SCH MG: 5 TABLET ORAL at 09:00

## 2017-09-01 RX ADMIN — Medication SCH ML: at 09:10

## 2017-09-01 RX ADMIN — ACETAMINOPHEN PRN MG: 325 TABLET ORAL at 09:07

## 2017-09-01 RX ADMIN — ACETAMINOPHEN PRN MG: 325 TABLET ORAL at 21:19

## 2017-09-01 NOTE — HHI.GIFU
Subjective


Remarks


Resting in bed.  No n/v. No abdominal pain.  C/O dark stool, but otherwise no 

GI symptoms.  


 (Chinyere Silva)





Objective


Vitals I&O





Vital Signs








  Date Time  Temp Pulse Resp B/P (MAP) Pulse Ox O2 Delivery O2 Flow Rate FiO2


 


9/1/17 08:00 97.4 64 17 108/68 (81) 94   


 


9/1/17 05:25 97.8 91 16 108/60 99   


 


9/1/17 04:00 97.4 94 17 130/70 (90) 96   


 


9/1/17 02:43 98.1 90 17 104/62 97   


 


9/1/17 02:15 97.6 90 18 99/56 97   


 


9/1/17 02:05 97.6 90 18 99/56 97   


 


8/31/17 23:38 98.2 92 17 94/52 95   


 


8/31/17 23:20 97.9 87 17 97/48 96   


 


8/31/17 20:00  90      


 


8/31/17 19:00 98.8 92 17 99/54 (69) 96   


 


8/31/17 16:00 97.6 86 16 106/60 (75) 96   














I/O      


 


 8/31/17 8/31/17 8/31/17 9/1/17 9/1/17 9/1/17





 07:00 15:00 23:00 07:00 15:00 23:00


 


Intake Total 240 ml 590 ml 480 ml 1010 ml  


 


Balance 240 ml 590 ml 480 ml 1010 ml  


 


      


 


Intake Oral 240 ml 480 ml 480 ml 480 ml  


 


IV Total  110 ml    


 


Packed Cells    500 ml  


 


Blood Product IV Normal Saline Flush    30 ml  


 


# Voids 4 4 3 3  


 


# Bowel Movements 0 1 0 0  








Laboratory





Laboratory Tests








Test


  9/1/17


07:06


 


White Blood Count 11.0 


 


Red Blood Count 3.35 


 


Hemoglobin 9.8 


 


Hematocrit 28.6 


 


Mean Corpuscular Volume 85.4 


 


Mean Corpuscular Hemoglobin 29.1 


 


Mean Corpuscular Hemoglobin


Concent 34.1 


 


 


Red Cell Distribution Width 22.5 


 


Platelet Count 187 


 


Mean Platelet Volume 8.7 


 


Neutrophils (%) (Auto) 74.1 


 


Lymphocytes (%) (Auto) 15.8 


 


Monocytes (%) (Auto) 6.1 


 


Eosinophils (%) (Auto) 3.5 


 


Basophils (%) (Auto) 0.5 


 


Neutrophils # (Auto) 8.2 


 


Lymphocytes # (Auto) 1.7 


 


Monocytes # (Auto) 0.7 


 


Eosinophils # (Auto) 0.4 


 


Basophils # (Auto) 0.0 


 


CBC Comment AUTO DIFF 


 


Differential Total Cells


Counted 100 


 


 


Neutrophils % (Manual) 68 


 


Band Neutrophils % 6 


 


Lymphocytes % 16 


 


Monocytes % 5 


 


Eosinophils % 3 


 


Neutrophils # (Manual) 8.4 


 


Myelocytes 2 


 


Differential Comment


  FINAL DIFF


MANUAL


 


Platelet Estimate NORMAL 


 


Platelet Morphology Comment NORMAL 


 


Polychromasia 2.9 


 


Acanthocytes OCC 


 


Blood Urea Nitrogen 22 


 


Creatinine 0.89 


 


Random Glucose 91 


 


Calcium Level 8.0 


 


Sodium Level 138 


 


Potassium Level 4.0 


 


Chloride Level 106 


 


Carbon Dioxide Level 24.1 


 


Anion Gap 8 


 


Estimat Glomerular Filtration


Rate 60 


 








Imaging





Last Impressions








Chest X-Ray 8/30/17 0600 Signed





Impressions: 





 Service Date/Time:  Wednesday, August 30, 2017 06:22 - CONCLUSION: No acute 





 disease.       Madhu Frank MD 








Physical Exam


HEENT: Normocephalic; atraumatic 


CHEST:  CTA


CARDIAC:  RRR, systolic murmur


ABDOMEN:  Soft, nondistended, nontender; no hepatosplenomegaly; bowel sounds 

are present in all four quadrants.


EXTREMITIES: No clubbing, cyanosis, or edema.


SKIN:  Normal; no rash; no jaundice.


CNS:  No focal deficits; alert and oriented times three.


 (Chinyere Silva [facility])





Assessment and Plan


Plan


ASSESSMENT


- GIB, Melena.  Recent started on plavix/asa.  S/P Extensive GI workup.  

Colonoscopy (2/10/16) --> Very tortuous, edematous sigmoid colon, possible 


  diverticular colitis, diminutive polyps in the ascending colon, sigmoid 

diverticulosis, grade I hemorrhoids, decreased sphincter tone. EGD/colonoscopy (

9/21/16) --> 


  gastritis, HH, esophagitis, diverticulosis, hemorrhoids, polyps, incomplete 

colonoscopy.  Colonoscopy (9/23/16) --> Severe diverticulosis in the left colon

, colitis, 


  hemorrhoids, small polyp.  Colonoscopy (6/1/17) --> Scope was unable to be 

passed beyond the sigmoid 25 cm secondary to either twisted colon, spasm or 

external 


  compression, it was felt to not be safe to push the scope without seeing the 

lumen because of fear of perforation so endoscopy was terminated. Small bowel 


  enteroscopy (6/1/17) --> thickened fold in the duodenum Bx done otherwise 

normal.  Capsule endoscopy (8/1/17) --> Multiple angiectasia with active 

bleeding.  


  Antegrade single balloon enteroscopy (8/7/17) --> Normal enteroscopy except 

for a hiatal hernia.  Pt continues to have dark stools.  Presented with severe 

anemia,


  5.6/17.4 on admission, S/P 6 units PRBC.  HH stable 9.8/28.6. On PPI with BID 

dosing. Off plavix/ASA. 


- Severe anemia.  5.6/17.4 on admission, S/P 6 units PRBC.  HH stable 9.8/28.6.

  Hematology following


- Severe aortic stenosis, CHF, HTN. per cardiology





PLAN:


- ZULAY


- Cont. PPI


- Monitor HH


- If no significant drop in Hgb overnight, okay for patient to be discharged 

tomorrow with outpt follow up


- Plan for referral to tertiary as outpatient for Double balloon enteroscopy


- Pt seen and examined by Dr. Thibodeaux and myself and this note is written on 

his behalf








 (Chinyere Silva)


Plan


I have a contact at [facility].  I will try to contact him 

tomorrow to arrange for double balloon enteroscopy.  


 (Jason Thibodeaux MD)











Chinyere Silva Sep 1, 2017 15:51


Jason Thibodeaux MD Sep 1, 2017 21:20

## 2017-09-01 NOTE — PD.ONC.PN
Subjective


Subjective


Remarks


feels more energetic


GI evaluation ongoing 


Will need additional iron infusions outpatient


Clinic information given to the patient for f/u


d/w rn


overnight events reviewed





Objective


Data











  Date Time  Temp Pulse Resp B/P (MAP) Pulse Ox O2 Delivery O2 Flow Rate FiO2


 


9/1/17 08:00 97.4 64 17 108/68 (81) 94   


 


9/1/17 05:25 97.8 91 16 108/60 99   


 


9/1/17 04:00 97.4 94 17 130/70 (90) 96   


 


9/1/17 02:43 98.1 90 17 104/62 97   


 


9/1/17 02:15 97.6 90 18 99/56 97   


 


9/1/17 02:05 97.6 90 18 99/56 97   


 


8/31/17 23:38 98.2 92 17 94/52 95   


 


8/31/17 23:20 97.9 87 17 97/48 96   


 


8/31/17 20:00  90      


 


8/31/17 19:00 98.8 92 17 99/54 (69) 96   


 


8/31/17 16:00 97.6 86 16 106/60 (75) 96   














 9/1/17 9/1/17 9/1/17





 07:00 15:00 23:00


 


Intake Total 1010 ml  


 


Balance 1010 ml  








Result Diagram:  


9/1/17 0706 9/1/17 0706





Laboratory Results





Laboratory Tests








Test


  8/31/17


14:45 9/1/17


07:06


 


White Blood Count 10.2 TH/MM3  11.0 TH/MM3 


 


Red Blood Count 2.62 MIL/MM3  3.35 MIL/MM3 


 


Hemoglobin 7.2 GM/DL  9.8 GM/DL 


 


Hematocrit 22.1 %  28.6 % 


 


Mean Corpuscular Volume 84.0 FL  85.4 FL 


 


Mean Corpuscular Hemoglobin 27.5 PG  29.1 PG 


 


Mean Corpuscular Hemoglobin


Concent 32.7 % 


  34.1 % 


 


 


Red Cell Distribution Width 23.7 %  22.5 % 


 


Platelet Count 198 TH/MM3  187 TH/MM3 


 


Mean Platelet Volume 8.5 FL  8.7 FL 


 


Neutrophils (%) (Auto) 77.5 %  74.1 % 


 


Lymphocytes (%) (Auto) 14.6 %  15.8 % 


 


Monocytes (%) (Auto) 5.1 %  6.1 % 


 


Eosinophils (%) (Auto) 2.3 %  3.5 % 


 


Basophils (%) (Auto) 0.5 %  0.5 % 


 


Neutrophils # (Auto) 7.9 TH/MM3  8.2 TH/MM3 


 


Lymphocytes # (Auto) 1.5 TH/MM3  1.7 TH/MM3 


 


Monocytes # (Auto) 0.5 TH/MM3  0.7 TH/MM3 


 


Eosinophils # (Auto) 0.2 TH/MM3  0.4 TH/MM3 


 


Basophils # (Auto) 0.0 TH/MM3  0.0 TH/MM3 


 


CBC Comment AUTO DIFF  AUTO DIFF 


 


Differential Comment


  AUTO DIFF


CONFIRMED FINAL DIFF


MANUAL


 


Differential Total Cells


Counted 


  100 


 


 


Neutrophils % (Manual)  68 % 


 


Band Neutrophils %  6 % 


 


Lymphocytes %  16 % 


 


Monocytes %  5 % 


 


Eosinophils %  3 % 


 


Neutrophils # (Manual)  8.4 TH/MM3 


 


Myelocytes  2 % 


 


Platelet Estimate  NORMAL 


 


Platelet Morphology Comment  NORMAL 


 


Polychromasia  2.9 % 


 


Acanthocytes  OCC 


 


Blood Urea Nitrogen  22 MG/DL 


 


Creatinine  0.89 MG/DL 


 


Random Glucose  91 MG/DL 


 


Calcium Level  8.0 MG/DL 


 


Sodium Level  138 MEQ/L 


 


Potassium Level  4.0 MEQ/L 


 


Chloride Level  106 MEQ/L 


 


Carbon Dioxide Level  24.1 MEQ/L 


 


Anion Gap  8 MEQ/L 


 


Estimat Glomerular Filtration


Rate 


  60 ML/MIN 


 











Administered Medications








 Medications


  (Trade)  Dose


 Ordered  Sig/Brigitte


 Route


 PRN Reason  Start Time


 Stop Time Status Last Admin


Dose Admin


 


 Sodium Chloride


  (NS Flush)  2 ml  BID


 IV FLUSH


   8/28/17 21:00


    9/1/17 09:10


 


 


 Acetaminophen


  (Tylenol)  650 mg  Q4H  PRN


 PO


 TEMP > 100.4  8/28/17 17:45


    9/1/17 09:07


 


 


 Alprazolam


  (Xanax)  0.25 mg  TID  PRN


 PO


 ANXIETY  8/28/17 17:45


    9/1/17 09:10


 


 


 Atorvastatin


 Calcium


  (Lipitor)  20 mg  HS


 PO


   8/28/17 21:00


    8/31/17 20:55


 


 


 Cetirizine HCl


  (ZyrTEC)  10 mg  DAILY


 PO


   8/29/17 09:00


    9/1/17 09:09


 


 


 Furosemide


  (Lasix)  20 mg  DAILY


 PO


   8/29/17 09:00


    8/30/17 09:00


 


 


 Lisinopril


  (Prinivil)  2.5 mg  DAILY


 PO


   8/29/17 09:00


    8/30/17 09:01


 


 


 Vit C/Vit E/Zinc/


 Copper/Lutein


  (Ocuvite)  1 tab  DAILY


 PO


   8/29/17 09:00


    9/1/17 09:05


 


 


 Polysaccharide


 Iron Complex


  (Nu-Iron)  150 mg  DAILY


 PO


   8/29/17 09:00


    9/1/17 09:08


 


 


 Potassium Chloride


  (KCl)  20 meq  DAILY


 PO


   8/29/17 09:00


    9/1/17 09:07


 


 


 Sertraline HCl


  (Zoloft)  150 mg  DAILY


 PO


   8/29/17 09:00


    9/1/17 09:05


 


 


 Pantoprazole


 Sodium


  (Protonix Inj)  40 mg  Q12H


 IV PUSH


   8/28/17 20:00


    9/1/17 09:10


 








Objective Remarks


GENERAL: nad.


SKIN: Warm and dry. 


LYMPHATIC: No adenopathy.


CARDIOVASCULAR: Regular rate and rhythm without murmurs. 


RESPIRATORY: Breath sounds equal bilaterally. No accessory muscle use.


GASTROINTESTINAL: Abdomen soft, non-tender, nondistended. 


EXTREMITIES: No cyanosis, or edema. 


 








Assessment/Plan


Problem List:  


(1) Anemia


ICD Codes:  D64.9 - Anemia, unspecified


Status:  Acute


Plan:  -- Iron deficiency likely due to GI bleed


-- Microcytosis noted


-- Will transfuse iron sucrose x 3 days





(2) GI bleed


ICD Codes:  K92.2 - Gastrointestinal hemorrhage, unspecified


Status:  Resolved


Plan:  -- GI evaluation in June 2017; she was found to have thickened duodenum.


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


-- After that she had antegrade single enteroscopy that did not show any active 

bleeding


-- GI recommend that she has a double balloon enteroscopy as an outpatient at 

another facility





(3) Aortic valve stenosis, severe


ICD Codes:  I35.0 - Nonrheumatic aortic (valve) stenosis


Status:  Acute


Plan:  -- Echocardiogram on 8/19/17 showed severe aortic valve stenosis


--Per her cardiologist, there'll be no intervention until cleared by GI





Assessment


84-year-old female who has a past medical history of severe aortic stenosis, 

hypertension, hyperlipidemia, iron deficiency anemia, history of GI bleed who 

presents to the emergency department with acute dyspnea and was found


to have severe anemia.





1. Severe anemia which secondary to iron deficiency/GI bleeding  with a


- no evidence of hemolysis


- Will give additional iron infusion tomorrow


- OK to d/c from hematology standpoint and f/u with me in clinic in 1 week





2. Severe aortic stenosis.  Evaluation per Cardiothoracic Surgery.





3. GI bleeding: on going eval





Long discussion with patient and her daughter


Plan














Hamzah Newton MD Sep 1, 2017 12:02

## 2017-09-01 NOTE — HHI.PR
Subjective


Remarks


Pt has not had any active bleeding. 


She is very upset and frustrated about her issues with the obscure GIB





Objective


Vitals





Vital Signs








  Date Time  Temp Pulse Resp B/P (MAP) Pulse Ox O2 Delivery O2 Flow Rate FiO2


 


9/1/17 08:00 97.4 64 17 108/68 (81) 94   


 


9/1/17 05:25 97.8 91 16 108/60 99   


 


9/1/17 04:00 97.4 94 17 130/70 (90) 96   


 


9/1/17 02:43 98.1 90 17 104/62 97   


 


9/1/17 02:15 97.6 90 18 99/56 97   


 


9/1/17 02:05 97.6 90 18 99/56 97   


 


8/31/17 23:38 98.2 92 17 94/52 95   


 


8/31/17 23:20 97.9 87 17 97/48 96   


 


8/31/17 20:00  90      


 


8/31/17 19:00 98.8 92 17 99/54 (69) 96   


 


8/31/17 16:00 97.6 86 16 106/60 (75) 96   








Result Diagram:  


9/1/17 0706                                                                    

            9/1/17 0706





Other Results





 Laboratory Tests








Test


  8/31/17


14:45 9/1/17


07:06


 


White Blood Count 10.2 TH/MM3  11.0 TH/MM3 


 


Red Blood Count 2.62 MIL/MM3  3.35 MIL/MM3 


 


Hemoglobin 7.2 GM/DL  9.8 GM/DL 


 


Hematocrit 22.1 %  28.6 % 


 


Mean Corpuscular Volume 84.0 FL  85.4 FL 


 


Mean Corpuscular Hemoglobin 27.5 PG  29.1 PG 


 


Mean Corpuscular Hemoglobin


Concent 32.7 % 


  34.1 % 


 


 


Red Cell Distribution Width 23.7 %  22.5 % 


 


Platelet Count 198 TH/MM3  187 TH/MM3 


 


Mean Platelet Volume 8.5 FL  8.7 FL 


 


Neutrophils (%) (Auto) 77.5 %  74.1 % 


 


Lymphocytes (%) (Auto) 14.6 %  15.8 % 


 


Monocytes (%) (Auto) 5.1 %  6.1 % 


 


Eosinophils (%) (Auto) 2.3 %  3.5 % 


 


Basophils (%) (Auto) 0.5 %  0.5 % 


 


Neutrophils # (Auto) 7.9 TH/MM3  8.2 TH/MM3 


 


Lymphocytes # (Auto) 1.5 TH/MM3  1.7 TH/MM3 


 


Monocytes # (Auto) 0.5 TH/MM3  0.7 TH/MM3 


 


Eosinophils # (Auto) 0.2 TH/MM3  0.4 TH/MM3 


 


Basophils # (Auto) 0.0 TH/MM3  0.0 TH/MM3 


 


CBC Comment AUTO DIFF  AUTO DIFF 


 


Differential Comment


  AUTO DIFF


CONFIRMED FINAL DIFF


MANUAL


 


Differential Total Cells


Counted 


  100 


 


 


Neutrophils % (Manual)  68 % 


 


Band Neutrophils %  6 % 


 


Lymphocytes %  16 % 


 


Monocytes %  5 % 


 


Eosinophils %  3 % 


 


Neutrophils # (Manual)  8.4 TH/MM3 


 


Myelocytes  2 % 


 


Platelet Estimate  NORMAL 


 


Platelet Morphology Comment  NORMAL 


 


Polychromasia  2.9 % 


 


Acanthocytes  OCC 


 


Blood Urea Nitrogen  22 MG/DL 


 


Creatinine  0.89 MG/DL 


 


Random Glucose  91 MG/DL 


 


Calcium Level  8.0 MG/DL 


 


Sodium Level  138 MEQ/L 


 


Potassium Level  4.0 MEQ/L 


 


Chloride Level  106 MEQ/L 


 


Carbon Dioxide Level  24.1 MEQ/L 


 


Anion Gap  8 MEQ/L 


 


Estimat Glomerular Filtration


Rate 


  60 ML/MIN 


 








Imaging





Last Impressions








Chest X-Ray 8/28/17 1629 Signed





Impressions: 





 Service Date/Time:  Monday, August 28, 2017 16:44 - CONCLUSION: No acute 





 disease.       Tanner Vann MD  FACR








Objective Remarks


General: in NAD


Cardiac: Regular rate and rhythm 4/6 systolic ejection murmur


Respiratory:  cta


GI: abd soft nontender


Extremities: ext no edema





A/P


Problem List:  


(1) Symptomatic anemia


ICD Codes:  D64.9 - Anemia, unspecified


Status:  Chronic


Plan:  - Pt is an 83 y/o WF with iron deficiency anemia/GIB/SB angioectasia, 

severe aortic stenosis, hx of CHF, 


 HTN, and hyperlipidemia.  


- Pt has had ongoing issues with anemia and obscure GIB. 


- She was previously admitted in June 2017 and underwent Small bowel enteroscopy

/incomplete colonoscopy 


 (6/1/17) which noted thickened fold in the duodenum, otherwise normal 

enteroscopy and unfortunately the scope 


 was unable to be passed beyond the sigmoid 25 cm secondary to either twisted 

colon, spasm or external 


 compression, it was felt to not be safe to push the scope without seeing the 

lumen because of fear of perforation 


 so endoscopy was terminated. 


- As an outpt she had Capsule endoscopy (8/1/17) which noted multiple 

angiectasia with active bleeding and pt 


 was sent for antegrade single balloon enteroscopy (8/7/17) but unfortunately 

there was no active bleeding found


 and the enteroscopy was found to be normal except for a hiatal hernia. 


- Pts outpt labs had been stable with Hgb ranging between 9-10. Her last labs 

on 8/24 noted Hgb 9.3. 


- She reports that she developed increased SOB  with increased weakness and has 

required


 the use of her walker. Pt reports that her stools are always dark as she takes 

iron supplements. 


- Her labs in the ED noted a drop in her H/H to 5.6/17.4. 


- Iron studies prior to transfusion noted Serum Fe 35, TIBC 416, %Sat 8.4, 

Ferritin 20


- Pt was transfused with 4 units of PRBCs with improvement 


- Monitor closely for any signs of volume overload with transfusion


- Hold ASA/Plavix which were started during last admission for trial before TAVR


- Discussed with Dr. Low will discontinue aspirin and Plavix


- DVT prophylaxis with SCDs


- GI and Hematology following also following


- GI recommending double balloon enteroscopy to be performed at a tertiary 

center, can be arranged as outpt


- Hematology recommending  transfuse iron sucrose x 3 days total


- Pt received another 2 units of PRBCs on 9/1/17


- Repeat H/H is 9.8/28.6





- recheck labs in AM





(2) GI bleed


ICD Codes:  K92.2 - Gastrointestinal hemorrhage, unspecified


Status:  Resolved


Plan:  


- See above





(3) Aortic valve stenosis, severe


ICD Codes:  I35.0 - Nonrheumatic aortic (valve) stenosis


Status:  Acute


Plan:  


- Her last admission was from 8/18-8/21 for SOB and felt to have acute CHF 

symptoms related to 


 severe AVS.  Pt was diuresed with improvement. 


- 2D echocardiogram (8/19/17) which noted severe aortic valve stenosis and she 

was initially decompensated. 


- She was evaluated with a  LHC and consideration for AVR and this was 

performed on 8/21 and showed critical


 AS but nonobstructive CAD. 


- She was seen by CTS who did the initial evaluation for TAVR vs traditional 

AVR.


- It was decided to send the pt home on ACE/diuretic/ASA/Plavix and monitored 

for bleeding as she has a hx of previous GIB. 





(4) Hypertension


ICD Codes:  I10 - Essential (primary) hypertension


Status:  Chronic


Plan:  


- Home meds continued with hold parameters


- BP better today





(5) Hyperlipidemia


ICD Codes:  E78.5 - Hyperlipidemia, unspecified


Status:  Chronic


Plan:  


- Home meds continued





(6) CHF (congestive heart failure)


ICD Codes:  I50.9 - Heart failure, unspecified


Status:  Acute


Plan:  continue lasix 20 mg daily





Assessment and Plan


Patient examined.


Assessment and plan formulated with Kathryn Mcgowan PA-C.


I agree with the above.





Pt will need referal to [facility] for double balloon enteroscopy.


Can not resume ASA/plavix at this time.


If pt's Hg remains stable in AM, then will d/c to home with [facility]


repeat CBC with [facility] on 9/4/17





Problem Qualifiers





(1) CHF (congestive heart failure):  








Kathryn Mcgowan Sep 1, 2017 15:35


Zia Puckett DO Sep 2, 2017 00:57

## 2017-09-02 VITALS
SYSTOLIC BLOOD PRESSURE: 101 MMHG | TEMPERATURE: 97 F | OXYGEN SATURATION: 97 % | HEART RATE: 85 BPM | DIASTOLIC BLOOD PRESSURE: 57 MMHG | RESPIRATION RATE: 18 BRPM

## 2017-09-02 VITALS
SYSTOLIC BLOOD PRESSURE: 130 MMHG | TEMPERATURE: 97.4 F | RESPIRATION RATE: 17 BRPM | DIASTOLIC BLOOD PRESSURE: 72 MMHG | OXYGEN SATURATION: 93 % | HEART RATE: 84 BPM

## 2017-09-02 VITALS
OXYGEN SATURATION: 96 % | RESPIRATION RATE: 18 BRPM | SYSTOLIC BLOOD PRESSURE: 119 MMHG | HEART RATE: 83 BPM | DIASTOLIC BLOOD PRESSURE: 63 MMHG | TEMPERATURE: 96.9 F

## 2017-09-02 VITALS — SYSTOLIC BLOOD PRESSURE: 87 MMHG | DIASTOLIC BLOOD PRESSURE: 59 MMHG

## 2017-09-02 VITALS
OXYGEN SATURATION: 96 % | HEART RATE: 61 BPM | SYSTOLIC BLOOD PRESSURE: 100 MMHG | RESPIRATION RATE: 18 BRPM | DIASTOLIC BLOOD PRESSURE: 56 MMHG

## 2017-09-02 VITALS
OXYGEN SATURATION: 94 % | HEART RATE: 87 BPM | DIASTOLIC BLOOD PRESSURE: 56 MMHG | RESPIRATION RATE: 16 BRPM | SYSTOLIC BLOOD PRESSURE: 100 MMHG | TEMPERATURE: 97.7 F

## 2017-09-02 LAB
BASOPHILS # BLD AUTO: 0 TH/MM3 (ref 0–0.2)
BASOPHILS NFR BLD: 0.4 % (ref 0–2)
EOSINOPHIL # BLD: 0.5 TH/MM3 (ref 0–0.4)
EOSINOPHIL NFR BLD: 4.7 % (ref 0–4)
ERYTHROCYTE [DISTWIDTH] IN BLOOD BY AUTOMATED COUNT: 22.6 % (ref 11.6–17.2)
HCT VFR BLD CALC: 28.8 % (ref 35–46)
HEMO FLAGS: (no result)
LYMPHOCYTES # BLD AUTO: 1.8 TH/MM3 (ref 1–4.8)
LYMPHOCYTES NFR BLD AUTO: 18.1 % (ref 9–44)
MCH RBC QN AUTO: 29.9 PG (ref 27–34)
MCHC RBC AUTO-ENTMCNC: 34.3 % (ref 32–36)
MCV RBC AUTO: 87.2 FL (ref 80–100)
MONOCYTES NFR BLD: 6.6 % (ref 0–8)
NEUTROPHILS # BLD AUTO: 6.9 TH/MM3 (ref 1.8–7.7)
NEUTROPHILS NFR BLD AUTO: 70.2 % (ref 16–70)
PLAT MORPH BLD: NORMAL
PLATELET # BLD: 205 TH/MM3 (ref 150–450)
PLATELET BLD QL SMEAR: NORMAL
RBC # BLD AUTO: 3.3 MIL/MM3 (ref 4–5.3)
SCAN/DIFF: (no result)
WBC # BLD AUTO: 9.9 TH/MM3 (ref 4–11)

## 2017-09-02 RX ADMIN — PANTOPRAZOLE SODIUM SCH MG: 40 INJECTION, POWDER, FOR SOLUTION INTRAVENOUS at 08:00

## 2017-09-02 RX ADMIN — Medication SCH TAB: at 08:33

## 2017-09-02 RX ADMIN — LISINOPRIL SCH MG: 5 TABLET ORAL at 08:33

## 2017-09-02 RX ADMIN — CETIRIZINE HYDROCHLORIDE SCH MG: 10 TABLET, FILM COATED ORAL at 08:33

## 2017-09-02 RX ADMIN — POTASSIUM CHLORIDE SCH MEQ: 20 TABLET, EXTENDED RELEASE ORAL at 08:33

## 2017-09-02 RX ADMIN — Medication SCH MG: at 08:33

## 2017-09-02 RX ADMIN — Medication SCH ML: at 08:35

## 2017-09-02 RX ADMIN — ACETAMINOPHEN PRN MG: 325 TABLET ORAL at 07:31

## 2017-09-02 RX ADMIN — FUROSEMIDE SCH MG: 20 TABLET ORAL at 08:33

## 2017-09-02 RX ADMIN — SERTRALINE HYDROCHLORIDE SCH MG: 50 TABLET, FILM COATED ORAL at 08:33

## 2017-09-02 NOTE — HHI.GIFU
Subjective


Remarks


Patient is resting in bed, talking on the phone, in no acute distress, no bm 

today, no N/V or abd pain. She tells me Dr. Thibodeaux has good news for me " 

knows some one that can do double balloon enteroscopy"





Objective


Vitals I&O





Vital Signs








  Date Time  Temp Pulse Resp B/P (MAP) Pulse Ox O2 Delivery O2 Flow Rate FiO2


 


9/2/17 08:28 97.4 84 17 130/72 (91) 93   


 


9/2/17 04:15 96.9 83 18 119/63 (81) 96   


 


9/2/17 00:26 97.0 85 18 101/57 (72) 97   


 


9/1/17 20:35 97.3 88 18 114/60 (78) 98   


 


9/1/17 12:00 97.1 80 17 120/76 (91) 97   














I/O      


 


 9/1/17 9/1/17 9/1/17 9/2/17 9/2/17 9/2/17





 07:00 15:00 23:00 07:00 15:00 23:00


 


Intake Total 1010 ml 530 ml 240 ml 120 ml  


 


Balance 1010 ml 530 ml 240 ml 120 ml  


 


      


 


Intake Oral 480 ml 420 ml 240 ml 120 ml  


 


IV Total  110 ml    


 


Packed Cells 500 ml     


 


Blood Product IV Normal Saline Flush 30 ml     


 


# Voids 3 3 3 3  


 


# Bowel Movements 0 1 1 0  








Laboratory





Laboratory Tests








Test


  9/2/17


08:20


 


White Blood Count 9.9 


 


Red Blood Count 3.30 


 


Hemoglobin 9.9 


 


Hematocrit 28.8 


 


Mean Corpuscular Volume 87.2 


 


Mean Corpuscular Hemoglobin 29.9 


 


Mean Corpuscular Hemoglobin


Concent 34.3 


 


 


Red Cell Distribution Width 22.6 


 


Platelet Count 205 


 


Mean Platelet Volume 8.9 


 


Neutrophils (%) (Auto) 70.2 


 


Lymphocytes (%) (Auto) 18.1 


 


Monocytes (%) (Auto) 6.6 


 


Eosinophils (%) (Auto) 4.7 


 


Basophils (%) (Auto) 0.4 


 


Neutrophils # (Auto) 6.9 


 


Lymphocytes # (Auto) 1.8 


 


Monocytes # (Auto) 0.6 


 


Eosinophils # (Auto) 0.5 


 


Basophils # (Auto) 0.0 


 


CBC Comment AUTO DIFF 








Imaging





Last Impressions








Chest X-Ray 8/30/17 0600 Signed





Impressions: 





 Service Date/Time:  Wednesday, August 30, 2017 06:22 - CONCLUSION: No acute 





 disease.       Madhu Frank MD 








Physical Exam


HEENT: Normocephalic; atraumatic 


CHEST:  CTA


CARDIAC:  RRR, systolic murmur


ABDOMEN:  Soft, nondistended, nontender; no hepatosplenomegaly; bowel sounds 

are present in all four quadrants.


EXTREMITIES: No clubbing, cyanosis, or edema.


SKIN:  Normal; no rash; no jaundice.


CNS:  No focal deficits; alert and oriented times three.





Assessment and Plan


Plan


ASSESSMENT


- GIB, Melena.  Recent started on plavix/asa.  S/P Extensive GI workup.  

Colonoscopy (2/10/16) --> Very tortuous, edematous sigmoid colon, possible 


  diverticular colitis, diminutive polyps in the ascending colon, sigmoid 

diverticulosis, grade I hemorrhoids, decreased sphincter tone. EGD/colonoscopy (

9/21/16) --> 


  gastritis, HH, esophagitis, diverticulosis, hemorrhoids, polyps, incomplete 

colonoscopy.  Colonoscopy (9/23/16) --> Severe diverticulosis in the left colon

, colitis, 


  hemorrhoids, small polyp.  Colonoscopy (6/1/17) --> Scope was unable to be 

passed beyond the sigmoid 25 cm secondary to either twisted colon, spasm or 

external 


  compression, it was felt to not be safe to push the scope without seeing the 

lumen because of fear of perforation so endoscopy was terminated. Small bowel 


  enteroscopy (6/1/17) --> thickened fold in the duodenum Bx done otherwise 

normal.  Capsule endoscopy (8/1/17) --> Multiple angiectasia with active 

bleeding.  


  Antegrade single balloon enteroscopy (8/7/17) --> Normal enteroscopy except 

for a hiatal hernia.  Presented with severe anemia,


  5.6/17.4 on admission, S/P 6 units PRBC.  HH stable 9.8/28.6. On PPI with BID 

dosing. Off plavix/ASA. 


- Severe anemia.  5.6/17.4 on admission, S/P 6 units PRBC.  HH stable 9.9/28.8, 

no bleeding today.   Hematology following


- Severe aortic stenosis, CHF, HTN. per cardiology





PLAN:


- ZULAY


- Cont. PPI


- Monitor HH


- Hgb is stable, no melena today. okay to [place] home and f/u as an OP in [facility] per Dr. Thibodeaux recommendations who is going to arrange for 

double balloon enteroscopy.


- Plan for referral to tertiary as outpatient for Double balloon enteroscopy


- Pt seen and examined by Dr. Thibodeaux and myself and this note is written on 

his behalf











Kimber Casas Sep 2, 2017 09:44

## 2017-09-02 NOTE — HHI.PR
Subjective


Remarks


Hgb today 9.9- stable


no active signs of bleeding


reports feeling well





Objective


Vitals





Vital Signs








  Date Time  Temp Pulse Resp B/P (MAP) Pulse Ox O2 Delivery O2 Flow Rate FiO2


 


9/2/17 11:28 97.7 87 16 100/56 (71) 94   


 


9/2/17 08:28 97.4 84 17 130/72 (91) 93   


 


9/2/17 04:15 96.9 83 18 119/63 (81) 96   


 


9/2/17 00:26 97.0 85 18 101/57 (72) 97   


 


9/1/17 20:35 97.3 88 18 114/60 (78) 98   








Result Diagram:  


9/2/17 0820                                                                    

            9/1/17 0706





Other Results





 Laboratory Tests








Test


  8/31/17


14:45 9/1/17


07:06 9/2/17


08:20


 


White Blood Count 10.2 TH/MM3  11.0 TH/MM3  9.9 TH/MM3 


 


Red Blood Count 2.62 MIL/MM3  3.35 MIL/MM3  3.30 MIL/MM3 


 


Hemoglobin 7.2 GM/DL  9.8 GM/DL  9.9 GM/DL 


 


Hematocrit 22.1 %  28.6 %  28.8 % 


 


Mean Corpuscular Volume 84.0 FL  85.4 FL  87.2 FL 


 


Mean Corpuscular Hemoglobin 27.5 PG  29.1 PG  29.9 PG 


 


Mean Corpuscular Hemoglobin


Concent 32.7 % 


  34.1 % 


  34.3 % 


 


 


Red Cell Distribution Width 23.7 %  22.5 %  22.6 % 


 


Platelet Count 198 TH/MM3  187 TH/MM3  205 TH/MM3 


 


Mean Platelet Volume 8.5 FL  8.7 FL  8.9 FL 


 


Neutrophils (%) (Auto) 77.5 %  74.1 %  70.2 % 


 


Lymphocytes (%) (Auto) 14.6 %  15.8 %  18.1 % 


 


Monocytes (%) (Auto) 5.1 %  6.1 %  6.6 % 


 


Eosinophils (%) (Auto) 2.3 %  3.5 %  4.7 % 


 


Basophils (%) (Auto) 0.5 %  0.5 %  0.4 % 


 


Neutrophils # (Auto) 7.9 TH/MM3  8.2 TH/MM3  6.9 TH/MM3 


 


Lymphocytes # (Auto) 1.5 TH/MM3  1.7 TH/MM3  1.8 TH/MM3 


 


Monocytes # (Auto) 0.5 TH/MM3  0.7 TH/MM3  0.6 TH/MM3 


 


Eosinophils # (Auto) 0.2 TH/MM3  0.4 TH/MM3  0.5 TH/MM3 


 


Basophils # (Auto) 0.0 TH/MM3  0.0 TH/MM3  0.0 TH/MM3 


 


CBC Comment AUTO DIFF  AUTO DIFF  AUTO DIFF 


 


Differential Comment


  AUTO DIFF


CONFIRMED FINAL DIFF


MANUAL AUTO DIFF


CONFIRMED


 


Differential Total Cells


Counted 


  100 


  


 


 


Neutrophils % (Manual)  68 %  


 


Band Neutrophils %  6 %  


 


Lymphocytes %  16 %  


 


Monocytes %  5 %  


 


Eosinophils %  3 %  


 


Neutrophils # (Manual)  8.4 TH/MM3  


 


Myelocytes  2 %  


 


Platelet Estimate  NORMAL  NORMAL 


 


Platelet Morphology Comment  NORMAL  NORMAL 


 


Polychromasia  2.9 %  


 


Acanthocytes  OCC  


 


Blood Urea Nitrogen  22 MG/DL  


 


Creatinine  0.89 MG/DL  


 


Random Glucose  91 MG/DL  


 


Calcium Level  8.0 MG/DL  


 


Sodium Level  138 MEQ/L  


 


Potassium Level  4.0 MEQ/L  


 


Chloride Level  106 MEQ/L  


 


Carbon Dioxide Level  24.1 MEQ/L  


 


Anion Gap  8 MEQ/L  


 


Estimat Glomerular Filtration


Rate 


  60 ML/MIN 


  


 








Imaging





Last Impressions








Chest X-Ray 8/28/17 1629 Signed





Impressions: 





 Service Date/Time:  Monday, August 28, 2017 16:44 - CONCLUSION: No acute 





 disease.       Tanner Vann MD  FACR








Objective Remarks


General: in NAD


Cardiac: Regular rate and rhythm 4/6 systolic ejection murmur


Respiratory:  cta


GI: abd soft nontender


Extremities: ext no edema





A/P


Problem List:  


(1) Symptomatic anemia


ICD Codes:  D64.9 - Anemia, unspecified


Status:  Chronic


Plan:  - Pt is an 85 y/o WF with iron deficiency anemia/GIB/SB angioectasia, 

severe aortic stenosis, hx of CHF, 


 HTN, and hyperlipidemia.  


- Pt has had ongoing issues with anemia and obscure GIB. 


- She was previously admitted in June 2017 and underwent Small bowel enteroscopy

/incomplete colonoscopy 


 (6/1/17) which noted thickened fold in the duodenum, otherwise normal 

enteroscopy and unfortunately the scope 


 was unable to be passed beyond the sigmoid 25 cm secondary to either twisted 

colon, spasm or external 


 compression, it was felt to not be safe to push the scope without seeing the 

lumen because of fear of perforation 


 so endoscopy was terminated. 


- As an outpt she had Capsule endoscopy (8/1/17) which noted multiple 

angiectasia with active bleeding and pt 


 was sent for antegrade single balloon enteroscopy (8/7/17) but unfortunately 

there was no active bleeding found


 and the enteroscopy was found to be normal except for a hiatal hernia. 


- Pts outpt labs had been stable with Hgb ranging between 9-10. Her last labs 

on 8/24 noted Hgb 9.3. 


- She reports that she developed increased SOB  with increased weakness and has 

required


 the use of her walker. Pt reports that her stools are always dark as she takes 

iron supplements. 


- Her labs in the ED noted a drop in her H/H to 5.6/17.4. 


- Iron studies prior to transfusion noted Serum Fe 35, TIBC 416, %Sat 8.4, 

Ferritin 20


- Pt was transfused with 4 units of PRBCs with improvement 


- Monitor closely for any signs of volume overload with transfusion


- Hold ASA/Plavix which were started during last admission for trial before TAVR


- Discussed with Dr. Low will discontinue aspirin and Plavix


- DVT prophylaxis with SCDs


- GI and Hematology following also following


- GI recommending double balloon enteroscopy to be performed at a tertiary 

center, can be arranged as outpt


- Hematology recommending  transfuse iron sucrose x 3 days total


- Pt received another 2 units of PRBCs on 9/1/17


- Repeat H/H is 9.9/28.8, stable








(2) GI bleed


ICD Codes:  K92.2 - Gastrointestinal hemorrhage, unspecified


Status:  Resolved


Plan:  


- See above





(3) Aortic valve stenosis, severe


ICD Codes:  I35.0 - Nonrheumatic aortic (valve) stenosis


Status:  Acute


Plan:  


- Her last admission was from 8/18-8/21 for SOB and felt to have acute CHF 

symptoms related to 


 severe AVS.  Pt was diuresed with improvement. 


- 2D echocardiogram (8/19/17) which noted severe aortic valve stenosis and she 

was initially decompensated. 


- She was evaluated with a  LHC and consideration for AVR and this was 

performed on 8/21 and showed critical


 AS but nonobstructive CAD. 


- She was seen by CTS who did the initial evaluation for TAVR vs traditional 

AVR.


- It was decided to send the pt home on ACE/diuretic/ASA/Plavix and monitored 

for bleeding as she has a hx of previous GIB. 





(4) Hypertension


ICD Codes:  I10 - Essential (primary) hypertension


Status:  Chronic


Plan:  


- Initially Home meds continued- lisinopril 2.5 mg daily


- hypotensive will DC Lisinopril





(5) Hyperlipidemia


ICD Codes:  E78.5 - Hyperlipidemia, unspecified


Status:  Chronic


Plan:  


- Home meds continued





(6) CHF (congestive heart failure)


ICD Codes:  I50.9 - Heart failure, unspecified


Status:  Acute


Plan:  continue lasix 20 mg daily





Assessment and Plan


Patient examined.


Assessment and plan formulated with Lyly Chacon PA-C.


I agree with the above.





Problem Qualifiers





(1) CHF (congestive heart failure):  








Lyly Chacon Sep 2, 2017 13:30


Zia Puckett DO Sep 2, 2017 22:55

## 2017-09-02 NOTE — HHI.FF
Face to Face Verification


Diagnosis:  


(1) Macular degeneration


(2) Gastritis


(3) Anemia


Physical Therapy


Order:  Evaluate and Treat, Improve ambulation, Strength and gait training





Home Health Nursing








Order: Medical education





 Signs/symptoms of disease process





 Nursing assessment with vital signs

















I have seen patient Lisa Dillon on 9/2/17. My clinical findings 

support the need for the requested home health care services because:


Limited ability to care for self


high fall risk








 Limited ability to care for self





 High risk of falls














I certify that my clinical findings support that this patient is homebound 

because:


Unsteady gate/balance








 Unsteady gait/balance

















Lyly Chacon Sep 2, 2017 13:24

## 2017-09-02 NOTE — HHI.DS
Discharge Summary


Admission Date


Aug 28, 2017 at 17:49


Discharge Date:  Sep 2, 2017


Admitting Diagnosis





severe anemia, generalized weakness





(1) Symptomatic anemia


ICD Codes:  D64.9 - Anemia, unspecified


Status:  Chronic


(2) GI bleed


ICD Codes:  K92.2 - Gastrointestinal hemorrhage, unspecified


Status:  Resolved


(3) Aortic valve stenosis, severe


ICD Codes:  I35.0 - Nonrheumatic aortic (valve) stenosis


Status:  Acute


(4) Hypertension


ICD Codes:  I10 - Essential (primary) hypertension


Status:  Chronic


(5) Hyperlipidemia


ICD Codes:  E78.5 - Hyperlipidemia, unspecified


Status:  Chronic


(6) CHF (congestive heart failure)


ICD Codes:  I50.9 - Heart failure, unspecified


Status:  Acute


Consultants


Dr. Newton hematology


Dr. Desai Gastroenterology


Dr. Low Cardiology


Brief History


Mrs. Dillon is a pleasant 83 y/o WF with iron deficiency anemia/GIB/SB 

angioectasia, severe aortic stenosis, hx of CHF, HTN, and hyperlipidemia. She 

has had multiple recent admissions. Her last admission was from 8/18-8/21 for 

SOB and felt to have acute CHF symptoms related to severe AVS. She had an 

echocardiogram (8/19/17) which noted severe aortic valve stenosis and she was 

initially decompensated. Pt was diuresed with improvement. She was evaluated 

with a  LHC and consideration for AVR and this was performed on 8/21 and showed 

critical AS but nonobstructive CAD. She was seen by CTS who did the initial 

evaluation for TAVR vs traditional AVR. It was decided to send the pt home on 

ACE/diuretic/ASA/Plavix and monitored for bleeding as she has a hx of previous 

GIB. Pt has had ongoing issues with anemia and possible GIB. She was previously 

admitted in June 2017 and underwent Small bowel enteroscopy/incomplete 

colonoscopy (6/1/17) which noted thickened fold in the duodenum, otherwise 

normal enteroscopy and unfortunately the scope was unable to be passed beyond 

the sigmoid 25 cm secondary to either twisted colon, spasm or external 

compression, it was felt to not be safe to push the scope without seeing the 

lumen because of fear of perforation so endoscopy was terminated. As an outpt 

she had Capsule endoscopy (8/1/17) which noted multiple angiectasia with active 

bleeding and pt was sent for antegrade single balloon enteroscopy (8/7/17) but 

unfortunately there was no active bleeding found and the enteroscopy was found 

to be normal except for a hiatal hernia. Pts outpt labs had been stable with 

Hgb ranging between 9-10. Her last labs on 8/24 noted Hgb 9.3. She reports that 

she developed increased SOB which started yesterday with increased weakness and 

has required the use of her walker. Pt reports that her stools are always dark 

as she takes iron supplements. Her labs in the ED noted a drop in her H/H to 5.6

/17.4. She denies any change in her bowel habits, nausea/vomiting, reflux, 

dysphagia, or BRBPR.


CBC/BMP:  


9/2/17 0820                                                                    

            9/1/17 0706





Significant Findings





Laboratory Tests








Test


  8/31/17


14:45 9/1/17


07:06 9/2/17


08:20


 


Red Blood Count


  2.62 MIL/MM3


(4.00-5.30) 3.35 MIL/MM3


(4.00-5.30) 3.30 MIL/MM3


(4.00-5.30)


 


Hemoglobin


  7.2 GM/DL


(11.6-15.3) 9.8 GM/DL


(11.6-15.3) 9.9 GM/DL


(11.6-15.3)


 


Hematocrit


  22.1 %


(35.0-46.0) 28.6 %


(35.0-46.0) 28.8 %


(35.0-46.0)


 


Red Cell Distribution Width


  23.7 %


(11.6-17.2) 22.5 %


(11.6-17.2) 22.6 %


(11.6-17.2)


 


Neutrophils (%) (Auto)


  77.5 %


(16.0-70.0) 74.1 %


(16.0-70.0) 70.2 %


(16.0-70.0)


 


Neutrophils # (Auto)


  7.9 TH/MM3


(1.8-7.7) 8.2 TH/MM3


(1.8-7.7) 


 


 


Neutrophils # (Manual)


  


  8.4 TH/MM3


(1.8-7.7) 


 


 


Myelocytes  2 % (0-0)  


 


Polychromasia


  


  2.9 %


(0.0-1.9) 


 


 


Acanthocytes  OCC (NORMAL)  


 


Blood Urea Nitrogen


  


  22 MG/DL


(7-18) 


 


 


Calcium Level


  


  8.0 MG/DL


(8.5-10.1) 


 


 


Estimat Glomerular Filtration


Rate 


  60 ML/MIN


(>89) 


 


 


Eosinophils (%) (Auto)


  


  


  4.7 %


(0.0-4.0)


 


Eosinophils # (Auto)


  


  


  0.5 TH/MM3


(0-0.4)








Imaging





Last Impressions








Chest X-Ray 8/30/17 0600 Signed





Impressions: 





 Service Date/Time:  Wednesday, August 30, 2017 06:22 - CONCLUSION: No acute 





 disease.       Madhu Frank MD 








PE at Discharge


General: in NAD


Cardiac: Regular rate and rhythm 4/6 systolic ejection murmur


Respiratory:  cta


GI: abd soft nontender


Extremities: ext no edema


Hospital Course


Symptomatic anemia


 - Pt is an 83 y/o WF with iron deficiency anemia/GIB/SB angioectasia, severe 

aortic stenosis, hx of CHF, 


 HTN, and hyperlipidemia.  


- Pt has had ongoing issues with anemia and obscure GIB. 


- She was previously admitted in June 2017 and underwent Small bowel enteroscopy

/incomplete colonoscopy 


 (6/1/17) which noted thickened fold in the duodenum, otherwise normal 

enteroscopy and unfortunately the scope 


 was unable to be passed beyond the sigmoid 25 cm secondary to either twisted 

colon, spasm or external 


 compression, it was felt to not be safe to push the scope without seeing the 

lumen because of fear of perforation 


 so endoscopy was terminated. 


- As an outpt she had Capsule endoscopy (8/1/17) which noted multiple 

angiectasia with active bleeding and pt 


 was sent for antegrade single balloon enteroscopy (8/7/17) but unfortunately 

there was no active bleeding found


 and the enteroscopy was found to be normal except for a hiatal hernia. 


- Pts outpt labs had been stable with Hgb ranging between 9-10. Her last labs 

on 8/24 noted Hgb 9.3. 


- She reports that she developed increased SOB  with increased weakness and has 

required


 the use of her walker. Pt reports that her stools are always dark as she takes 

iron supplements. 


- Her labs in the ED noted a drop in her H/H to 5.6/17.4. 


- Iron studies prior to transfusion noted Serum Fe 35, TIBC 416, %Sat 8.4, 

Ferritin 20


- Pt was transfused with 4 units of PRBCs with improvement 


- Monitor closely for any signs of volume overload with transfusion


- Hold ASA/Plavix which were started during last admission for trial before TAVR


- Discussed with Dr. Low will discontinue aspirin and Plavix


- DVT prophylaxis with SCDs


- GI and Hematology following also following


- GI recommending double balloon enteroscopy to be performed at a tertiary 

center, can be arranged as outpt


- Hematology recommending  transfuse iron sucrose x 3 days total


- Pt received another 2 units of PRBCs on 9/1/17


- Repeat H/H is 9.9/28.8, stable


- Given patient's age, recurrent GI bleed in difficult location and need for 

double balloon enteroscopy she has a high risk for return to the hospital








GI bleed


- See above





Aortic valve stenosis, severe


- Her last admission was from 8/18-8/21 for SOB and felt to have acute CHF 

symptoms related to 


 severe AVS.  Pt was diuresed with improvement. 


- 2D echocardiogram (8/19/17) which noted severe aortic valve stenosis and she 

was initially decompensated. 


- She was evaluated with a  LHC and consideration for AVR and this was 

performed on 8/21 and showed critical


 AS but nonobstructive CAD. 


- She was seen by CTS who did the initial evaluation for TAVR vs traditional 

AVR.


- It was decided to send the pt home on ACE/diuretic/ASA/Plavix and monitored 

for bleeding as she has a hx of previous GIB. 





Hypertension


- Initially Home meds continued- lisinopril 2.5 mg daily


- hypotensive will DC Lisinopril





Hyperlipidemia


- Home meds continued





CHF (congestive heart failure)


continue lasix 20 mg daily


Pt Condition on Discharge:  Stable


Discharge Disposition:  Disch w/ Home Health Serv


Discharge Instructions


DIET: Follow Instructions for:  Heart Healthy Diet


Activities you can perform:  Regular-No Restrictions


Follow up Referrals:  


Gastroenterology - 1 Week with Dr. Umanzor


Oncology/Hematology - 1 Week with Dr. Newton


PCP Follow-up - 1 Week with Dr. Lisandra Cook





Continued Medications:  


Alprazolam (Xanax) 0.25 Mg Tab


0.25 MG PO TID PRN for ANXIETY, TAB 0 Refills





Atorvastatin (Atorvastatin) 20 Mg Tab


20 MG PO HS for Cholesterol Management, #30 TAB 0 Refills





Cetirizine (Cetirizine) 10 Mg Tab


10 MG PO DAILY for Allergies, TAB 0 Refills





Furosemide (Furosemide) 20 Mg Tab


20 MG PO DAILY for heart disease, #30 TAB 3 Refills





Multiple Vitamins W/ Minerals (Ocuvite) 1 Tab


1 TAB PO DAILY for Nutritional Supplement, TAB 0 Refills





Pantoprazole (Pantoprazole) 40 Mg Tab


40 MG PO DAILY for Reflux, #30 TAB 0 Refills





Polysaccharide Iron Complex (Poly-Iron 150) 150 Mg Iron Cap


150 MG PO DAILY for Nutritional Supplement, #30 CAP 0 Refills





Potassium Chloride Microencaps (Potassium Chloride Microencaps) 20 Meq Tab


20 MEQ PO DAILY for supplement, #30 TAB 3 Refills





Sertraline (Zoloft) 50 Mg Tab


150 MG PO DAILY for anxiety, #30 TAB





 


Discontinued Medications:  


Aspirin DR (Adult Aspirin EC Low Strength) 81 Mg Tabec


81 MG PO DAILY for heart disease for 90 Days, TAB





Clopidogrel (Plavix) 75 Mg Tab


75 MG PO DAILY for heart disease, #30 TAB 3 Refills





Lisinopril (Lisinopril) 5 Mg Tab


2.5 MG PO DAILY for heart disease, #30 TAB 3 Refills








Additional Information


Patient examined.


Assessment and plan formulated with Lyly Chacon PA-C.


I agree with the above.











Lyly Chacon Sep 2, 2017 14:27


Zia Puckett DO Sep 2, 2017 22:56

## 2017-11-26 ENCOUNTER — HOSPITAL ENCOUNTER (OUTPATIENT)
Dept: HOSPITAL 17 - NEPE | Age: 82
Setting detail: OBSERVATION
LOS: 1 days | Discharge: HOME | End: 2017-11-27
Attending: HOSPITALIST | Admitting: HOSPITALIST
Payer: MEDICARE

## 2017-11-26 VITALS
DIASTOLIC BLOOD PRESSURE: 69 MMHG | SYSTOLIC BLOOD PRESSURE: 129 MMHG | HEART RATE: 68 BPM | OXYGEN SATURATION: 98 % | RESPIRATION RATE: 18 BRPM | TEMPERATURE: 98.4 F

## 2017-11-26 VITALS
RESPIRATION RATE: 16 BRPM | SYSTOLIC BLOOD PRESSURE: 107 MMHG | HEART RATE: 85 BPM | TEMPERATURE: 98.3 F | DIASTOLIC BLOOD PRESSURE: 66 MMHG | OXYGEN SATURATION: 95 %

## 2017-11-26 VITALS
HEART RATE: 85 BPM | RESPIRATION RATE: 16 BRPM | OXYGEN SATURATION: 97 % | TEMPERATURE: 98.3 F | SYSTOLIC BLOOD PRESSURE: 128 MMHG | DIASTOLIC BLOOD PRESSURE: 61 MMHG

## 2017-11-26 VITALS
HEART RATE: 78 BPM | RESPIRATION RATE: 18 BRPM | OXYGEN SATURATION: 97 % | DIASTOLIC BLOOD PRESSURE: 74 MMHG | SYSTOLIC BLOOD PRESSURE: 113 MMHG | TEMPERATURE: 98.2 F

## 2017-11-26 VITALS
HEART RATE: 83 BPM | DIASTOLIC BLOOD PRESSURE: 52 MMHG | OXYGEN SATURATION: 93 % | RESPIRATION RATE: 18 BRPM | SYSTOLIC BLOOD PRESSURE: 102 MMHG

## 2017-11-26 VITALS — WEIGHT: 160.94 LBS | HEIGHT: 64 IN | BODY MASS INDEX: 27.48 KG/M2

## 2017-11-26 VITALS
OXYGEN SATURATION: 99 % | SYSTOLIC BLOOD PRESSURE: 130 MMHG | HEART RATE: 90 BPM | RESPIRATION RATE: 16 BRPM | DIASTOLIC BLOOD PRESSURE: 57 MMHG

## 2017-11-26 VITALS — HEART RATE: 88 BPM

## 2017-11-26 VITALS — OXYGEN SATURATION: 97 %

## 2017-11-26 VITALS — HEART RATE: 86 BPM

## 2017-11-26 DIAGNOSIS — M48.061: ICD-10-CM

## 2017-11-26 DIAGNOSIS — I11.0: ICD-10-CM

## 2017-11-26 DIAGNOSIS — R07.9: Primary | ICD-10-CM

## 2017-11-26 DIAGNOSIS — D50.9: ICD-10-CM

## 2017-11-26 DIAGNOSIS — M19.90: ICD-10-CM

## 2017-11-26 DIAGNOSIS — R06.03: ICD-10-CM

## 2017-11-26 DIAGNOSIS — I25.10: ICD-10-CM

## 2017-11-26 DIAGNOSIS — K92.2: ICD-10-CM

## 2017-11-26 DIAGNOSIS — K44.9: ICD-10-CM

## 2017-11-26 DIAGNOSIS — Z79.01: ICD-10-CM

## 2017-11-26 DIAGNOSIS — J00: ICD-10-CM

## 2017-11-26 DIAGNOSIS — E78.00: ICD-10-CM

## 2017-11-26 DIAGNOSIS — I99.8: ICD-10-CM

## 2017-11-26 DIAGNOSIS — F32.9: ICD-10-CM

## 2017-11-26 DIAGNOSIS — K21.9: ICD-10-CM

## 2017-11-26 DIAGNOSIS — R06.02: ICD-10-CM

## 2017-11-26 DIAGNOSIS — Z79.899: ICD-10-CM

## 2017-11-26 DIAGNOSIS — R06.2: ICD-10-CM

## 2017-11-26 DIAGNOSIS — I50.9: ICD-10-CM

## 2017-11-26 DIAGNOSIS — I35.0: ICD-10-CM

## 2017-11-26 DIAGNOSIS — F41.9: ICD-10-CM

## 2017-11-26 DIAGNOSIS — R05: ICD-10-CM

## 2017-11-26 LAB
ALP SERPL-CCNC: 76 U/L (ref 45–117)
ALT SERPL-CCNC: 27 U/L (ref 10–53)
ANION GAP SERPL CALC-SCNC: 7 MEQ/L (ref 5–15)
APTT BLD: 22.2 SEC (ref 24.3–30.1)
AST SERPL-CCNC: 35 U/L (ref 15–37)
BASOPHILS # BLD AUTO: 0 TH/MM3 (ref 0–0.2)
BASOPHILS NFR BLD: 0.6 % (ref 0–2)
BILIRUB SERPL-MCNC: 0.3 MG/DL (ref 0.2–1)
BUN SERPL-MCNC: 19 MG/DL (ref 7–18)
CHLORIDE SERPL-SCNC: 103 MEQ/L (ref 98–107)
CK SERPL-CCNC: 64 U/L (ref 26–192)
EOSINOPHIL # BLD: 0.1 TH/MM3 (ref 0–0.4)
EOSINOPHIL NFR BLD: 1.5 % (ref 0–4)
ERYTHROCYTE [DISTWIDTH] IN BLOOD BY AUTOMATED COUNT: 20.3 % (ref 11.6–17.2)
GFR SERPLBLD BASED ON 1.73 SQ M-ARVRAT: 67 ML/MIN (ref 89–?)
HCO3 BLD-SCNC: 28.5 MEQ/L (ref 21–32)
HCT VFR BLD CALC: 26.9 % (ref 35–46)
HEMO FLAGS: (no result)
INR PPP: 0.9 RATIO
LYMPHOCYTES # BLD AUTO: 1.3 TH/MM3 (ref 1–4.8)
LYMPHOCYTES NFR BLD AUTO: 21.1 % (ref 9–44)
MAGNESIUM SERPL-MCNC: 2.2 MG/DL (ref 1.5–2.5)
MCH RBC QN AUTO: 27.2 PG (ref 27–34)
MCHC RBC AUTO-ENTMCNC: 32 % (ref 32–36)
MCV RBC AUTO: 85 FL (ref 80–100)
MONOCYTES NFR BLD: 6.8 % (ref 0–8)
NEUTROPHILS # BLD AUTO: 4.5 TH/MM3 (ref 1.8–7.7)
NEUTROPHILS NFR BLD AUTO: 70 % (ref 16–70)
PLATELET # BLD: 379 TH/MM3 (ref 150–450)
POTASSIUM SERPL-SCNC: 4.6 MEQ/L (ref 3.5–5.1)
PROTHROMBIN TIME: 10.2 SEC (ref 9.8–11.6)
RBC # BLD AUTO: 3.16 MIL/MM3 (ref 4–5.3)
SODIUM SERPL-SCNC: 138 MEQ/L (ref 136–145)
WBC # BLD AUTO: 6.4 TH/MM3 (ref 4–11)

## 2017-11-26 PROCEDURE — 85025 COMPLETE CBC W/AUTO DIFF WBC: CPT

## 2017-11-26 PROCEDURE — 85730 THROMBOPLASTIN TIME PARTIAL: CPT

## 2017-11-26 PROCEDURE — 96376 TX/PRO/DX INJ SAME DRUG ADON: CPT

## 2017-11-26 PROCEDURE — 83690 ASSAY OF LIPASE: CPT

## 2017-11-26 PROCEDURE — 86850 RBC ANTIBODY SCREEN: CPT

## 2017-11-26 PROCEDURE — 86900 BLOOD TYPING SEROLOGIC ABO: CPT

## 2017-11-26 PROCEDURE — 86901 BLOOD TYPING SEROLOGIC RH(D): CPT

## 2017-11-26 PROCEDURE — 82550 ASSAY OF CK (CPK): CPT

## 2017-11-26 PROCEDURE — 71010: CPT

## 2017-11-26 PROCEDURE — 84484 ASSAY OF TROPONIN QUANT: CPT

## 2017-11-26 PROCEDURE — 96361 HYDRATE IV INFUSION ADD-ON: CPT

## 2017-11-26 PROCEDURE — 85610 PROTHROMBIN TIME: CPT

## 2017-11-26 PROCEDURE — 80048 BASIC METABOLIC PNL TOTAL CA: CPT

## 2017-11-26 PROCEDURE — 83880 ASSAY OF NATRIURETIC PEPTIDE: CPT

## 2017-11-26 PROCEDURE — 86920 COMPATIBILITY TEST SPIN: CPT

## 2017-11-26 PROCEDURE — 80053 COMPREHEN METABOLIC PANEL: CPT

## 2017-11-26 PROCEDURE — G0378 HOSPITAL OBSERVATION PER HR: HCPCS

## 2017-11-26 PROCEDURE — 36430 TRANSFUSION BLD/BLD COMPNT: CPT

## 2017-11-26 PROCEDURE — 96374 THER/PROPH/DIAG INJ IV PUSH: CPT

## 2017-11-26 PROCEDURE — 93005 ELECTROCARDIOGRAM TRACING: CPT

## 2017-11-26 PROCEDURE — P9016 RBC LEUKOCYTES REDUCED: HCPCS

## 2017-11-26 PROCEDURE — 83735 ASSAY OF MAGNESIUM: CPT

## 2017-11-26 PROCEDURE — 99285 EMERGENCY DEPT VISIT HI MDM: CPT

## 2017-11-26 RX ADMIN — Medication SCH ML: at 21:02

## 2017-11-26 RX ADMIN — IPRATROPIUM BROMIDE AND ALBUTEROL SULFATE SCH AMPULE: .5; 3 SOLUTION RESPIRATORY (INHALATION) at 20:00

## 2017-11-26 RX ADMIN — STANDARDIZED SENNA CONCENTRATE AND DOCUSATE SODIUM SCH TAB: 8.6; 5 TABLET, FILM COATED ORAL at 21:02

## 2017-11-26 NOTE — PD
HPI


Chief Complaint:  Cardiac Complaint


Time Seen by Provider:  13:27


Travel History


International Travel<30 days:  No


Contact w/Intl Traveler<30days:  No


Traveled to known affect area:  No





History of Present Illness


HPI


86 y/o female presents with chest pressure over the past day after she's been 

experiencing cold symptoms over the past week.  She states that Dr. Sanchez is 

her cardiologist and he catheter recently and found of valve issue and thinks 

the rest was okay.  She states she hasn't had an aspirin yet today because she 

has history of bleeding.  She states that she has no other concurrent 

complaints.  History is limited from patient and given she has difficulty 

remembering further details.





PFSH


Past Medical History


Hx Anticoagulant Therapy:  Yes


Anemia:  Yes


Arthritis:  Yes


Autoimmune Disease:  No


Anxiety:  Yes


Depression:  Yes


Heart Rhythm Problems:  No


Cancer:  No


Cardiovascular Problems:  Yes (HTN)


High Cholesterol:  Yes


Chest Pain:  No


Congestive Heart Failure:  Yes (THIS ADMIT)


Developmental Delay:  Yes


Diabetes:  No


Diminished Hearing:  No


Endocrine:  No


GERD:  Yes


Glaucoma:  No


Genitourinary:  No


Hepatitis:  No


Hiatal Hernia:  No


Hypertension:  Yes


Immune Disorder:  No


Implanted Vascular Access Dvce:  Yes


Kidney Stones:  No


Musculoskeletal:  Yes


Neurologic:  No


Psychiatric:  Yes


Reproductive:  No


Respiratory:  No


Immunizations Current:  Yes


Renal Failure:  No


Thyroid Disease:  No


Ulcer:  No


Menopausal:  Yes





Past Surgical History


Abdominal Surgery:  Yes (HYSTERECTOMY)


Body Medical Devices:  FRANKIE EYE LENS


Cardiac Surgery:  No


Ear Surgery:  No


Endocrine Surgery:  No


Eye Surgery:  Yes (BILATERAL CATARACT SX; LEFT RETINA SX x2)


Genitourinary Surgery:  No


Gynecologic Surgery:  Yes (HYSTERECTOMY in 1982)


Hysterectomy:  Yes


Joint Replacement:  Yes (Bilateral Knee)


Oral Surgery:  No


Thoracic Surgery:  No


Other Surgery:  Yes (BILAT. TOTAL KNEE REPLACEMENTS)





Social History


Alcohol Use:  Yes (occas)


Tobacco Use:  No (QUIT 30 YRS)


Substance Use:  No





Allergies-Medications


(Allergen,Severity, Reaction):  


Coded Allergies:  


     morphine (Unverified  Allergy, Intermediate, 11/26/17)


Reported Meds & Prescriptions





Reported Meds & Active Scripts


Active


Potassium Chloride Microencaps 20 Meq Tab 20 Meq PO DAILY


Furosemide 20 Mg Tab 20 Mg PO DAILY


Zoloft (Sertraline HCl) 50 Mg Tab 150 Mg PO DAILY


Reported


Vitamin B12-Folic Acid (Cobalamine Combinations) 500-400 Mcg Tab 1 Tab PO DAILY


Pantoprazole (Pantoprazole Sodium) 40 Mg Tab 40 Mg PO DAILY


Poly-Iron 150 (Polysaccharide Iron Complex) 150 Mg Iron Cap 150 Mg PO DAILY


Atorvastatin (Atorvastatin Calcium) 20 Mg Tab 20 Mg PO HS


Ocuvite (Multiple Vitamins W/ Minerals) 1 Tab 1 Tab PO DAILY


Cetirizine (Cetirizine HCl) 10 Mg Tab 10 Mg PO DAILY


Xanax (Alprazolam) 0.25 Mg Tab 0.25 Mg PO TID PRN








Review of Systems


Except as stated in HPI:  all other systems reviewed are Neg





Physical Exam


Narrative


GENERAL: Well-nourished, well-developed patient.


SKIN: Warm and dry.


HEAD: Normocephalic and atraumatic.


EYES: No injection or drainage. 


ENT: No nasal drainage noted. 


NECK: Supple, trachea midline.


CARDIOVASCULAR: Regular rate and rhythm 


RESPIRATORY: Breath sounds equal bilaterally at apices. No accessory muscle use.


GASTROINTESTINAL: Abdomen soft, non-tender, nondistended. 


EXTREMITIES: No edema.


NEUROLOGICAL: Awake. moves all extremities and sensory grossly within normal 

limits. Normal speech.





Data


Data


Last Documented VS





Vital Signs








  Date Time  Temp Pulse Resp B/P (MAP) Pulse Ox O2 Delivery O2 Flow Rate FiO2


 


11/26/17 13:35 98.3 85 16 128/63 (84) 100 Room Air  








Orders





 Orders


Electrocardiogram (11/26/17 )


Electrocardiogram (11/26/17 13:33)


B-Type Natriuretic Peptide (11/26/17 13:33)


Ckmb (Isoenzyme) Profile (11/26/17 13:33)


Complete Blood Count With Diff (11/26/17 13:33)


Comprehensive Metabolic Panel (11/26/17 13:33)


Magnesium (Mg) (11/26/17 13:33)


Prothrombin Time / Inr (Pt) (11/26/17 13:33)


Act Partial Throm Time (Ptt) (11/26/17 13:33)


Troponin I (11/26/17 13:33)


Lipase (11/26/17 13:33)


Chest, Single Ap (11/26/17 13:33)


Ecg Monitoring (11/26/17 13:33)


Bilateral Bp Monitoring (11/26/17 13:33)


Iv Access Insert/Monitor (11/26/17 13:33)


Oximetry (11/26/17 13:33)


Aspirin (Aspirin) (11/26/17 13:45)


Sodium Chloride 0.9% Flush (Ns Flush) (11/26/17 13:45)


Admit Order (Ed Use Only) (11/26/17 15:28)





Labs





Laboratory Tests








Test


  11/26/17


14:00


 


White Blood Count 6.4 TH/MM3 


 


Red Blood Count 3.16 MIL/MM3 


 


Hemoglobin 8.6 GM/DL 


 


Hematocrit 26.9 % 


 


Mean Corpuscular Volume 85.0 FL 


 


Mean Corpuscular Hemoglobin 27.2 PG 


 


Mean Corpuscular Hemoglobin


Concent 32.0 % 


 


 


Red Cell Distribution Width 20.3 % 


 


Platelet Count 379 TH/MM3 


 


Mean Platelet Volume 8.3 FL 


 


Neutrophils (%) (Auto) 70.0 % 


 


Lymphocytes (%) (Auto) 21.1 % 


 


Monocytes (%) (Auto) 6.8 % 


 


Eosinophils (%) (Auto) 1.5 % 


 


Basophils (%) (Auto) 0.6 % 


 


Neutrophils # (Auto) 4.5 TH/MM3 


 


Lymphocytes # (Auto) 1.3 TH/MM3 


 


Monocytes # (Auto) 0.4 TH/MM3 


 


Eosinophils # (Auto) 0.1 TH/MM3 


 


Basophils # (Auto) 0.0 TH/MM3 


 


CBC Comment DIFF FINAL 


 


Differential Comment  


 


Prothrombin Time 10.2 SEC 


 


Prothromb Time International


Ratio 0.9 RATIO 


 


 


Activated Partial


Thromboplast Time 22.2 SEC 


 


 


Blood Urea Nitrogen 19 MG/DL 


 


Creatinine 0.81 MG/DL 


 


Random Glucose 95 MG/DL 


 


Total Protein 6.9 GM/DL 


 


Albumin 3.4 GM/DL 


 


Calcium Level 8.7 MG/DL 


 


Magnesium Level 2.2 MG/DL 


 


Alkaline Phosphatase 76 U/L 


 


Aspartate Amino Transf


(AST/SGOT) 35 U/L 


 


 


Alanine Aminotransferase


(ALT/SGPT) 27 U/L 


 


 


Total Bilirubin 0.3 MG/DL 


 


Sodium Level 138 MEQ/L 


 


Potassium Level 4.6 MEQ/L 


 


Chloride Level 103 MEQ/L 


 


Carbon Dioxide Level 28.5 MEQ/L 


 


Anion Gap 7 MEQ/L 


 


Estimat Glomerular Filtration


Rate 67 ML/MIN 


 


 


Total Creatine Kinase 64 U/L 


 


Troponin I


  LESS THAN 0.02


NG/ML


 


B-Type Natriuretic Peptide 113 PG/ML 


 


Lipase 110 U/L 











MDM


Medical Decision Making


Medical Screen Exam Complete:  Yes


Emergency Medical Condition:  Yes


Medical Record Reviewed:  Yes ([facility] confirmed, recent complex hospitalization 

reviewed, catheter from 2017 noted)


Interpretation(s)


EKG is sinus rhythm at 80 without new ST segment elevation or depression when 

compared to prior


CBC & BMP Diagram


11/26/17 14:00








Total Protein 6.9, Albumin 3.4, Calcium Level 8.7, Magnesium Level 2.2, 

Alkaline Phosphatase 76, Aspartate Amino Transf (AST/SGOT) 35, Alanine 

Aminotransferase (ALT/SGPT) 27, Total Bilirubin 0.3





cxr small area ?atelectasis given no fever or URI symptoms


Differential Diagnosis


Pneumonia, pneumothorax, anemia, renal failure, cardiac


Narrative Course


Will check blood work, chest x-ray and dose with aspirin and reevaluate





ed workup with persistent anemia, first set of cardiac markers are negative, 

will discuss with her cardiologist





Will admit to the hospital





Physician Communication


Physician Communication


dr daysi ramirez to monitor lab work in the hospital


Dr. Rose agrees to admission





Diagnosis





 Primary Impression:  


 Chest pain


 Qualified Codes:  R07.9 - Chest pain, unspecified


 Additional Impressions:  


 Anemia


 Qualified Codes:  D64.9 - Anemia, unspecified


 Aortic valve stenosis, severe





Admitting Information


Admitting Physician Requests:  Demetrice Beckman MD Nov 26, 2017 13:42

## 2017-11-26 NOTE — HHI.HP
HPI


Service


[facility]


Primary Care Physician


Flower Gamez MD


Admission Diagnosis


chest pain, anemia


Chief Complaint:  


chest pain


Travel History


International Travel<30 Days:  No


Contact w/Intl Traveler <30 Da:  No


Traveled to Known Affected Are:  No


History of Present Illness


Mrs. Dillon is a pleasant 86 y/o WF with iron deficiency anemia/GIB/SB 

angiectasia, severe aortic stenosis, hx of CHF, HTN, and hyperlipidemia. She 

has had multiple recent admissions. Previous admission from - for SOB 

and felt to have acute CHF symptoms related to severe AVS. She had an 

echocardiogram (17) which noted severe aortic valve stenosis and she was 

initially decompensated. Pt was diuresed with improvement. She was evaluated 

with a  LHC and consideration for AVR and this was performed on  and showed 

critical AS but nonobstructive CAD. She was seen by CTS who did the initial 

evaluation for TAVR vs traditional AVR. It was decided to send the pt home on 

ACE/diuretic/ASA/Plavix and monitored for bleeding as she has a hx of previous 

GIB. Pt has had ongoing issues with anemia and possible GIB. She was previously 

admitted in 2017 and underwent Small bowel enteroscopy/incomplete 

colonoscopy (17) which noted thickened fold in the duodenum, otherwise 

normal enteroscopy and unfortunately the scope was unable to be passed beyond 

the sigmoid 25 cm secondary to either twisted colon, spasm or external 

compression, it was felt to not be safe to push the scope without seeing the 

lumen because of fear of perforation so endoscopy was terminated. As an outpt 

she had Capsule endoscopy (17) which noted multiple angiectasia with active 

bleeding and pt was sent for antegrade single balloon enteroscopy (17) but 

unfortunately there was no active bleeding found and the enteroscopy was found 

to be normal except for a hiatal hernia. Pt was readmitted from 17 to  with anemia. During that admission she was transfused with a total of 6 

units of PRBCs with improvement. Her ASA/Plavix were stopped at that time. She 

was seen by GI and Hematology during that admission and GI recommended double 

balloon enteroscopy to be performed at a tertiary center as an outpt. 

Hematology recommended transfusing iron sucrose which she received. Pt 





[place] [facility] [place] complaints of weakness and 

chest discomfort.  Per patient she woke up weak and some chest discomfort.  She 

follows with Dr. Newton and was seen in . Pt is reportedly supposed to have 

an iron replacement therapy tomorrow. She denies any obvious GIB. Her H/H at 

admission was 7.4/23.7. Prior to that her labs on  were Hgb 8.6/Hct 26.6. 

CXR in the ED with no acute disease. At the time of our evaluation in the ED pt 

found to be in respiratory distress. She had been given one unit of PRBCs and 

appears to be in some degree of volume overload. Pt to be given IV Lasix and a 

breathing treatment STAT and we will repeat CXR.





Patient presents to the ER today 17 with complaints of chest pain.  Chest 

pressure/heaviness located across anterior chest.  Patient endorses shortness 

of breath with exertion.  Patient also has had nonproductive cough mostly in 

the morning for the past 4-5 days associated with wheezing.  Patient reports 

feeling, "woozy," with standing.  Patient denies bright red blood per rectum, 

black tarry stools or vomiting.    











Review of Systems


Constitutional:  DENIES: Fatigue, Fever, Chills


Respiratory:  COMPLAINS OF: Cough, Shortness of breath, DENIES: Sputum 

production


Cardiovascular:  COMPLAINS OF: Chest pain, Dyspnea on Exertion, DENIES: 

Palpitations, Lower Extremity Edema


Gastrointestinal:  DENIES: Abdominal pain, Black stools, Bloody stools, BRB per 

rectum, Nausea, Vomiting


Neurologic:  DENIES: Abnormal gait, Headache, Localized weakness


Psychiatric:  DENIES: Anxiety, Confusion, Depression





Past Family Social History


Past Medical History


Iron deficiency anemia/GIB/SB angiectasia


Severe aortic stenosis, LHC was performed on  and showed critical AS but 

nonobstructive CAD. She was seen by CTS who did the initial evaluation for TAVR 

vs traditional AVR.


Valdez's esophagus


CHF


Diverticulosis


HTN


Hyperlipidemia


Lumbar stenosis


Macular degeneration


Depression/Anxiety





2D echocardiogram (17) which noted severe aortic valve stenosis


Past Surgical History


Antegrade single balloon enteroscopy (17) --> Normal enteroscopy except for 

a hiatal hernia


Capsule endoscopy (17) --> Multiple angiectasia with active bleeding


Small bowel enteroscopy (17) --> thickened fold in the duodenum Bx done 

otherwise normal


Colonoscopy (17) --> Scope was unable to be passed beyond the sigmoid 25 cm 

secondary to either twisted colon, spasm or external compression, it was felt 

to not


 be safe to push the scope without seeing the lumen because of fear of 

perforation so endoscopy was terminated


Colonoscopy (16) --> Severe diverticulosis in the left colon, colitis, 

hemorrhoids, small polyp


EGD/colonoscopy (16) --> gastritis, HH, esophagitis, diverticulosis, 

hemorrhoids, polyps, incomplete colonoscopy


Colonoscopy (2/10/16) --> Very tortuous, edematous sigmoid colon, possible 

diverticular colitis, diminutive polyps in the ascending colon, sigmoid 

diverticulosis, grade I hemorrhoids,     decreased sphincter tone. 


Cataract surgery


Knee arthroscopy/arthroplasty


VICKEY





Reported Medications


Potassium Chloride Microencaps 20 Meq Tab 20 Meq PO DAILY


Furosemide 20 Mg Tab 20 Mg PO DAILY


Zoloft (Sertraline HCl) 50 Mg Tab 150 Mg PO DAILY


Vitamin B12-Folic Acid (Cobalamine Combinations) 500-400 Mcg Tab 1 Tab PO DAILY


Pantoprazole (Pantoprazole Sodium) 40 Mg Tab 40 Mg PO DAILY


Poly-Iron 150 (Polysaccharide Iron Complex) 150 Mg Iron Cap 150 Mg PO DAILY


Atorvastatin (Atorvastatin Calcium) 20 Mg Tab 20 Mg PO HS


Ocuvite (Multiple Vitamins W/ Minerals) 1 Tab 1 Tab PO DAILY


Cetirizine (Cetirizine HCl) 10 Mg Tab 10 Mg PO DAILY


Xanax (Alprazolam) 0.25 Mg Tab 0.25 Mg PO TID PRN


Allergies:  


Coded Allergies:  


     morphine (Unverified  Allergy, Intermediate, 17)


Active Ordered Medications





Current Medications








 Medications


  (Trade)  Dose


 Ordered  Sig/Brigitte


 Route  Start Time


 Stop Time Status Last Admin


 


  (NS Flush)  2 ml  UNSCH  PRN


 IVF  17 13:45


     


 








Family History


Noncontributory


Social History


Denies any tobacco use


Occasionally drinks beer or vodka


Pt lives alone, she is a 


Her children live out of town


She has a family friend who looks after her here locally





Physical Exam


Vital Signs





Vital Signs








  Date Time  Temp Pulse Resp B/P (MAP) Pulse Ox O2 Delivery O2 Flow Rate FiO2


 


17 13:35 98.3 85 16 128/63 (84) 100 Room Air  


 


17 13:35  82 16  97 Room Air  


 


17 13:35    125/61 (82)    





    128/63 (84)    


 


17 13:35   16  97 Room Air  


 


17 13:29     97   








Physical Exam


GENERAL: This is a well-nourished, well-developed patient, in no apparent 

distress.


SKIN: No rashes, ecchymoses or lesions. Cool and dry.


HEAD: Atraumatic. Normocephalic. No temporal or scalp tenderness.


EYES: Extraocular motions intact. No scleral icterus. No injection or drainage. 


ENT: Nose without bleeding, purulent drainage or septal hematoma. Throat 

without erythema, tonsillar hypertrophy or exudate. Uvula midline. Airway 

patent.


NECK: Trachea midline. No JVD or lymphadenopathy. Supple, nontender, no 

meningeal signs.


CARDIOVASCULAR: Regular rate and rhythm murmur present


RESPIRATORY: Clear to auscultation. Breath sounds equal bilaterally. 


GASTROINTESTINAL: Abdomen soft, non-tender, nondistended. 


MUSCULOSKELETAL: Extremities without clubbing, cyanosis, or edema. No joint 

tenderness, effusion, or edema noted. No calf tenderness. Negative Homans sign 

bilaterally.


NEUROLOGICAL: Awake and alert. No focal deficits noted.  Motor and sensory 

grossly within normal limits. Five out of 5 muscle strength in all muscle 

groups.  Normal speech.


Laboratory





Laboratory Tests








Test


  17


14:00


 


White Blood Count 6.4 


 


Red Blood Count 3.16 


 


Hemoglobin 8.6 


 


Hematocrit 26.9 


 


Mean Corpuscular Volume 85.0 


 


Mean Corpuscular Hemoglobin 27.2 


 


Mean Corpuscular Hemoglobin


Concent 32.0 


 


 


Red Cell Distribution Width 20.3 


 


Platelet Count 379 


 


Mean Platelet Volume 8.3 


 


Neutrophils (%) (Auto) 70.0 


 


Lymphocytes (%) (Auto) 21.1 


 


Monocytes (%) (Auto) 6.8 


 


Eosinophils (%) (Auto) 1.5 


 


Basophils (%) (Auto) 0.6 


 


Neutrophils # (Auto) 4.5 


 


Lymphocytes # (Auto) 1.3 


 


Monocytes # (Auto) 0.4 


 


Eosinophils # (Auto) 0.1 


 


Basophils # (Auto) 0.0 


 


CBC Comment DIFF FINAL 


 


Differential Comment  


 


Prothrombin Time 10.2 


 


Prothromb Time International


Ratio 0.9 


 


 


Activated Partial


Thromboplast Time 22.2 


 


 


Blood Urea Nitrogen 19 


 


Creatinine 0.81 


 


Random Glucose 95 


 


Total Protein 6.9 


 


Albumin 3.4 


 


Calcium Level 8.7 


 


Magnesium Level 2.2 


 


Alkaline Phosphatase 76 


 


Aspartate Amino Transf


(AST/SGOT) 35 


 


 


Alanine Aminotransferase


(ALT/SGPT) 27 


 


 


Total Bilirubin 0.3 


 


Sodium Level 138 


 


Potassium Level 4.6 


 


Chloride Level 103 


 


Carbon Dioxide Level 28.5 


 


Anion Gap 7 


 


Estimat Glomerular Filtration


Rate 67 


 


 


Total Creatine Kinase 64 


 


Troponin I LESS THAN 0.02 


 


B-Type Natriuretic Peptide 113 


 


Lipase 110 








Result Diagram:  


17 1400                                                                  

              17 1400








Caprini VTE Risk Assessment


Caprini VTE Risk Assessment:  Mod/High Risk (score >= 2)


Caprini Risk Assessment Model











 Point Value = 1          Point Value = 2  Point Value = 3  Point Value = 5


 


Age 41-60


Minor surgery


BMI > 25 kg/m2


Swollen legs


Varicose veins


Pregnancy or postpartum


History of unexplained or recurrent


   spontaneous 


Oral contraceptives or hormone


   replacement


Sepsis (< 1 month)


Serious lung disease, including


   pneumonia (< 1 month)


Abnormal pulmonary function


Acute myocardial infarction


Congestive heart failure (< 1 month)


History of inflammatory bowel disease


Medical patient at bed rest Age 61-74


Arthroscopic surgery


Major open surgery (> 45 min)


Laparoscopic surgery (> 45 min)


Malignancy


Confined to bed (> 72 hours)


Immobilizing plaster cast


Central venous access Age >= 75


History of VTE


Family history of VTE


Factor V Leiden


Prothrombin 60320Y


Lupus anticoagulant


Anticardiolipin antibodies


Elevated serum homocysteine


Heparin-induced thrombocytopenia


Other congenital or acquired


   thrombophilia Stroke (< 1 month)


Elective arthroplasty


Hip, pelvis, or leg fracture


Acute spinal cord injury (< 1 month)








Prophylaxis Regimen











   Total Risk


Factor Score Risk Level Prophylaxis Regimen


 


0-1      Low Early ambulation


 


2 Moderate Order ONE of the following:


*Sequential Compression Device (SCD)


*Heparin 5000 units SQ BID


 


3-4 Higher Order ONE of the following medications:


*Heparin 5000 units SQ TID


*Enoxaparin/Lovenox 40 mg SQ daily (WT < 150 kg, CrCl > 30 mL/min)


*Enoxaparin/Lovenox 30 mg SQ daily (WT < 150 kg, CrCl > 10-29 mL/min)


*Enoxaparin/Lovenox 30 mg SQ BID (WT < 150 kg, CrCl > 30 mL/min)


AND/OR


*Sequential Compression Device (SCD)


 


5 or more Highest Order ONE of the following medications:


*Heparin 5000 units SQ TID (Preferred with Epidurals)


*Enoxaparin/Lovenox 40 mg SQ daily (WT < 150 kg, CrCl > 30 mL/min)


*Enoxaparin/Lovenox 30 mg SQ daily (WT < 150 kg, CrCl > 10-29 mL/min)


*Enoxaparin/Lovenox 30 mg SQ BID (WT < 150 kg, CrCl > 30 mL/min)


AND


*Sequential Compression Device (SCD)











Assessment and Plan


Problem List:  


(1) Chest pain


ICD Codes:  R07.9 - Chest pain, unspecified


Status:  Acute


Plan:  Chest pain- unlikely ACS


initial troponin <0.02, will trend serial troponin and serial EKG


continuous telemetry


Recent cardiac catheterization 17 showed: 


1. Left main coronary is angiographically normal.


2. Left anterior descending coronary has mild luminal irregularities throughout


     its proximal course. Diagonal branch has minor luminal irregularities.


3. Left circumflex is tortuous, has a 30% ostial stenosis and otherwise minor


     luminal irregularities.


4. Right coronary is a dominant vessel giving rise to posterior descending


     branch.  Right coronary has minor luminal irregularities.


hgb 8.4 today this maybe contributing to patient's chest pressure, SOB with 

exertion and lightheaded feeling when standing


transfuse 1 unit PRBC- slowly as patient had volume overload on last admission 

after PRBC transfusion


followed by Lasix IV


recheck H&H in AM





Anemia


recurrent GI bleed


- Pt is an 86 y/o female with iron deficiency anemia/GIB/SB angiectasia, severe 

aortic stenosis, hx of CHF, HTN, and hyperlipidemia. 


- She has had multiple recent admissions.  Pt has had ongoing issues with 

anemia and possible GIB. She was previously admitted in 


 2017 and underwent Small bowel enteroscopy/incomplete colonoscopy (17

) which noted thickened fold in the duodenum, 


 otherwise normal enteroscopy and unfortunately the scope was unable to be 

passed beyond the sigmoid 25 cm secondary to either 


 twisted colon, spasm or external compression, it was felt to not be safe to 

push the scope without seeing the lumen because of fear 


 of perforation so endoscopy was terminated. As an outpt she had Capsule 

endoscopy (17) which noted multiple angiectasia with 


 active bleeding and pt was sent for antegrade single balloon enteroscopy () but unfortunately there was no active bleeding found 


 and the enteroscopy was found to be normal except for a hiatal hernia. Pt was 

readmitted from 17 to 17 with anemia. During 


 that admission she was transfused with a total of 6 units of PRBCs with 

improvement. Her ASA/Plavix (which were started during last 


 admission for trial before TAVR) were stopped. She was seen by GI and 

Hematology during that admission and GI recommended double


 balloon enteroscopy to be performed at a tertiary center as an outpt. 

Hematology recommended transfusing iron sucrose which she received. 





- She denies any obvious GIB. 


- Per pt she now follows with Hematology, Dr. Newton. 


- Case d/w Dr. Newton ().  Pt to receive weekly iron infusions x 3 weeks.  


- first infusion of IV iron was given in the hospital ()


- Recheck CBC in AM 


- Pt has an appt at [facility] 17 for Double balloon enteroscopy. 





CHF (congestive heart failure)


- see above





Aortic valve stenosis, severe


- Previous admission from - for SOB and felt to have acute CHF symptoms 

related to severe AVS. 


- 2D echocardiogram (17) which noted severe aortic valve stenosis and she 

was initially decompensated. 


- She was seen by CTS who did the initial evaluation for TAVR vs traditional 

AVR but due to her issues with GIB


 this has been put on hold.


- She is no longer on any anticoagulants





Hypertension


- Cont. home meds


- Monitor





Hyperlipidemia


- Cont. home meds





- Anticipate d/c to home tomorrow, if stable so patient will be able to keep 

appointment with [facility] 17 





Per patient request Dr. Rose discussed the case with patient's friend 

and patient's daughter over the phone





DVT prophylaxis with SCDs avoid chemical DVT prophylaxis due to recurrent GI 

bleeding 





(2) Anemia


ICD Codes:  D64.9 - Anemia, unspecified


Status:  Acute


(3) GI bleed


ICD Codes:  K92.2 - Gastrointestinal hemorrhage, unspecified


Status:  Resolved


(4) CHF (congestive heart failure)


ICD Codes:  I50.9 - Heart failure, unspecified


Status:  Acute


(5) Hypertension


ICD Codes:  I10 - Essential (primary) hypertension


Status:  Chronic


(6) Hyperlipidemia


ICD Codes:  E78.5 - Hyperlipidemia, unspecified


Status:  Chronic


Assessment and Plan


Patient examined.


Assessment and plan formulated with Lyly Chacon PA-C.


I agree with the above.


pt with severe AVS and recurrent gib and symptomatic anemia.


extensive gi w/up and had previously noted small bowel avm's that


were oozing on capsule.


Pt developed a cold and mild cough. She describes sob/chest pressure


along with some lightheaded/dizzy type symptoms. Pt is very sure


these are the same sx's she gets with worsening anemia. Her hgb has


dropped slightly since last admission. she has a [facility] appt with GI


this week on Wednesday. Plan was for possible double balloon enteroscopy.


source of bleeding needs to be identified before proceeding with TAVR,


I spoke to her daughter and son in law by phone. They have plans of flying


from [place] to [place] on Tuesday to assist our pt to the [facility].....We will fax our


records to [facility] tomorrow. The plan would be for d/c tomorrow if pt stable.





Problem Qualifiers





(1) Chest pain:  


Qualified Codes:  R07.9 - Chest pain, unspecified


(2) Anemia:  


Qualified Codes:  D64.9 - Anemia, unspecified








Lyly Chacon 2017 15:31


Jaxson Rose MD 2017 16:47

## 2017-11-26 NOTE — RADRPT
EXAM DATE/TIME:  11/26/2017 13:44 

 

[facility] COMPARISON:     

CHEST SINGLE AP, November 07, 2017, 18:02.  CHEST SINGLE AP, November 08, 2017, 5:11.

 

                     

INDICATIONS :     

Chest pain

                     

 

MEDICAL HISTORY :            

Hypertension. Congestive heart failure. Gastroesophageal reflux disease   

 

SURGICAL HISTORY :     

None.   

 

ENCOUNTER:     

Initial                                        

 

ACUITY:     

1 day      

 

PAIN SCORE:     

3/10

 

LOCATION:      

chest 

 

FINDINGS:     

There is some oblique linear opacities in the lower lateral left lung with l; this is a new finding w
hen compared to prior chest x-rays.oss of delineation the left hemidiaphragm.  The right lung is tato
r the heart is normal in size.

 

CONCLUSION:     

Oblique linear atelectasis, infiltrate, or scarring in the lower lateral left lung.

 

 

 

 Theodore Soria MD on November 26, 2017 at 14:48           

Board Certified Radiologist.

 This report was verified electronically.

## 2017-11-26 NOTE — EKG
Date Performed: 11/26/2017       Time Performed: 13:31:26

 

PTAGE:      85 years

 

EKG:      Sinus rhythm 

 

 ST/T-WAVE ABNORMALITY, CONSIDER LATERAL ISCHEMIA ABNORMAL ECG

 

PREVIOUS TRACING       : 11/07/2017 16.18 No significant change from previous tracing noted.

 

DOCTOR:   Anthony Lui  Interpretating Date/Time  11/26/2017 21:07:38

## 2017-11-27 VITALS — DIASTOLIC BLOOD PRESSURE: 62 MMHG | SYSTOLIC BLOOD PRESSURE: 133 MMHG | HEART RATE: 88 BPM

## 2017-11-27 VITALS
DIASTOLIC BLOOD PRESSURE: 60 MMHG | OXYGEN SATURATION: 96 % | TEMPERATURE: 98.3 F | SYSTOLIC BLOOD PRESSURE: 128 MMHG | RESPIRATION RATE: 18 BRPM | HEART RATE: 79 BPM

## 2017-11-27 VITALS
HEART RATE: 75 BPM | TEMPERATURE: 98.8 F | RESPIRATION RATE: 18 BRPM | SYSTOLIC BLOOD PRESSURE: 132 MMHG | OXYGEN SATURATION: 97 % | DIASTOLIC BLOOD PRESSURE: 61 MMHG

## 2017-11-27 VITALS
SYSTOLIC BLOOD PRESSURE: 135 MMHG | TEMPERATURE: 98.7 F | DIASTOLIC BLOOD PRESSURE: 73 MMHG | OXYGEN SATURATION: 94 % | HEART RATE: 87 BPM | RESPIRATION RATE: 18 BRPM

## 2017-11-27 VITALS — HEART RATE: 84 BPM

## 2017-11-27 VITALS
HEART RATE: 82 BPM | DIASTOLIC BLOOD PRESSURE: 62 MMHG | RESPIRATION RATE: 15 BRPM | OXYGEN SATURATION: 97 % | TEMPERATURE: 96.5 F | SYSTOLIC BLOOD PRESSURE: 134 MMHG

## 2017-11-27 VITALS
SYSTOLIC BLOOD PRESSURE: 120 MMHG | RESPIRATION RATE: 18 BRPM | HEART RATE: 86 BPM | TEMPERATURE: 96.8 F | OXYGEN SATURATION: 94 % | DIASTOLIC BLOOD PRESSURE: 58 MMHG

## 2017-11-27 VITALS — HEART RATE: 85 BPM

## 2017-11-27 VITALS — OXYGEN SATURATION: 93 %

## 2017-11-27 LAB
ANION GAP SERPL CALC-SCNC: 7 MEQ/L (ref 5–15)
BASOPHILS # BLD AUTO: 0 TH/MM3 (ref 0–0.2)
BASOPHILS NFR BLD: 0.7 % (ref 0–2)
BUN SERPL-MCNC: 29 MG/DL (ref 7–18)
CHLORIDE SERPL-SCNC: 102 MEQ/L (ref 98–107)
EOSINOPHIL # BLD: 0.1 TH/MM3 (ref 0–0.4)
EOSINOPHIL NFR BLD: 2.4 % (ref 0–4)
ERYTHROCYTE [DISTWIDTH] IN BLOOD BY AUTOMATED COUNT: 19.6 % (ref 11.6–17.2)
GFR SERPLBLD BASED ON 1.73 SQ M-ARVRAT: 64 ML/MIN (ref 89–?)
HCO3 BLD-SCNC: 29.9 MEQ/L (ref 21–32)
HCT VFR BLD CALC: 26.7 % (ref 35–46)
HEMO FLAGS: (no result)
LYMPHOCYTES # BLD AUTO: 1.7 TH/MM3 (ref 1–4.8)
LYMPHOCYTES NFR BLD AUTO: 28.4 % (ref 9–44)
MCH RBC QN AUTO: 27.2 PG (ref 27–34)
MCHC RBC AUTO-ENTMCNC: 32.3 % (ref 32–36)
MCV RBC AUTO: 84.2 FL (ref 80–100)
MONOCYTES NFR BLD: 10.4 % (ref 0–8)
NEUTROPHILS # BLD AUTO: 3.6 TH/MM3 (ref 1.8–7.7)
NEUTROPHILS NFR BLD AUTO: 58.1 % (ref 16–70)
PLATELET # BLD: 314 TH/MM3 (ref 150–450)
POTASSIUM SERPL-SCNC: 3.4 MEQ/L (ref 3.5–5.1)
RBC # BLD AUTO: 3.17 MIL/MM3 (ref 4–5.3)
SODIUM SERPL-SCNC: 139 MEQ/L (ref 136–145)
WBC # BLD AUTO: 6.1 TH/MM3 (ref 4–11)

## 2017-11-27 RX ADMIN — IPRATROPIUM BROMIDE AND ALBUTEROL SULFATE SCH AMPULE: .5; 3 SOLUTION RESPIRATORY (INHALATION) at 13:26

## 2017-11-27 RX ADMIN — Medication SCH ML: at 07:57

## 2017-11-27 RX ADMIN — STANDARDIZED SENNA CONCENTRATE AND DOCUSATE SODIUM SCH TAB: 8.6; 5 TABLET, FILM COATED ORAL at 07:57

## 2017-11-27 RX ADMIN — IPRATROPIUM BROMIDE AND ALBUTEROL SULFATE SCH AMPULE: .5; 3 SOLUTION RESPIRATORY (INHALATION) at 08:10

## 2017-11-27 NOTE — HHI.PR
Subjective


Remarks


Patient reports chest pain and lightheaded feeling after standing has resolved


patient now reports extreme fatigue


Offers no other complaints at this time


denies s/s of active GI bleeding





Objective


Vitals





Vital Signs








  Date Time  Temp Pulse Resp B/P (MAP) Pulse Ox O2 Delivery O2 Flow Rate FiO2


 


11/27/17 07:51 96.8 86 18 120/58 (78) 94   


 


11/27/17 07:17  84      


 


11/27/17 05:32 98.7 87 18 135/73 (93) 94   


 


11/26/17 23:59  88      


 


11/26/17 23:18 98.2 78 18 113/74 (87) 97   


 


11/26/17 21:00  86      


 


11/26/17 20:15     97   


 


11/26/17 20:07 98.4 68 18 129/69 (89) 98   


 


11/26/17 19:07  83 18 102/52 93   


 


11/26/17 18:41 98.3 85 16 107/66 95   


 


11/26/17 17:20        


 


11/26/17 17:07  90 16 130/57 (81) 99 Room Air  


 


11/26/17 13:35 98.3 85 16 128/63 (84) 100 Room Air  


 


11/26/17 13:35  82 16  97 Room Air  


 


11/26/17 13:35    125/61 (82)    





    128/63 (84)    


 


11/26/17 13:35   16  97 Room Air  


 


11/26/17 13:29     97   








Result Diagram:  


11/27/17 0303                                                                  

              11/27/17 0303





Other Results





 Laboratory Tests








Test


  11/26/17


14:00 11/26/17


20:00 11/27/17


03:03


 


White Blood Count 6.4 TH/MM3   6.1 TH/MM3 


 


Red Blood Count 3.16 MIL/MM3   3.17 MIL/MM3 


 


Hemoglobin 8.6 GM/DL   8.6 GM/DL 


 


Hematocrit 26.9 %   26.7 % 


 


Mean Corpuscular Volume 85.0 FL   84.2 FL 


 


Mean Corpuscular Hemoglobin 27.2 PG   27.2 PG 


 


Mean Corpuscular Hemoglobin


Concent 32.0 % 


  


  32.3 % 


 


 


Red Cell Distribution Width 20.3 %   19.6 % 


 


Platelet Count 379 TH/MM3   314 TH/MM3 


 


Mean Platelet Volume 8.3 FL   7.9 FL 


 


Neutrophils (%) (Auto) 70.0 %   58.1 % 


 


Lymphocytes (%) (Auto) 21.1 %   28.4 % 


 


Monocytes (%) (Auto) 6.8 %   10.4 % 


 


Eosinophils (%) (Auto) 1.5 %   2.4 % 


 


Basophils (%) (Auto) 0.6 %   0.7 % 


 


Neutrophils # (Auto) 4.5 TH/MM3   3.6 TH/MM3 


 


Lymphocytes # (Auto) 1.3 TH/MM3   1.7 TH/MM3 


 


Monocytes # (Auto) 0.4 TH/MM3   0.6 TH/MM3 


 


Eosinophils # (Auto) 0.1 TH/MM3   0.1 TH/MM3 


 


Basophils # (Auto) 0.0 TH/MM3   0.0 TH/MM3 


 


CBC Comment DIFF FINAL   DIFF FINAL 


 


Differential Comment     


 


Prothrombin Time 10.2 SEC   


 


Prothromb Time International


Ratio 0.9 RATIO 


  


  


 


 


Activated Partial


Thromboplast Time 22.2 SEC 


  


  


 


 


Blood Urea Nitrogen 19 MG/DL   29 MG/DL 


 


Creatinine 0.81 MG/DL   0.84 MG/DL 


 


Random Glucose 95 MG/DL   91 MG/DL 


 


Total Protein 6.9 GM/DL   


 


Albumin 3.4 GM/DL   


 


Calcium Level 8.7 MG/DL   8.2 MG/DL 


 


Magnesium Level 2.2 MG/DL   


 


Alkaline Phosphatase 76 U/L   


 


Aspartate Amino Transf


(AST/SGOT) 35 U/L 


  


  


 


 


Alanine Aminotransferase


(ALT/SGPT) 27 U/L 


  


  


 


 


Total Bilirubin 0.3 MG/DL   


 


Sodium Level 138 MEQ/L   139 MEQ/L 


 


Potassium Level 4.6 MEQ/L   3.4 MEQ/L 


 


Chloride Level 103 MEQ/L   102 MEQ/L 


 


Carbon Dioxide Level 28.5 MEQ/L   29.9 MEQ/L 


 


Anion Gap 7 MEQ/L   7 MEQ/L 


 


Estimat Glomerular Filtration


Rate 67 ML/MIN 


  


  64 ML/MIN 


 


 


Total Creatine Kinase 64 U/L   


 


Troponin I


  LESS THAN 0.02


NG/ML LESS THAN 0.02


NG/ML LESS THAN 0.02


NG/ML


 


B-Type Natriuretic Peptide 113 PG/ML   


 


Lipase 110 U/L   








Imaging





Last Impressions








Chest X-Ray 11/26/17 1333 Signed





Impressions: 





 Service Date/Time:  Sunday, November 26, 2017 13:44 - CONCLUSION:  Oblique 





 linear atelectasis, infiltrate, or scarring in the lower lateral left lung.   

  





 Theodore Soria MD 








Objective Remarks


GENERAL: This is a well-nourished, well-developed patient, in no apparent 

distress.


SKIN: No rashes, ecchymoses or lesions. Cool and dry. appears pale


CARDIOVASCULAR: Regular rate and rhythm murmur present


RESPIRATORY: Clear to auscultation. Breath sounds equal bilaterally. 


GASTROINTESTINAL: Abdomen soft, non-tender, nondistended. 


MUSCULOSKELETAL: Extremities without clubbing, cyanosis, or edema. No joint 

tenderness, effusion, or edema noted. No calf tenderness. Negative Homans sign 

bilaterally.


NEUROLOGICAL: Awake and alert. No focal deficits noted.  Motor and sensory 

grossly within normal limits. Five out of 5 muscle strength in all muscle 

groups.  Normal speech.





A/P


Problem List:  


(1) Chest pain


ICD Codes:  R07.9 - Chest pain, unspecified


Status:  Acute


Plan:  Chest pain- unlikely ACS


initial troponin <0.02, will trend serial troponin and serial EKG


continuous telemetry


Recent cardiac catheterization 8/21/17 showed: 


1. Left main coronary is angiographically normal.


2. Left anterior descending coronary has mild luminal irregularities throughout


     its proximal course. Diagonal branch has minor luminal irregularities.


3. Left circumflex is tortuous, has a 30% ostial stenosis and otherwise minor


     luminal irregularities.


4. Right coronary is a dominant vessel giving rise to posterior descending


     branch.  Right coronary has minor luminal irregularities.


hgb 8.4 today this maybe contributing to patient's chest pressure, SOB with 

exertion and lightheaded feeling when standing


transfuse 1 unit PRBC- slowly as patient had volume overload on last admission 

after PRBC transfusion


followed by Lasix IV


recheck H&H hemoglobin 8.6 on admission -> 8.6 (11/27) patient reports extreme 

fatigue will transfuse addition 1 unit PRBC with Lasix 20 mg IV after unit


Plan to DC after blood given





Anemia


recurrent GI bleed


- Pt is an 86 y/o female with iron deficiency anemia/GIB/SB angiectasia, severe 

aortic stenosis, hx of CHF, HTN, and hyperlipidemia. 


- She has had multiple recent admissions.  Pt has had ongoing issues with 

anemia and possible GIB. She was previously admitted in 


 June 2017 and underwent Small bowel enteroscopy/incomplete colonoscopy (6/1/17

) which noted thickened fold in the duodenum, 


 otherwise normal enteroscopy and unfortunately the scope was unable to be 

passed beyond the sigmoid 25 cm secondary to either 


 twisted colon, spasm or external compression, it was felt to not be safe to 

push the scope without seeing the lumen because of fear 


 of perforation so endoscopy was terminated. As an outpt she had Capsule 

endoscopy (8/1/17) which noted multiple angiectasia with 


 active bleeding and pt was sent for antegrade single balloon enteroscopy (8/7/ 17) but unfortunately there was no active bleeding found 


 and the enteroscopy was found to be normal except for a hiatal hernia. Pt was 

readmitted from 8/28/17 to 9/2/17 with anemia. During 


 that admission she was transfused with a total of 6 units of PRBCs with 

improvement. Her ASA/Plavix (which were started during last 


 admission for trial before TAVR) were stopped. She was seen by GI and 

Hematology during that admission and GI recommended double


 balloon enteroscopy to be performed at a tertiary center as an outpt. 

Hematology recommended transfusing iron sucrose which she received. 





- She denies any obvious GIB. 


- Per pt she now follows with Hematology, Dr. Newton. 


- Case d/w Dr. Newton (11/8).  Pt to receive weekly iron infusions x 3 weeks.  


- first infusion of IV iron was given in the hospital (11/8)


- Pt has an appt at [facility] 11/29/17 in preparation for Double balloon 

enteroscopy. 





CHF (congestive heart failure)


- see above





Aortic valve stenosis, severe


- Previous admission from 8/18-8/21 for SOB and felt to have acute CHF symptoms 

related to severe AVS. 


- 2D echocardiogram (8/19/17) which noted severe aortic valve stenosis and she 

was initially decompensated. 


- She was seen by CTS who did the initial evaluation for TAVR vs traditional 

AVR but due to her issues with GIB


 this has been put on hold.


- She is no longer on any anticoagulants





Hypertension


- Cont. home meds


- Monitor





Hyperlipidemia


- Cont. home meds





- Anticipate d/c to home after additional unit of blood, if stable so patient 

will be able to keep appointment with [facility] 11/29/17 





DVT prophylaxis with SCDs avoid chemical DVT prophylaxis due to recurrent GI 

bleeding 





(2) Anemia


ICD Codes:  D64.9 - Anemia, unspecified


Status:  Acute


(3) GI bleed


ICD Codes:  K92.2 - Gastrointestinal hemorrhage, unspecified


Status:  Resolved


(4) CHF (congestive heart failure)


ICD Codes:  I50.9 - Heart failure, unspecified


Status:  Acute


(5) Hypertension


ICD Codes:  I10 - Essential (primary) hypertension


Status:  Chronic


(6) Hyperlipidemia


ICD Codes:  E78.5 - Hyperlipidemia, unspecified


Status:  Chronic


Assessment and Plan


Patient examined.


Assessment and plan formulated with Lyly Chacon PA-C.


I agree with the above.


feels overall better with blood transfusion.


d/c later today after complete


has [facility] appt Wed. to arrange further gi w/up and identify source of


bleeding..prior to TAVR for severe AVS





Problem Qualifiers





(1) Chest pain:  


Qualified Codes:  R07.9 - Chest pain, unspecified


(2) Anemia:  


Qualified Codes:  D64.9 - Anemia, unspecified








Lyly Chacon Nov 27, 2017 08:36


Jaxson Rose MD Nov 27, 2017 12:44

## 2018-01-20 ENCOUNTER — HOSPITAL ENCOUNTER (INPATIENT)
Dept: HOSPITAL 17 - NEPE | Age: 83
LOS: 7 days | DRG: 372 | End: 2018-01-27
Payer: MEDICARE

## 2018-01-20 VITALS
HEART RATE: 106 BPM | DIASTOLIC BLOOD PRESSURE: 79 MMHG | OXYGEN SATURATION: 99 % | RESPIRATION RATE: 18 BRPM | SYSTOLIC BLOOD PRESSURE: 154 MMHG | TEMPERATURE: 96.9 F

## 2018-01-20 VITALS
OXYGEN SATURATION: 98 % | DIASTOLIC BLOOD PRESSURE: 74 MMHG | HEART RATE: 99 BPM | TEMPERATURE: 97.7 F | SYSTOLIC BLOOD PRESSURE: 144 MMHG | RESPIRATION RATE: 18 BRPM

## 2018-01-20 VITALS — OXYGEN SATURATION: 98 %

## 2018-01-20 VITALS — HEIGHT: 64 IN | WEIGHT: 171.96 LBS | BODY MASS INDEX: 29.36 KG/M2

## 2018-01-20 VITALS — HEART RATE: 107 BPM

## 2018-01-20 DIAGNOSIS — I50.9: ICD-10-CM

## 2018-01-20 DIAGNOSIS — E86.0: ICD-10-CM

## 2018-01-20 DIAGNOSIS — M19.90: ICD-10-CM

## 2018-01-20 DIAGNOSIS — R06.2: ICD-10-CM

## 2018-01-20 DIAGNOSIS — A04.72: Primary | ICD-10-CM

## 2018-01-20 DIAGNOSIS — R18.8: ICD-10-CM

## 2018-01-20 DIAGNOSIS — R00.0: ICD-10-CM

## 2018-01-20 DIAGNOSIS — I11.0: ICD-10-CM

## 2018-01-20 DIAGNOSIS — E87.6: ICD-10-CM

## 2018-01-20 DIAGNOSIS — Z96.653: ICD-10-CM

## 2018-01-20 DIAGNOSIS — J98.11: ICD-10-CM

## 2018-01-20 DIAGNOSIS — M48.061: ICD-10-CM

## 2018-01-20 DIAGNOSIS — K22.70: ICD-10-CM

## 2018-01-20 DIAGNOSIS — N28.1: ICD-10-CM

## 2018-01-20 DIAGNOSIS — D50.9: ICD-10-CM

## 2018-01-20 DIAGNOSIS — Z66: ICD-10-CM

## 2018-01-20 DIAGNOSIS — K44.9: ICD-10-CM

## 2018-01-20 DIAGNOSIS — R06.03: ICD-10-CM

## 2018-01-20 DIAGNOSIS — K29.70: ICD-10-CM

## 2018-01-20 DIAGNOSIS — K76.89: ICD-10-CM

## 2018-01-20 DIAGNOSIS — I08.3: ICD-10-CM

## 2018-01-20 DIAGNOSIS — K57.30: ICD-10-CM

## 2018-01-20 DIAGNOSIS — H91.90: ICD-10-CM

## 2018-01-20 DIAGNOSIS — I25.10: ICD-10-CM

## 2018-01-20 DIAGNOSIS — E78.5: ICD-10-CM

## 2018-01-20 DIAGNOSIS — K56.7: ICD-10-CM

## 2018-01-20 DIAGNOSIS — K21.0: ICD-10-CM

## 2018-01-20 DIAGNOSIS — Z88.5: ICD-10-CM

## 2018-01-20 DIAGNOSIS — Z51.5: ICD-10-CM

## 2018-01-20 DIAGNOSIS — Z87.891: ICD-10-CM

## 2018-01-20 DIAGNOSIS — I27.20: ICD-10-CM

## 2018-01-20 DIAGNOSIS — N39.0: ICD-10-CM

## 2018-01-20 DIAGNOSIS — K80.20: ICD-10-CM

## 2018-01-20 DIAGNOSIS — I95.9: ICD-10-CM

## 2018-01-20 DIAGNOSIS — H35.30: ICD-10-CM

## 2018-01-20 DIAGNOSIS — F41.9: ICD-10-CM

## 2018-01-20 DIAGNOSIS — B96.89: ICD-10-CM

## 2018-01-20 DIAGNOSIS — Z16.24: ICD-10-CM

## 2018-01-20 LAB
ALBUMIN SERPL-MCNC: 2.5 GM/DL (ref 3.4–5)
ALP SERPL-CCNC: 146 U/L (ref 45–117)
ALT SERPL-CCNC: 17 U/L (ref 10–53)
AST SERPL-CCNC: 21 U/L (ref 15–37)
BASOPHILS # BLD AUTO: 0 TH/MM3 (ref 0–0.2)
BASOPHILS NFR BLD: 0.1 % (ref 0–2)
BILIRUB SERPL-MCNC: 0.5 MG/DL (ref 0.2–1)
BUN SERPL-MCNC: 32 MG/DL (ref 7–18)
CALCIUM SERPL-MCNC: 8.4 MG/DL (ref 8.5–10.1)
CHLORIDE SERPL-SCNC: 95 MEQ/L (ref 98–107)
CREAT SERPL-MCNC: 0.91 MG/DL (ref 0.5–1)
EOSINOPHIL # BLD: 0 TH/MM3 (ref 0–0.4)
EOSINOPHIL NFR BLD: 0 % (ref 0–4)
ERYTHROCYTE [DISTWIDTH] IN BLOOD BY AUTOMATED COUNT: 20.4 % (ref 11.6–17.2)
GFR SERPLBLD BASED ON 1.73 SQ M-ARVRAT: 59 ML/MIN (ref 89–?)
GLUCOSE SERPL-MCNC: 149 MG/DL (ref 74–106)
HCO3 BLD-SCNC: 26.1 MEQ/L (ref 21–32)
HCT VFR BLD CALC: 39 % (ref 35–46)
HGB BLD-MCNC: 13 GM/DL (ref 11.6–15.3)
INR PPP: 1.1 RATIO
LYMPHOCYTES # BLD AUTO: 0.4 TH/MM3 (ref 1–4.8)
LYMPHOCYTES NFR BLD AUTO: 1.8 % (ref 9–44)
LYMPHOCYTES: 1 % (ref 9–44)
MCH RBC QN AUTO: 29 PG (ref 27–34)
MCHC RBC AUTO-ENTMCNC: 33.3 % (ref 32–36)
MCV RBC AUTO: 86.8 FL (ref 80–100)
MONOCYTE #: 2.3 TH/MM3 (ref 0–0.9)
MONOCYTES NFR BLD: 9.9 % (ref 0–8)
MONOCYTES: 5 % (ref 0–8)
MYELOCYTES NFR BLD: 1 % (ref 0–0)
NEUTROPHILS # BLD AUTO: 20.2 TH/MM3 (ref 1.8–7.7)
NEUTROPHILS NFR BLD AUTO: 88.2 % (ref 16–70)
NEUTS BAND # BLD MANUAL: 21.5 TH/MM3 (ref 1.8–7.7)
NEUTS BAND NFR BLD: 20 % (ref 0–6)
NEUTS SEG NFR BLD MANUAL: 73 % (ref 16–70)
OVALOCYTES BLD QL SMEAR: (no result)
PLATELET # BLD: 424 TH/MM3 (ref 150–450)
PMV BLD AUTO: 8.6 FL (ref 7–11)
PROT SERPL-MCNC: 6.3 GM/DL (ref 6.4–8.2)
PROTHROMBIN TIME: 11 SEC (ref 9.8–11.6)
RBC # BLD AUTO: 4.49 MIL/MM3 (ref 4–5.3)
SCHISTOCYTES BLD QL SMEAR: (no result)
SODIUM SERPL-SCNC: 133 MEQ/L (ref 136–145)
WBC # BLD AUTO: 22.9 TH/MM3 (ref 4–11)

## 2018-01-20 PROCEDURE — 93005 ELECTROCARDIOGRAM TRACING: CPT

## 2018-01-20 PROCEDURE — 85610 PROTHROMBIN TIME: CPT

## 2018-01-20 PROCEDURE — 80053 COMPREHEN METABOLIC PANEL: CPT

## 2018-01-20 PROCEDURE — 80048 BASIC METABOLIC PNL TOTAL CA: CPT

## 2018-01-20 PROCEDURE — 81001 URINALYSIS AUTO W/SCOPE: CPT

## 2018-01-20 PROCEDURE — 87506 IADNA-DNA/RNA PROBE TQ 6-11: CPT

## 2018-01-20 PROCEDURE — 85730 THROMBOPLASTIN TIME PARTIAL: CPT

## 2018-01-20 PROCEDURE — 87077 CULTURE AEROBIC IDENTIFY: CPT

## 2018-01-20 PROCEDURE — 74176 CT ABD & PELVIS W/O CONTRAST: CPT

## 2018-01-20 PROCEDURE — 84155 ASSAY OF PROTEIN SERUM: CPT

## 2018-01-20 PROCEDURE — 96374 THER/PROPH/DIAG INJ IV PUSH: CPT

## 2018-01-20 PROCEDURE — 87086 URINE CULTURE/COLONY COUNT: CPT

## 2018-01-20 PROCEDURE — 85007 BL SMEAR W/DIFF WBC COUNT: CPT

## 2018-01-20 PROCEDURE — 82948 REAGENT STRIP/BLOOD GLUCOSE: CPT

## 2018-01-20 PROCEDURE — 87493 C DIFF AMPLIFIED PROBE: CPT

## 2018-01-20 PROCEDURE — 94664 DEMO&/EVAL PT USE INHALER: CPT

## 2018-01-20 PROCEDURE — 85025 COMPLETE CBC W/AUTO DIFF WBC: CPT

## 2018-01-20 PROCEDURE — 74177 CT ABD & PELVIS W/CONTRAST: CPT

## 2018-01-20 PROCEDURE — 87186 SC STD MICRODIL/AGAR DIL: CPT

## 2018-01-20 PROCEDURE — 83735 ASSAY OF MAGNESIUM: CPT

## 2018-01-20 PROCEDURE — 83605 ASSAY OF LACTIC ACID: CPT

## 2018-01-20 PROCEDURE — 74018 RADEX ABDOMEN 1 VIEW: CPT

## 2018-01-20 PROCEDURE — 71045 X-RAY EXAM CHEST 1 VIEW: CPT

## 2018-01-20 PROCEDURE — 85027 COMPLETE CBC AUTOMATED: CPT

## 2018-01-20 RX ADMIN — POTASSIUM CHLORIDE SCH MLS/HR: 200 INJECTION, SOLUTION INTRAVENOUS at 23:06

## 2018-01-20 RX ADMIN — POTASSIUM CHLORIDE SCH MEQ: 20 TABLET, EXTENDED RELEASE ORAL at 23:01

## 2018-01-20 NOTE — PD
HPI


Chief Complaint:  GI Complaint


Time Seen by Provider:  18:15


Travel History


International Travel<30 days:  No


Contact w/Intl Traveler<30days:  No


Traveled to known affect area:  No





History of Present Illness


HPI


Patient is an 85-year-old female who comes in complaining of diarrhea and 

dehydration.  She says since Thursday, she has had multiple episodes of 

diarrhea.  She complains of pain to the left side of her abdomen.  She denies 

recent antibiotic use.  She has not noticed any blood in her stool.  She said 

some nausea, but no vomiting.  She says she feels dehydrated.  She denies fever 

or chills.  She has not taken anything for her symptoms.  Nothing seems to 

worsen her symptoms.





PFSH


Past Medical History


Hx Anticoagulant Therapy:  Yes


Anemia:  Yes


Arthritis:  Yes


Autoimmune Disease:  No


Anxiety:  Yes


Depression:  Yes


Heart Rhythm Problems:  No


Cancer:  No


Cardiovascular Problems:  Yes


High Cholesterol:  Yes


Chest Pain:  No


Congestive Heart Failure:  Yes (THIS ADMIT)


Developmental Delay:  Yes


Diabetes:  No


Diminished Hearing:  Yes


Endocrine:  No


GERD:  Yes


Glaucoma:  No


Genitourinary:  No


Hepatitis:  No


Hiatal Hernia:  No


Hypertension:  Yes


Immune Disorder:  No


Implanted Vascular Access Dvce:  Yes


Kidney Stones:  No


Medical other:  Yes (arthritis)


Musculoskeletal:  Yes


Neurologic:  No


Psychiatric:  Yes


Reproductive:  No


Respiratory:  No


Immunizations Current:  Yes


Renal Failure:  No


Thyroid Disease:  No


Ulcer:  No


Tetanus Vaccination:  < 5 Years


Influenza Vaccination:  Yes


Pregnant?:  Not Pregnant


Menopausal:  Yes





Past Surgical History


Abdominal Surgery:  Yes (HYSTERECTOMY)


Body Medical Devices:  FRANKIE EYE LENS


Cardiac Surgery:  No


Ear Surgery:  No


Endocrine Surgery:  No


Eye Surgery:  Yes (BILATERAL CATARACT SX; LEFT RETINA SX x2)


Genitourinary Surgery:  No


Gynecologic Surgery:  Yes (HYSTERECTOMY in 1982)


Hysterectomy:  Yes


Joint Replacement:  Yes (Bilateral Knee)


Oral Surgery:  No


Thoracic Surgery:  No


Other Surgery:  Yes (BILAT. TOTAL KNEE REPLACEMENTS)





Social History


Alcohol Use:  Yes (occas)


Tobacco Use:  No (QUIT 30 YRS)


Substance Use:  No





Allergies-Medications


(Allergen,Severity, Reaction):  


Coded Allergies:  


     morphine (Unverified  Allergy, Intermediate, 11/26/17)


Reported Meds & Prescriptions





Reported Meds & Active Scripts


Active


Potassium Chloride Microencaps 20 Meq Tab 20 Meq PO DAILY


Furosemide 20 Mg Tab 20 Mg PO DAILY


Zoloft (Sertraline HCl) 50 Mg Tab 150 Mg PO DAILY


Reported


Vitamin B12-Folic Acid (Cobalamine Combinations) 500-400 Mcg Tab 1 Tab PO DAILY


Pantoprazole (Pantoprazole Sodium) 40 Mg Tab 40 Mg PO DAILY


Poly-Iron 150 (Polysaccharide Iron Complex) 150 Mg Iron Cap 150 Mg PO DAILY


Atorvastatin (Atorvastatin Calcium) 20 Mg Tab 20 Mg PO HS


Ocuvite (Multiple Vitamins W/ Minerals) 1 Tab 1 Tab PO DAILY


Cetirizine (Cetirizine HCl) 10 Mg Tab 10 Mg PO DAILY


Xanax (Alprazolam) 0.25 Mg Tab 0.25 Mg PO TID PRN








Review of Systems


Except as stated in HPI:  all other systems reviewed are Neg


General / Constitutional:  No: Fever, Chills


HENT:  No: Headaches, Lightheadedness


Cardiovascular:  No: Chest Pain or Discomfort


Respiratory:  No: Shortness of Breath


Gastrointestinal:  Positive: Nausea, Diarrhea, Abdominal Pain, No: Vomiting


Genitourinary:  No: Dysuria


Musculoskeletal:  No: Myalgias, Edema


Skin:  No Rash, No Change in Pigmentation


Neurologic:  No: Weakness, Dizziness





Physical Exam


Narrative


GENERAL: Awake and alert, in no acute distress.


SKIN: Focused skin assessment warm/dry.


HEAD: Atraumatic. Normocephalic. 


EYES: Pupils equal and round. No scleral icterus. 


ENT: Dry mucous membranes.


NECK: Trachea midline. No JVD. 


CARDIOVASCULAR: Regular rate and rhythm.  No murmur appreciated.


RESPIRATORY: No accessory muscle use. Clear to auscultation. Breath sounds 

equal bilaterally. 


GASTROINTESTINAL: Abdomen soft, tender to palpation of the left side of the 

abdomen, nondistended.  No rebound or guarding.


MUSCULOSKELETAL: No obvious deformities. No clubbing.  No cyanosis.  No edema. 


NEUROLOGICAL: Awake and alert. No obvious cranial nerve deficits.  Motor 

grossly within normal limits. Normal speech.


PSYCHIATRIC: Appropriate mood and affect; insight and judgment normal.





Data


Data


Last Documented VS





Vital Signs








  Date Time  Temp Pulse Resp B/P (MAP) Pulse Ox O2 Delivery O2 Flow Rate FiO2


 


1/20/18 18:44     98 Room Air  


 


1/20/18 18:12 97.7 99 18     








Orders





 Orders


Complete Blood Count With Diff (1/20/18 18:40)


Comprehensive Metabolic Panel (1/20/18 18:40)


Lactic Acid (1/20/18 18:40)


Prothrombin Time / Inr (Pt) (1/20/18 18:40)


Act Partial Throm Time (Ptt) (1/20/18 18:40)


Urinalysis - C+S If Indicated (1/20/18 18:40)


Ct Abd/Pel W Iv Contrast(Rout) (1/20/18 18:40)


Iv Access Insert/Monitor (1/20/18 18:40)


Ecg Monitoring (1/20/18 18:40)


Oximetry (1/20/18 18:40)


Ondansetron Inj (Zofran Inj) (1/20/18 18:45)


Sodium Chlor 0.9% 1000 Ml Inj (Ns 1000 M (1/20/18 18:40)


Sodium Chloride 0.9% Flush (Ns Flush) (1/20/18 18:45)





Labs





Laboratory Tests








Test


  1/20/18


18:47


 


White Blood Count 22.9 TH/MM3 


 


Red Blood Count 4.49 MIL/MM3 


 


Hemoglobin 13.0 GM/DL 


 


Hematocrit 39.0 % 


 


Mean Corpuscular Volume 86.8 FL 


 


Mean Corpuscular Hemoglobin 29.0 PG 


 


Mean Corpuscular Hemoglobin


Concent 33.3 % 


 


 


Red Cell Distribution Width 20.4 % 


 


Platelet Count 424 TH/MM3 


 


Mean Platelet Volume 8.6 FL 


 


Neutrophils (%) (Auto) 88.2 % 


 


Lymphocytes (%) (Auto) 1.8 % 


 


Monocytes (%) (Auto) 9.9 % 


 


Eosinophils (%) (Auto) 0.0 % 


 


Basophils (%) (Auto) 0.1 % 


 


Neutrophils # (Auto) 20.2 TH/MM3 


 


Lymphocytes # (Auto) 0.4 TH/MM3 


 


Monocytes # (Auto) 2.3 TH/MM3 


 


Eosinophils # (Auto) 0.0 TH/MM3 


 


Basophils # (Auto) 0.0 TH/MM3 


 


CBC Comment AUTO DIFF 


 


Lactic Acid Level 2.8 mmol/L 











MDM


Medical Decision Making


Medical Screen Exam Complete:  Yes


Emergency Medical Condition:  Yes


Medical Record Reviewed:  Yes


Differential Diagnosis


Colitis versus diverticulitis versus UTI versus electrolyte abnormality versus 

dehydration


Narrative Course


Patient is an 85-year-old female comes in complaining of diarrhea and abdominal 

pain.  Exam shows tenderness to left side of the abdomen.  IV established, labs 

sent.  Patient given IV fluids and Zofran.





Patient signed out to Dr. Arzate to follow up testing and disposition the patient.











Genesis Smith MD Jan 20, 2018 19:16

## 2018-01-20 NOTE — RADRPT
EXAM DATE/TIME:  01/20/2018 21:52 

 

[facility] COMPARISON:     

CHEST SINGLE AP, November 26, 2017, 13:44.

 

                     

INDICATIONS :     

Shortness of breath.

                     

 

MEDICAL HISTORY :            

Cardiovascular disease. Congestive heart failure. Hypertension.   

 

SURGICAL HISTORY :        

Hysterectomy.

 

ENCOUNTER:     

Initial                                        

 

ACUITY:     

1 day      

 

PAIN SCORE:     

0/10

 

LOCATION:     

Bilateral chest 

 

FINDINGS:     

A single view of the chest demonstrates the lungs to be symmetrically aerated without evidence of mas
s, infiltrate or effusion. Left basilar scarring. The cardiomediastinal contours are unremarkable.  O
sseous structures are intact.

 

CONCLUSION:     

1. Left basilar scarring.

2. No pneumonia.

 

 

 

 Fritz Doyle MD on January 20, 2018 at 22:00           

Board Certified Radiologist.

 This report was verified electronically.

## 2018-01-20 NOTE — PD
Data


Data


Last Documented VS





Vital Signs








  Date Time  Temp Pulse Resp B/P (MAP) Pulse Ox O2 Delivery O2 Flow Rate FiO2


 


1/20/18 18:44     98 Room Air  


 


1/20/18 18:12 97.7 99 18     








Orders





 Orders


Complete Blood Count With Diff (1/20/18 18:40)


Comprehensive Metabolic Panel (1/20/18 18:40)


Lactic Acid (1/20/18 18:40)


Prothrombin Time / Inr (Pt) (1/20/18 18:40)


Act Partial Throm Time (Ptt) (1/20/18 18:40)


Urinalysis - C+S If Indicated (1/20/18 18:40)


Ct Abd/Pel W Iv Contrast(Rout) (1/20/18 18:40)


Iv Access Insert/Monitor (1/20/18 18:40)


Ecg Monitoring (1/20/18 18:40)


Oximetry (1/20/18 18:40)


Ondansetron Inj (Zofran Inj) (1/20/18 18:45)


Sodium Chlor 0.9% 1000 Ml Inj (Ns 1000 M (1/20/18 18:40)


Sodium Chloride 0.9% Flush (Ns Flush) (1/20/18 18:45)


Electrocardiogram (1/20/18 18:14)


Iohexol 350 Inj (Omnipaque 350 Inj) (1/20/18 20:19)


C Diff Toxin Pcr (1/20/18 20:21)


Enteric Path (Stool) (1/20/18 20:21)


Ciprofloxacin 400 Mg Premix (Cipro 400 M (1/20/18 20:30)


Metronidazole 500 Mg Inj (Flagyl 500 Mg (1/20/18 20:30)


Sodium Chlor 0.9% 1000 Ml Inj (Ns 1000 M (1/20/18 20:30)


Potassium Chlor 20 Meq Premix (Kcl 20 Me (1/20/18 20:45)


Admit To Inpatient (1/20/18 )


Code Status (1/20/18 21:18)


Vital Signs (Adult) Q4H (1/20/18 21:18)


Activity Oob With Assistance (1/20/18 21:18)


Diet Heart Healthy (1/21/18 Breakfast)


Sodium Chloride 0.9% Flush (Ns Flush) (1/20/18 21:30)


Sodium Chloride 0.9% Flush (Ns Flush) (1/21/18 09:00)


Acetaminophen (Tylenol) (1/20/18 21:30)


Ondansetron Inj (Zofran Inj) (1/20/18 21:30)


Basic Metabolic Panel (Bmp) (1/21/18 06:00)


Complete Blood Count With Diff (1/21/18 06:00)


Chest, Single Ap (1/20/18 21:18)


Electrocardiogram (1/20/18 21:18)


Pt Request For Service (1/20/18 21:18)


Scd Bilateral/Knee High LEENA.BID (1/20/18 21:18)


Naloxone Inj (Narcan Inj) (1/20/18 21:30)


Magnesium Hydroxide Liq (Milk Of Magnesi (1/20/18 21:30)


Inpatient Certification (1/20/18 )


Ns + Kcl 20 Meq Inj (Ns + Kcl 20 Meq Inj (1/20/18 21:30)


Potassium Chloride (Kcl) (1/20/18 21:30)


Metronidazole (Flagyl) (1/21/18 08:00)


Admit Order (Ed Use Only) (1/20/18 )


Alprazolam (Xanax) (1/20/18 21:30)


Atorvastatin (Lipitor) (1/21/18 21:00)


Cetirizine (Zyrtec) (1/21/18 09:00)


Multivit Opth (Ocuvite) (1/21/18 09:00)


Pantoprazole (Protonix) (1/21/18 09:00)


Polysaccharide Iron Complex (Nu-Iron) (1/21/18 09:00)


Potassium Chloride (Kcl) (1/21/18 09:00)


Sertraline (Zoloft) (1/21/18 09:00)


(Nf) Cobalamine Combinations (Vitamin B1 (1/21/18 09:00)





Labs





Laboratory Tests








Test


  1/20/18


18:47


 


White Blood Count 22.9 TH/MM3 


 


Red Blood Count 4.49 MIL/MM3 


 


Hemoglobin 13.0 GM/DL 


 


Hematocrit 39.0 % 


 


Mean Corpuscular Volume 86.8 FL 


 


Mean Corpuscular Hemoglobin 29.0 PG 


 


Mean Corpuscular Hemoglobin


Concent 33.3 % 


 


 


Red Cell Distribution Width 20.4 % 


 


Platelet Count 424 TH/MM3 


 


Mean Platelet Volume 8.6 FL 


 


Neutrophils (%) (Auto) 88.2 % 


 


Lymphocytes (%) (Auto) 1.8 % 


 


Monocytes (%) (Auto) 9.9 % 


 


Eosinophils (%) (Auto) 0.0 % 


 


Basophils (%) (Auto) 0.1 % 


 


Neutrophils # (Auto) 20.2 TH/MM3 


 


Lymphocytes # (Auto) 0.4 TH/MM3 


 


Monocytes # (Auto) 2.3 TH/MM3 


 


Eosinophils # (Auto) 0.0 TH/MM3 


 


Basophils # (Auto) 0.0 TH/MM3 


 


CBC Comment AUTO DIFF 


 


Differential Total Cells


Counted 100 


 


 


Neutrophils % (Manual) 73 % 


 


Band Neutrophils % 20 % 


 


Lymphocytes % 1 % 


 


Monocytes % 5 % 


 


Neutrophils # (Manual) 21.5 TH/MM3 


 


Myelocytes 1 % 


 


Differential Comment


  FINAL DIFF


MANUAL


 


Platelet Estimate HIGH 


 


Platelet Morphology Comment NORMAL 


 


Ovalocytes 1+ 


 


Keratocytes 1+ 


 


Prothrombin Time 11.0 SEC 


 


Prothromb Time International


Ratio 1.1 RATIO 


 


 


Activated Partial


Thromboplast Time 20.9 SEC 


 


 


Blood Urea Nitrogen 32 MG/DL 


 


Creatinine 0.91 MG/DL 


 


Random Glucose 149 MG/DL 


 


Total Protein 6.3 GM/DL 


 


Albumin 2.5 GM/DL 


 


Calcium Level 8.4 MG/DL 


 


Alkaline Phosphatase 146 U/L 


 


Aspartate Amino Transf


(AST/SGOT) 21 U/L 


 


 


Alanine Aminotransferase


(ALT/SGPT) 17 U/L 


 


 


Total Bilirubin 0.5 MG/DL 


 


Sodium Level 133 MEQ/L 


 


Potassium Level 2.6 MEQ/L 


 


Chloride Level 95 MEQ/L 


 


Carbon Dioxide Level 26.1 MEQ/L 


 


Anion Gap 12 MEQ/L 


 


Estimat Glomerular Filtration


Rate 59 ML/MIN 


 


 


Lactic Acid Level 2.8 mmol/L 











[facility]


Supervised Visit with ANALILIA:  No


Interpretation(s)


LAB:


CBC remarkable for leukocytosis, white count 22,000, 20% bands


CMP remarkable for potassium of 2.6, BUN 32


Lactate 2.8


Coags unremarkable.





CT abdomen pelvis: Diffuse colitis with slight inflammatory changes.  Scattered 

diverticulosis.  No perforation.


Narrative Course


85-year-old woman presents with 2 days, diarrhea left-sided abdominal pain.  No 

blood.  No fevers.  Feels dehydrated.  Initially seen by Dr. Boo signed out 

to me to follow on the results of diagnostic testing.





No recent antibiotic exposures.  No recent hospitalizations.





On exam, mild left-sided tenderness.  No guarding or peritonitis.





Lab show significant leukocytosis, elevated BUN likely dehydration, as well as 

low potassium.  CT scan shows diffuse colitis.  Stool sent for C. difficile 

testing.  Patient will be admitted.  Cipro Flagyl for possible colitis or 

abdominal sepsis.


Physician Communication


Physician Communication


Spoke with Dr. Puckett, will admit patient.





Diagnosis





 Primary Impression:  


 Colitis





Admitting Information


Admitting Physician Requests:  Admit











Sung Arzate MD Jan 20, 2018 20:40

## 2018-01-20 NOTE — RADRPT
EXAM DATE/TIME:  01/20/2018 20:06 

 

[facility] COMPARISON:     

CT ABDOMEN & PELVIS W CONTRAST, June 01, 2017, 20:43.

 

 

INDICATIONS :     

Diarrhea X 3 days.

                      

 

IV CONTRAST:     

72 cc Omnipaque 350 (iohexol) IV 

 

 

ORAL CONTRAST:      

No oral contrast ingested.

                      

 

RADIATION DOSE:     

6.91 CTDIvol (mGy) 

 

 

MEDICAL HISTORY :     

Cardiovascular disease. Congestive heart failure. Hypertension.

 

SURGICAL HISTORY :      

Hysterectomy. 

 

ENCOUNTER:      

Initial

 

ACUITY:      

3 days

 

PAIN SCALE:      

5/10

 

LOCATION:         

abdomen

 

TECHNIQUE:     

Volumetric scanning of the abdomen and pelvis was performed.  Using automated exposure control and ad
justment of the mA and/or kV according to patient size, radiation dose was kept as low as reasonably 
achievable to obtain optimal diagnostic quality images.  DICOM format image data is available electro
nically for review and comparison.  

 

FINDINGS:     

 

LOWER LUNGS:     

The visualized lower lungs are clear.

 

LIVER:     

Homogeneous density without lesion.  There is no dilation of the biliary tree. Calcified gallstones s
een. Hepatic low density in the left lobe.

 

SPLEEN:     

Normal size without lesion.

 

PANCREAS:     

Within normal limits.

 

KIDNEYS:     

Normal in size and shape.  There is no mass, stone or hydronephrosis. Right renal cyst.

 

ADRENAL GLANDS:     

Within normal limits.

 

VASCULAR:     

There is no aortic aneurysm.

 

BOWEL/MESENTERY:     

Diffuse wall thickening throughout the large bowel. Scattered diverticulosis.  There is no free intra
peritoneal air. Minimal fluid adjacent to the liver. Small hiatal hernia.

 

ABDOMINAL WALL:     

Within normal limits.

 

RETROPERITONEUM:     

There is no lymphadenopathy. 

 

BLADDER:     

No wall thickening or mass. 

 

REPRODUCTIVE:     

Within normal limits.

 

INGUINAL:     

There is no lymphadenopathy or hernia. 

 

MUSCULOSKELETAL:     

Within normal limits for patient age. 

 

CONCLUSION:     

1. Diffuse colitis with slight inflammatory changes. There is also scattered diverticulosis. No perfo
ration.

2. Minimal ascites.

3. Calcified gallstones.

4. Hepatic and right renal densities likely cysts

 

 

 

 Fritz Doyle MD on January 20, 2018 at 20:25           

Board Certified Radiologist.

 This report was verified electronically.

## 2018-01-21 VITALS
TEMPERATURE: 98 F | DIASTOLIC BLOOD PRESSURE: 82 MMHG | HEART RATE: 113 BPM | OXYGEN SATURATION: 92 % | SYSTOLIC BLOOD PRESSURE: 140 MMHG | RESPIRATION RATE: 16 BRPM

## 2018-01-21 VITALS — HEART RATE: 105 BPM

## 2018-01-21 VITALS
SYSTOLIC BLOOD PRESSURE: 188 MMHG | TEMPERATURE: 96.9 F | RESPIRATION RATE: 20 BRPM | HEART RATE: 116 BPM | DIASTOLIC BLOOD PRESSURE: 99 MMHG | OXYGEN SATURATION: 94 %

## 2018-01-21 VITALS
DIASTOLIC BLOOD PRESSURE: 73 MMHG | OXYGEN SATURATION: 93 % | SYSTOLIC BLOOD PRESSURE: 136 MMHG | TEMPERATURE: 98.6 F | RESPIRATION RATE: 18 BRPM | HEART RATE: 106 BPM

## 2018-01-21 VITALS
OXYGEN SATURATION: 93 % | SYSTOLIC BLOOD PRESSURE: 138 MMHG | RESPIRATION RATE: 17 BRPM | TEMPERATURE: 96 F | HEART RATE: 105 BPM | DIASTOLIC BLOOD PRESSURE: 79 MMHG

## 2018-01-21 VITALS — HEART RATE: 102 BPM

## 2018-01-21 VITALS
OXYGEN SATURATION: 96 % | DIASTOLIC BLOOD PRESSURE: 75 MMHG | SYSTOLIC BLOOD PRESSURE: 128 MMHG | TEMPERATURE: 96.5 F | HEART RATE: 104 BPM | RESPIRATION RATE: 18 BRPM

## 2018-01-21 VITALS
SYSTOLIC BLOOD PRESSURE: 136 MMHG | HEART RATE: 109 BPM | TEMPERATURE: 96.4 F | OXYGEN SATURATION: 91 % | RESPIRATION RATE: 18 BRPM | DIASTOLIC BLOOD PRESSURE: 78 MMHG

## 2018-01-21 LAB
AMORPHOUS SEDIMENT, URINE: (no result)
BACTERIA #/AREA URNS HPF: (no result) /HPF
BASOPHILS # BLD AUTO: 0 TH/MM3 (ref 0–0.2)
BASOPHILS NFR BLD: 0.1 % (ref 0–2)
BUN SERPL-MCNC: 25 MG/DL (ref 7–18)
CALCIUM SERPL-MCNC: 8 MG/DL (ref 8.5–10.1)
CHLORIDE SERPL-SCNC: 103 MEQ/L (ref 98–107)
COLOR UR: YELLOW
CREAT SERPL-MCNC: 0.63 MG/DL (ref 0.5–1)
EOSINOPHIL # BLD: 0 TH/MM3 (ref 0–0.4)
EOSINOPHIL NFR BLD: 0.1 % (ref 0–4)
ERYTHROCYTE [DISTWIDTH] IN BLOOD BY AUTOMATED COUNT: 20.7 % (ref 11.6–17.2)
GFR SERPLBLD BASED ON 1.73 SQ M-ARVRAT: 90 ML/MIN (ref 89–?)
GLUCOSE SERPL-MCNC: 143 MG/DL (ref 74–106)
GLUCOSE UR STRIP-MCNC: (no result) MG/DL
HCO3 BLD-SCNC: 26.9 MEQ/L (ref 21–32)
HCT VFR BLD CALC: 37.1 % (ref 35–46)
HGB BLD-MCNC: 12 GM/DL (ref 11.6–15.3)
HGB UR QL STRIP: (no result)
KETONES UR STRIP-MCNC: (no result) MG/DL
LYMPHOCYTES # BLD AUTO: 0.6 TH/MM3 (ref 1–4.8)
LYMPHOCYTES NFR BLD AUTO: 2.2 % (ref 9–44)
MAGNESIUM SERPL-MCNC: 2.5 MG/DL (ref 1.5–2.5)
MCH RBC QN AUTO: 28 PG (ref 27–34)
MCHC RBC AUTO-ENTMCNC: 32.4 % (ref 32–36)
MCV RBC AUTO: 86.2 FL (ref 80–100)
MONOCYTE #: 1.7 TH/MM3 (ref 0–0.9)
MONOCYTES NFR BLD: 6.1 % (ref 0–8)
MONOCYTES: 5 % (ref 0–8)
NEUTROPHILS # BLD AUTO: 25.5 TH/MM3 (ref 1.8–7.7)
NEUTROPHILS NFR BLD AUTO: 91.5 % (ref 16–70)
NEUTS BAND # BLD MANUAL: 26.4 TH/MM3 (ref 1.8–7.7)
NEUTS BAND NFR BLD: 13 % (ref 0–6)
NEUTS SEG NFR BLD MANUAL: 82 % (ref 16–70)
NITRITE UR QL STRIP: (no result)
OVALOCYTES BLD QL SMEAR: (no result)
PLATELET # BLD: 398 TH/MM3 (ref 150–450)
PMV BLD AUTO: 8.5 FL (ref 7–11)
RBC # BLD AUTO: 4.3 MIL/MM3 (ref 4–5.3)
SODIUM SERPL-SCNC: 135 MEQ/L (ref 136–145)
SP GR UR STRIP: (no result) (ref 1–1.03)
SQUAMOUS #/AREA URNS HPF: 1 /HPF (ref 0–5)
URINE LEUKOCYTE ESTERASE: (no result)
WBC # BLD AUTO: 27.8 TH/MM3 (ref 4–11)

## 2018-01-21 RX ADMIN — POTASSIUM CHLORIDE SCH MLS/HR: 200 INJECTION, SOLUTION INTRAVENOUS at 03:28

## 2018-01-21 RX ADMIN — PHENYTOIN SODIUM SCH MLS/HR: 50 INJECTION INTRAMUSCULAR; INTRAVENOUS at 13:47

## 2018-01-21 RX ADMIN — POTASSIUM CHLORIDE SCH MEQ: 20 TABLET, EXTENDED RELEASE ORAL at 01:29

## 2018-01-21 RX ADMIN — SERTRALINE HYDROCHLORIDE SCH MG: 50 TABLET, FILM COATED ORAL at 08:06

## 2018-01-21 RX ADMIN — Medication SCH MG: at 08:09

## 2018-01-21 RX ADMIN — Medication SCH ML: at 08:09

## 2018-01-21 RX ADMIN — PANTOPRAZOLE SCH MG: 40 TABLET, DELAYED RELEASE ORAL at 08:07

## 2018-01-21 RX ADMIN — CETIRIZINE HYDROCHLORIDE SCH MG: 10 TABLET, FILM COATED ORAL at 08:07

## 2018-01-21 RX ADMIN — Medication SCH ML: at 20:13

## 2018-01-21 RX ADMIN — ONDANSETRON PRN MG: 2 INJECTION, SOLUTION INTRAMUSCULAR; INTRAVENOUS at 02:26

## 2018-01-21 RX ADMIN — ONDANSETRON PRN MG: 2 INJECTION, SOLUTION INTRAMUSCULAR; INTRAVENOUS at 08:04

## 2018-01-21 RX ADMIN — ONDANSETRON PRN MLS/HR: 2 INJECTION, SOLUTION INTRAMUSCULAR; INTRAVENOUS at 12:47

## 2018-01-21 RX ADMIN — VITAMIN C SCH TAB: TAB at 08:09

## 2018-01-21 RX ADMIN — ACETAMINOPHEN PRN MG: 325 TABLET ORAL at 20:14

## 2018-01-21 RX ADMIN — ATORVASTATIN CALCIUM SCH MG: 20 TABLET, FILM COATED ORAL at 20:14

## 2018-01-21 RX ADMIN — Medication SCH TAB: at 08:10

## 2018-01-21 RX ADMIN — POTASSIUM CHLORIDE SCH MEQ: 20 TABLET, EXTENDED RELEASE ORAL at 08:07

## 2018-01-21 NOTE — EKG
Date Performed: 2018       Time Performed: 18:14:41

 

PTAGE:      85 years

 

EKG:      Sinus rhythm 

 

 POSSIBLE LEFT ATRIAL ENLARGEMENT LEFT VENTRICULAR HYPERTROPHY AND ST-T CHANGE ABNORMAL ECG Compared 
to 

 

 PREVIOUS TRACING            , previously seen ST changes have improved somewhat. PREVIOUS TRACIN
2017 13.31

 

DOCTOR:   Alvin Edge  Interpretating Date/Time  2018 12:57:18

## 2018-01-21 NOTE — HHI.HP
HPI


Service


[facility]


Primary Care Physician


Flower Gamez MD


Admission Diagnosis





Colitis, hypokalemia


Chief Complaint:  


Diarrhea 4 days


Travel History


International Travel<30 Days:  No


Contact w/Intl Traveler <30 Da:  No


Traveled to Known Affected Are:  No


History of Present Illness


Mrs. Dillon is a 86 y/o WF with iron deficiency anemia/GIB/SB angiectasia, 

Valdez's esophagitis, diverticulosis, severe aortic stenosis, CHF, HTN, severe 

aortic stenosis and hyperlipidemia. She has had multiple prior admissions. 

Previous admission from - for SOB and felt to have acute CHF symptoms 

related to severe AVS. She had an echocardiogram (17) which noted severe 

aortic valve stenosis and she was initially decompensated. Pt was diuresed with 

improvement. She was evaluated with a  LHC and consideration for AVR and this 

was performed on  and showed critical AS but nonobstructive CAD. She was 

seen by CTS who did the initial evaluation for TAVR vs traditional AVR. It was 

decided to send the pt home on ACE/diuretic/ASA/Plavix and monitored for 

bleeding as she has a hx of previous GIB. Pt has had ongoing issues with anemia 

and possible GIB. She was previously admitted in 2017 and underwent Small 

bowel enteroscopy/incomplete colonoscopy (17) which noted thickened fold in 

the duodenum, otherwise normal enteroscopy and unfortunately the scope was 

unable to be passed beyond the sigmoid 25 cm secondary to either twisted colon, 

spasm or external compression, it was felt to not be safe to push the scope 

without seeing the lumen because of fear of perforation so endoscopy was 

terminated. As an outpt she had Capsule endoscopy (17) which noted multiple 

angiectasia with active bleeding and pt was sent for antegrade single balloon 

enteroscopy (17) but unfortunately there was no active bleeding found and 

the enteroscopy was found to be normal except for a hiatal hernia. Pt was 

readmitted from 17 to 17 with anemia. During that admission she was 

transfused with a total of 6 units of PRBCs with improvement. Her ASA/Plavix 

were stopped at that time. She was seen by GI and Hematology during that 

admission and GI recommended double balloon enteroscopy to be performed at a 

tertiary center as an outpt. Hematology recommended transfusing iron sucrose 

which she received.





[place] [facility] [place] complaints of weakness and 

chest discomfort.  Per patient she woke up weak and some chest discomfort.  She 

follows with Dr. Newton and was seen in . Pt is reportedly supposed to have 

an iron replacement therapy tomorrow. She denies any obvious GIB. Her H/H at 

admission was 7.4/23.7. Prior to that her labs on  were Hgb 8.6/Hct 26.6. 

CXR in the ED with no acute disease. At the time of our evaluation in the ED pt 

found to be in respiratory distress. She had been given one unit of PRBCs and 

appears to be in some degree of volume overload. Pt to be given IV Lasix and a 

breathing treatment STAT.





Patient presents to the ER 17 with complaints of chest pain.  Chest 

pressure/heaviness located across anterior chest.  Serial troponins negative 

patient was given packed red blood cells symptomatic symptoms improved, the 

patient was discharged that she should keep her scheduled appointment at the 

[facility].





2017 patient was seen at [facility] ER evaluated with recommendations for 

double-balloon enteroscopy which was tentatively scheduled for 2017.  Patient declined double-balloon enteroscopy and has not the procedure 

completed.





2018 patient presents to the emergency department with complaints of 

multiple episodes of diarrhea for the past 4 days associated with left sided 

abdominal pain.  Patient describes the pain as a intermittent cramping 

sensation moderate in severity.  Patient denies recent antibiotic use or 

evidence of bleeding in the stool.  Patient does endorse nausea but no 

vomiting.  Patient denies fevers chills chest pain or shortness of breath.





CT of abdomen (18) reviewed and reveals: Diffuse colitis with slight 

inflammatory changes.  There is also scattered diverticulosis.  No perforation.

  Mild ascites.  Also had gallstones.  Hepatic and right renal disease likely 

cyst.


Stool positive for C. difficile patient started on Flagyl


Laboratory data reviewed.  Blood cell count 22.5 potassium 2.6 magnesium pending

, lactic acid 2.8 initially 1.1 after hydration, sodium 133, BUN 32 creatinine 

0.91 this may GFR 59


Urinalysis reviewed positive for nitrates with small amount leukocyte esterase 

culture indicated and pending





Review of Systems


Constitutional:  COMPLAINS OF: Fatigue, DENIES: Fever, Chills


Eyes:  DENIES: Blurred vision, Diplopia, Vision loss


Respiratory:  DENIES: Cough, Sputum production, Shortness of breath


Cardiovascular:  COMPLAINS OF: Dyspnea on Exertion, DENIES: Chest pain, 

Palpitations, Lower Extremity Edema


Gastrointestinal:  COMPLAINS OF: Abdominal pain, Diarrhea, Nausea, DENIES: 

Black stools, Bloody stools, BRB per rectum, Constipation, Vomiting


Neurologic:  DENIES: Abnormal gait, Headache, Localized weakness, Speech 

Problems


Psychiatric:  DENIES: Anxiety, Confusion, Depression





Past Family Social History


Past Medical History


Iron deficiency anemia/GIB/SB angiectasia


Severe aortic stenosis, LHC was performed on  and showed critical AS but 

nonobstructive CAD. She was seen by CTS who did the initial evaluation for TAVR 

vs traditional AVR.


Valdez's esophagus


CHF


Diverticulosis


HTN


Hyperlipidemia


Lumbar stenosis


Macular degeneration


Depression/Anxiety





2D echocardiogram (17) which noted severe aortic valve stenosis





Past Surgical History


Antegrade single balloon enteroscopy (17) --> Normal enteroscopy except for 

a hiatal hernia


Capsule endoscopy (17) --> Multiple angiectasia with active bleeding


Small bowel enteroscopy (17) --> thickened fold in the duodenum Bx done 

otherwise normal


Colonoscopy (17) --> Scope was unable to be passed beyond the sigmoid 25 cm 

secondary to either twisted colon, spasm or external compression, it was felt 

to not


 be safe to push the scope without seeing the lumen because of fear of 

perforation so endoscopy was terminated


Colonoscopy (16) --> Severe diverticulosis in the left colon, colitis, 

hemorrhoids, small polyp


EGD/colonoscopy (16) --> gastritis, HH, esophagitis, diverticulosis, 

hemorrhoids, polyps, incomplete colonoscopy


Colonoscopy (2/10/16) --> Very tortuous, edematous sigmoid colon, possible 

diverticular colitis, diminutive polyps in the ascending colon, sigmoid 

diverticulosis, grade I hemorrhoids,     decreased sphincter tone. 


Cataract surgery


Knee arthroscopy/arthroplasty


[facility]


Reported Medications


Potassium Chloride Microencaps 20 Meq Tab 20 Meq PO DAILY


Furosemide 20 Mg Tab 20 Mg PO DAILY


Zoloft (Sertraline HCl) 50 Mg Tab 150 Mg PO DAILY


Vitamin B12-Folic Acid (Cobalamine Combinations) 500-400 Mcg Tab 1 Tab PO DAILY


Pantoprazole (Pantoprazole Sodium) 40 Mg Tab 40 Mg PO DAILY


Poly-Iron 150 (Polysaccharide Iron Complex) 150 Mg Iron Cap 150 Mg PO DAILY


Atorvastatin (Atorvastatin Calcium) 20 Mg Tab 20 Mg PO HS


Ocuvite (Multiple Vitamins W/ Minerals) 1 Tab 1 Tab PO DAILY


Cetirizine (Cetirizine HCl) 10 Mg Tab 10 Mg PO DAILY


Xanax (Alprazolam) 0.25 Mg Tab 0.25 Mg PO TID PRN


Allergies:  


Coded Allergies:  


     morphine (Unverified  Allergy, Intermediate, 18)


Active Ordered Medications





Current Medications








 Medications


  (Trade)  Dose


 Ordered  Sig/Brigitte


 Route  Start Time


 Stop Time Status Last Admin


 


  (NS Flush)  2 ml  UNSCH  PRN


 IV FLUSH  18 21:30


     


 


 


  (NS Flush)  2 ml  BID


 IV FLUSH  18 09:00


    18 08:09


 


 


  (Tylenol)  650 mg  Q4H  PRN


 PO  18 21:30


     


 


 


  (Zofran Inj)  4 mg  Q6H  PRN


 IVP  18 21:30


    18 08:04


 


 


  (Narcan Inj)  0.4 mg  UNSCH  PRN


 IV PUSH  18 21:30


     


 


 


  (Milk Of


 Magnesia Liq)  30 ml  Q12H  PRN


 PO  18 21:30


     


 


 


 Potassium


 Chloride/Sodium


 Chloride  1,000 ml @ 


 50 mls/hr  Q20H


 IV  18 21:30


     


 


 


  (Flagyl)  500 mg  Q8HR


 PO  18 08:00


    18 08:06


 


 


  (Xanax)  0.25 mg  TID  PRN


 PO  18 21:30


    18 23:10


 


 


  (Lipitor)  20 mg  HS


 PO  18 21:00


     


 


 


  (ZyrTEC)  10 mg  DAILY


 PO  18 09:00


    18 08:07


 


 


  (Ocuvite)  1 tab  DAILY


 PO  18 09:00


    18 08:10


 


 


  (Protonix)  40 mg  DAILY


 PO  18 09:00


    18 08:07


 


 


  (Nu-Iron)  150 mg  DAILY


 PO  18 09:00


    18 08:09


 


 


  (KCl)  20 meq  DAILY


 PO  18 09:00


    18 08:07


 


 


  (Zoloft)  150 mg  DAILY


 PO  18 09:00


    18 08:06


 


 


  (Allbee C)  1 tab  DAILY


 PO  18 09:00


    18 08:09


 








Family History


Noncontributory


Social History


Denies any tobacco use


Occasionally drinks beer or vodka


Pt lives alone, she is a 


Her children live out of town


She has a family friend who looks after her here locally





Physical Exam


Vital Signs





Vital Signs








  Date Time  Temp Pulse Resp B/P (MAP) Pulse Ox O2 Delivery O2 Flow Rate FiO2


 


18 05:30 98.6 106 18 136/73 (94) 93   


 


18 04:05 96.5 104 18 128/75 (92) 96   


 


18 03:50  102      


 


18 00:51  105      


 


18 00:46  105      


 


18 23:50 96.9 106 18 154/79 (104) 99   


 


18 22:41        


 


18 18:44     98 Room Air  


 


18 18:12 97.7 99 18 144/74 (97) 98 Room Air  


 


18 18:12   18     








Physical Exam


GENERAL: This is an 85 year old female patient, well-developed patient, appears 

fatigued


SKIN: No rashes, ecchymoses or lesions. Cool and dry.


HEAD: Atraumatic. Normocephalic. No temporal or scalp tenderness.


EYES: Extraocular motions intact. No scleral icterus. No injection or drainage. 


CARDIOVASCULAR: Regular rate and rhythm with 4/6 systolic murmur


RESPIRATORY: Clear to auscultation. Breath sounds equal bilaterally. 


GASTROINTESTINAL: Abdomen soft, tender mostly LLQ, nondistended. 


MUSCULOSKELETAL: Extremities without clubbing, cyanosis, or edema. No joint 

tenderness, effusion, or edema noted. No calf tenderness. Negative Homans sign 

bilaterally.


NEUROLOGICAL: Awake and alert. No focal deficits noted.  Motor and sensory 

grossly within normal limits. 4 out of 5 muscle strength in all muscle groups.  

Normal speech.


Laboratory





Laboratory Tests








Test


  18


18:47 18


21:45 18


01:25 18


02:00


 


White Blood Count 22.9    


 


Red Blood Count 4.49    


 


Hemoglobin 13.0    


 


Hematocrit 39.0    


 


Mean Corpuscular Volume 86.8    


 


Mean Corpuscular Hemoglobin 29.0    


 


Mean Corpuscular Hemoglobin


Concent 33.3 


  


  


  


 


 


Red Cell Distribution Width 20.4    


 


Platelet Count 424    


 


Mean Platelet Volume 8.6    


 


Neutrophils (%) (Auto) 88.2    


 


Lymphocytes (%) (Auto) 1.8    


 


Monocytes (%) (Auto) 9.9    


 


Eosinophils (%) (Auto) 0.0    


 


Basophils (%) (Auto) 0.1    


 


Neutrophils # (Auto) 20.2    


 


Lymphocytes # (Auto) 0.4    


 


Monocytes # (Auto) 2.3    


 


Eosinophils # (Auto) 0.0    


 


Basophils # (Auto) 0.0    


 


CBC Comment AUTO DIFF    


 


Differential Total Cells


Counted 100 


  


  


  


 


 


Neutrophils % (Manual) 73    


 


Band Neutrophils % 20    


 


Lymphocytes % 1    


 


Monocytes % 5    


 


Neutrophils # (Manual) 21.5    


 


Myelocytes 1    


 


Differential Comment


  FINAL DIFF


MANUAL 


  


  


 


 


Platelet Estimate HIGH    


 


Platelet Morphology Comment NORMAL    


 


Ovalocytes 1+    


 


Keratocytes 1+    


 


Prothrombin Time 11.0    


 


Prothromb Time International


Ratio 1.1 


  


  


  


 


 


Activated Partial


Thromboplast Time 20.9 


  


  


  


 


 


Blood Urea Nitrogen 32    


 


Creatinine 0.91    


 


Random Glucose 149    


 


Total Protein 6.3    


 


Albumin 2.5    


 


Calcium Level 8.4    


 


Alkaline Phosphatase 146    


 


Aspartate Amino Transf


(AST/SGOT) 21 


  


  


  


 


 


Alanine Aminotransferase


(ALT/SGPT) 17 


  


  


  


 


 


Total Bilirubin 0.5    


 


Sodium Level 133    


 


Potassium Level 2.6    


 


Chloride Level 95    


 


Carbon Dioxide Level 26.1    


 


Anion Gap 12    


 


Estimat Glomerular Filtration


Rate 59 


  


  


  


 


 


Lactic Acid Level 2.8   1.1  


 


Stool C. difficile Toxin (PCR)  POSITIVE   


 


Stl C. difficile Toxin


Epiderm 027 


  PRESUMPTIVE


NEGATIVE 


  


 


 


Urine Color    YELLOW 


 


Urine Turbidity    CLEAR 


 


Urine pH    6.5 


 


Urine Specific Gravity


  


  


  


  GREATER THAN


1.050


 


Urine Protein    30 


 


Urine Glucose (UA)    NEG 


 


Urine Ketones    NEG 


 


Urine Occult Blood    NEG 


 


Urine Nitrite    POS 


 


Urine Bilirubin    NEG 


 


Urine Urobilinogen    2.0 


 


Urine Leukocyte Esterase    SMALL 


 


Urine RBC    1 


 


Urine WBC    8 


 


Urine Squamous Epithelial


Cells 


  


  


  1 


 


 


Urine Amorphous Sediment    RARE 


 


Urine Bacteria    MANY 


 


Microscopic Urinalysis Comment


  


  


  


  CULTURE


INDICATED














 Date/Time


Source Procedure


Growth Status


 


 


 18 21:45


Stool Stool 


Pending Received


 


 18 02:00


Urine Random Urine Urine Culture


Pending Received








Result Diagram:  


18





Imaging





Last Impressions








Chest X-Ray 18 Signed





Impressions: 





 Service Date/Time:  2018 21:52 - CONCLUSION:  1. Left 





 basilar scarring. 2. No pneumonia.     Fritz Doyle MD 


 


Abdomen/Pelvis CT 18 1840 Signed





Impressions: 





 Service Date/Time:  2018 20:06 - CONCLUSION:  1. Diffuse 





 colitis with slight inflammatory changes. There is also scattered 





 diverticulosis. No perforation. 2. Minimal ascites. 3. Calcified gallstones. 

4. 





 Hepatic and right renal densities likely cysts     Eric F. Tocci, MD Caprini VTE Risk Assessment


Caprini VTE Risk Assessment:  Mod/High Risk (score >= 2)


Caprini Risk Assessment Model











 Point Value = 1          Point Value = 2  Point Value = 3  Point Value = 5


 


Age 41-60


Minor surgery


BMI > 25 kg/m2


Swollen legs


Varicose veins


Pregnancy or postpartum


History of unexplained or recurrent


   spontaneous 


Oral contraceptives or hormone


   replacement


Sepsis (< 1 month)


Serious lung disease, including


   pneumonia (< 1 month)


Abnormal pulmonary function


Acute myocardial infarction


Congestive heart failure (< 1 month)


History of inflammatory bowel disease


Medical patient at bed rest Age 61-74


Arthroscopic surgery


Major open surgery (> 45 min)


Laparoscopic surgery (> 45 min)


Malignancy


Confined to bed (> 72 hours)


Immobilizing plaster cast


Central venous access Age >= 75


History of VTE


Family history of VTE


Factor V Leiden


Prothrombin 04489M


Lupus anticoagulant


Anticardiolipin antibodies


Elevated serum homocysteine


Heparin-induced thrombocytopenia


Other congenital or acquired


   thrombophilia Stroke (< 1 month)


Elective arthroplasty


Hip, pelvis, or leg fracture


Acute spinal cord injury (< 1 month)








Prophylaxis Regimen











   Total Risk


Factor Score Risk Level Prophylaxis Regimen


 


0-1      Low Early ambulation


 


2 Moderate Order ONE of the following:


*Sequential Compression Device (SCD)


*Heparin 5000 units SQ BID


 


3-4 Higher Order ONE of the following medications:


*Heparin 5000 units SQ TID


*Enoxaparin/Lovenox 40 mg SQ daily (WT < 150 kg, CrCl > 30 mL/min)


*Enoxaparin/Lovenox 30 mg SQ daily (WT < 150 kg, CrCl > 10-29 mL/min)


*Enoxaparin/Lovenox 30 mg SQ BID (WT < 150 kg, CrCl > 30 mL/min)


AND/OR


*Sequential Compression Device (SCD)


 


5 or more Highest Order ONE of the following medications:


*Heparin 5000 units SQ TID (Preferred with Epidurals)


*Enoxaparin/Lovenox 40 mg SQ daily (WT < 150 kg, CrCl > 30 mL/min)


*Enoxaparin/Lovenox 30 mg SQ daily (WT < 150 kg, CrCl > 10-29 mL/min)


*Enoxaparin/Lovenox 30 mg SQ BID (WT < 150 kg, CrCl > 30 mL/min)


AND


*Sequential Compression Device (SCD)











Assessment and Plan


Problem List:  


(1) C. difficile colitis


ICD Codes:  A04.72 - Enterocolitis due to Clostridium difficile, not specified 

as recurrent


Plan:  Patient presents to the emergency department with complaints of multiple 

episodes of diarrhea for the past 4 days associated with left sided abdominal 

pain.  Patient describes the pain as a intermittent cramping sensation moderate 

in severity.  Patient denies recent antibiotic use or evidence of bleeding in 

the stool.  Patient does endorse nausea but no vomiting.  Patient denies fevers 

chills chest pain or shortness of breath.





- CT of abdomen (18) reviewed and reveals: Diffuse colitis with slight 

inflammatory changes.  There is also scattered diverticulosis.  No perforation.

  Mild ascites.  Also had gallstones.  Hepatic and right renal disease likely 

cyst.


- Stool positive for C. difficile patient started on Flagyl


- Supportive care





- Request physical therapy evaluation








(2) Hypokalemia


ICD Codes:  E87.6 - Hypokalemia


Status:  Resolved


Plan:  Potassium 2.6 on admission replaced recheck pending


Magnesium pending





(3) Dehydration


ICD Codes:  E86.0 - Dehydration


Plan:  BUN 32, creatinine 0.91, estimated GFR 59


Dehydration likely secondary to diarrhea


IV fluids


Supportive care


Monitor renal function





(4) Possible urinary tract infection


ICD Codes:  R39.89 - Other symptoms and signs involving the genitourinary system


Plan:  Urinalysis reviewed and reveals positive nitrates small amount of 

leukocyte esterase culture indicated and pending


Will hold off on antibiotics in light of C. difficile colitis await culture 

results to determine further treatment of UTI





(5) Anxiety


ICD Codes:  F41.9 - Anxiety disorder, unspecified


Status:  Chronic


Plan:  Continue patient's home Zoloft 150 mg by mouth daily next line 


also continue patient's home Xanax 0.25 mg by mouth 3 times a day as needed for 

anxiety





(6) Aortic valve stenosis, severe


ICD Codes:  I35.0 - Nonrheumatic aortic (valve) stenosis


Status:  Acute


Plan:  - Previous admission from - for SOB and felt to have acute CHF 

symptoms related to severe AVS. 


- 2D echocardiogram (17) which noted severe aortic valve stenosis and she 

was initially decompensated. 


- She was seen by CTS who did the initial evaluation for TAVR vs traditional 

AVR but due to her issues with GIB this has been put on hold.


- She is no longer on any anticoagulants


- Patient appears compensated at this point 





(7) CHF (congestive heart failure)


ICD Codes:  I50.9 - Heart failure, unspecified


Status:  Acute


Plan:  Patient is regularly on Lasix 20 mg by mouth daily we'll hold at this 

time secondary to dehydration


Once patient is hydrated and diarrhea is improved may need to resume





2-D echocardiogram 2017 revealed 


normal left ventricular size


Mild concentric left ventricular hypertrophy


Estimated ejection fraction 55-60%


No regional wall motion upper maladies are present


Severe aortic valve stenosis


AV mean gradient 44.0 mmHg


Mild aortic valve regurgitation


Mild/moderate mitral valve regurgitation


Mitral annular calcification is present


Severe thickening of the aortic valve leaflets


There is mild tricuspid valve regurgitation


There's estimated pulmonary hypertension present range of 40-50 mmHg


Pulmonary valve is not well visualized.





(8) Hyperlipidemia


ICD Codes:  E78.5 - Hyperlipidemia, unspecified


Status:  Chronic


Plan:  To patient's home atorvastatin 20 BY mouth daily at bedtime





Assessment and Plan


Patient examined.


Assessment and plan formulated with Lyly Chacon PA-C.


I agree with the above.





Physician Certification


2 Midnight Certification Type:  Admission for Inpatient Services


Order for Inpatient Services


The services are ordered in accordance with Medicare regulations or non-

Medicare payer requirements, as applicable.  In the case of services not 

specified as inpatient-only, they are appropriately provided as inpatient 

services in accordance with the 2-midnight benchmark.


Estimated LOS (days):  3


 days is the estimated time the patient will need to remain in the hospital, 

assuming treatment plan goals are met and no additional complications.


Post-Hospital Plan:  Not yet determined











Lyly Chacon 2018 08:54


Zia Puckett DO 2018 22:27

## 2018-01-22 VITALS
SYSTOLIC BLOOD PRESSURE: 156 MMHG | DIASTOLIC BLOOD PRESSURE: 74 MMHG | TEMPERATURE: 97.1 F | RESPIRATION RATE: 21 BRPM | OXYGEN SATURATION: 95 % | HEART RATE: 110 BPM

## 2018-01-22 VITALS
TEMPERATURE: 95.6 F | HEART RATE: 118 BPM | OXYGEN SATURATION: 96 % | SYSTOLIC BLOOD PRESSURE: 148 MMHG | RESPIRATION RATE: 20 BRPM | DIASTOLIC BLOOD PRESSURE: 68 MMHG

## 2018-01-22 VITALS
SYSTOLIC BLOOD PRESSURE: 142 MMHG | DIASTOLIC BLOOD PRESSURE: 68 MMHG | TEMPERATURE: 98.6 F | RESPIRATION RATE: 17 BRPM | OXYGEN SATURATION: 98 % | HEART RATE: 98 BPM

## 2018-01-22 VITALS
TEMPERATURE: 98 F | SYSTOLIC BLOOD PRESSURE: 157 MMHG | RESPIRATION RATE: 22 BRPM | HEART RATE: 101 BPM | OXYGEN SATURATION: 93 % | DIASTOLIC BLOOD PRESSURE: 73 MMHG

## 2018-01-22 VITALS — HEART RATE: 109 BPM

## 2018-01-22 VITALS
OXYGEN SATURATION: 94 % | SYSTOLIC BLOOD PRESSURE: 142 MMHG | HEART RATE: 114 BPM | RESPIRATION RATE: 20 BRPM | DIASTOLIC BLOOD PRESSURE: 85 MMHG | TEMPERATURE: 96.4 F

## 2018-01-22 VITALS
TEMPERATURE: 97.9 F | DIASTOLIC BLOOD PRESSURE: 79 MMHG | HEART RATE: 101 BPM | RESPIRATION RATE: 19 BRPM | OXYGEN SATURATION: 99 % | SYSTOLIC BLOOD PRESSURE: 139 MMHG

## 2018-01-22 LAB
BASOPHILS # BLD AUTO: 0 TH/MM3 (ref 0–0.2)
BASOPHILS NFR BLD: 0 % (ref 0–2)
BUN SERPL-MCNC: 19 MG/DL (ref 7–18)
CALCIUM SERPL-MCNC: 7.6 MG/DL (ref 8.5–10.1)
CHLORIDE SERPL-SCNC: 104 MEQ/L (ref 98–107)
CREAT SERPL-MCNC: 0.48 MG/DL (ref 0.5–1)
EOSINOPHIL # BLD: 0 TH/MM3 (ref 0–0.4)
EOSINOPHIL NFR BLD: 0.2 % (ref 0–4)
ERYTHROCYTE [DISTWIDTH] IN BLOOD BY AUTOMATED COUNT: 20.6 % (ref 11.6–17.2)
GFR SERPLBLD BASED ON 1.73 SQ M-ARVRAT: 123 ML/MIN (ref 89–?)
GLUCOSE SERPL-MCNC: 68 MG/DL (ref 74–106)
HCO3 BLD-SCNC: 21.4 MEQ/L (ref 21–32)
HCT VFR BLD CALC: 37.9 % (ref 35–46)
HGB BLD-MCNC: 12.5 GM/DL (ref 11.6–15.3)
LYMPHOCYTES # BLD AUTO: 0.6 TH/MM3 (ref 1–4.8)
LYMPHOCYTES NFR BLD AUTO: 3.2 % (ref 9–44)
MCH RBC QN AUTO: 28.4 PG (ref 27–34)
MCHC RBC AUTO-ENTMCNC: 33 % (ref 32–36)
MCV RBC AUTO: 86.1 FL (ref 80–100)
MONOCYTE #: 1.3 TH/MM3 (ref 0–0.9)
MONOCYTES NFR BLD: 7 % (ref 0–8)
NEUTROPHILS # BLD AUTO: 16.9 TH/MM3 (ref 1.8–7.7)
NEUTROPHILS NFR BLD AUTO: 89.6 % (ref 16–70)
PLATELET # BLD: 362 TH/MM3 (ref 150–450)
PMV BLD AUTO: 9 FL (ref 7–11)
RBC # BLD AUTO: 4.4 MIL/MM3 (ref 4–5.3)
SODIUM SERPL-SCNC: 136 MEQ/L (ref 136–145)
WBC # BLD AUTO: 18.8 TH/MM3 (ref 4–11)

## 2018-01-22 RX ADMIN — PHENYTOIN SODIUM SCH MLS/HR: 50 INJECTION INTRAMUSCULAR; INTRAVENOUS at 06:29

## 2018-01-22 RX ADMIN — ONDANSETRON PRN MLS/HR: 2 INJECTION, SOLUTION INTRAMUSCULAR; INTRAVENOUS at 15:58

## 2018-01-22 RX ADMIN — ACETAMINOPHEN PRN MG: 325 TABLET ORAL at 22:17

## 2018-01-22 RX ADMIN — Medication SCH MG: at 08:45

## 2018-01-22 RX ADMIN — SERTRALINE HYDROCHLORIDE SCH MG: 50 TABLET, FILM COATED ORAL at 08:44

## 2018-01-22 RX ADMIN — ACETAMINOPHEN PRN MG: 325 TABLET ORAL at 06:04

## 2018-01-22 RX ADMIN — POTASSIUM CHLORIDE SCH MEQ: 20 TABLET, EXTENDED RELEASE ORAL at 08:45

## 2018-01-22 RX ADMIN — VITAMIN C SCH TAB: TAB at 08:45

## 2018-01-22 RX ADMIN — Medication SCH ML: at 22:20

## 2018-01-22 RX ADMIN — Medication SCH ML: at 08:44

## 2018-01-22 RX ADMIN — ACETAMINOPHEN PRN MG: 325 TABLET ORAL at 13:02

## 2018-01-22 RX ADMIN — Medication SCH TAB: at 08:44

## 2018-01-22 RX ADMIN — CETIRIZINE HYDROCHLORIDE SCH MG: 10 TABLET, FILM COATED ORAL at 08:45

## 2018-01-22 RX ADMIN — PANTOPRAZOLE SCH MG: 40 TABLET, DELAYED RELEASE ORAL at 08:44

## 2018-01-22 RX ADMIN — ATORVASTATIN CALCIUM SCH MG: 20 TABLET, FILM COATED ORAL at 22:13

## 2018-01-22 NOTE — HHI.PR
Subjective


Remarks


Patient reports feeling, "extremity tired."


Patient was up often through the night passing stool


She has had approximately 6 BMs in the past 24 hours 


Reports that the stool is becoming more formed





Objective


Vitals





Vital Signs








  Date Time  Temp Pulse Resp B/P (MAP) Pulse Ox O2 Delivery O2 Flow Rate FiO2


 


1/22/18 04:00 98.6 98 17 142/68 (92) 98   


 


1/22/18 00:00 97.9 101 19 139/79 (99) 99   


 


1/21/18 20:00 96.4 109 18 136/78 (97) 91   


 


1/21/18 16:00 98.0 113 16 140/82 (101) 92   


 


1/21/18 12:00 96.0 105 17 138/79 (98) 93   








Result Diagram:  


1/22/18 0610                                                                   

             1/21/18 1128





Other Results





 Laboratory Tests








Test


  1/20/18


18:47 1/20/18


21:45 1/21/18


01:25 1/21/18


02:00


 


White Blood Count 22.9 TH/MM3    


 


Red Blood Count 4.49 MIL/MM3    


 


Hemoglobin 13.0 GM/DL    


 


Hematocrit 39.0 %    


 


Mean Corpuscular Volume 86.8 FL    


 


Mean Corpuscular Hemoglobin 29.0 PG    


 


Mean Corpuscular Hemoglobin


Concent 33.3 % 


  


  


  


 


 


Red Cell Distribution Width 20.4 %    


 


Platelet Count 424 TH/MM3    


 


Mean Platelet Volume 8.6 FL    


 


Neutrophils (%) (Auto) 88.2 %    


 


Lymphocytes (%) (Auto) 1.8 %    


 


Monocytes (%) (Auto) 9.9 %    


 


Eosinophils (%) (Auto) 0.0 %    


 


Basophils (%) (Auto) 0.1 %    


 


Neutrophils # (Auto) 20.2 TH/MM3    


 


Lymphocytes # (Auto) 0.4 TH/MM3    


 


Monocytes # (Auto) 2.3 TH/MM3    


 


Eosinophils # (Auto) 0.0 TH/MM3    


 


Basophils # (Auto) 0.0 TH/MM3    


 


CBC Comment AUTO DIFF    


 


Differential Total Cells


Counted 100 


  


  


  


 


 


Neutrophils % (Manual) 73 %    


 


Band Neutrophils % 20 %    


 


Lymphocytes % 1 %    


 


Monocytes % 5 %    


 


Neutrophils # (Manual) 21.5 TH/MM3    


 


Myelocytes 1 %    


 


Differential Comment


  FINAL DIFF


MANUAL 


  


  


 


 


Platelet Estimate HIGH    


 


Platelet Morphology Comment NORMAL    


 


Ovalocytes 1+    


 


Keratocytes 1+    


 


Prothrombin Time 11.0 SEC    


 


Prothromb Time International


Ratio 1.1 RATIO 


  


  


  


 


 


Activated Partial


Thromboplast Time 20.9 SEC 


  


  


  


 


 


Blood Urea Nitrogen 32 MG/DL    


 


Creatinine 0.91 MG/DL    


 


Random Glucose 149 MG/DL    


 


Total Protein 6.3 GM/DL    


 


Albumin 2.5 GM/DL    


 


Calcium Level 8.4 MG/DL    


 


Alkaline Phosphatase 146 U/L    


 


Aspartate Amino Transf


(AST/SGOT) 21 U/L 


  


  


  


 


 


Alanine Aminotransferase


(ALT/SGPT) 17 U/L 


  


  


  


 


 


Total Bilirubin 0.5 MG/DL    


 


Sodium Level 133 MEQ/L    


 


Potassium Level 2.6 MEQ/L    


 


Chloride Level 95 MEQ/L    


 


Carbon Dioxide Level 26.1 MEQ/L    


 


Anion Gap 12 MEQ/L    


 


Estimat Glomerular Filtration


Rate 59 ML/MIN 


  


  


  


 


 


Lactic Acid Level 2.8 mmol/L   1.1 mmol/L  


 


Stool C. difficile Toxin (PCR)  POSITIVE   


 


Stl C. difficile Toxin


Epiderm 027 


  PRESUMPTIVE


NEGATIVE 


  


 


 


Urine Color    YELLOW 


 


Urine Turbidity    CLEAR 


 


Urine pH    6.5 


 


Urine Specific Gravity


  


  


  


  GREATER THAN


1.050


 


Urine Protein    30 mg/dL 


 


Urine Glucose (UA)    NEG mg/dL 


 


Urine Ketones    NEG mg/dL 


 


Urine Occult Blood    NEG 


 


Urine Nitrite    POS 


 


Urine Bilirubin    NEG 


 


Urine Urobilinogen    2.0 MG/DL 


 


Urine Leukocyte Esterase    SMALL 


 


Urine RBC    1 /hpf 


 


Urine WBC    8 /hpf 


 


Urine Squamous Epithelial


Cells 


  


  


  1 /hpf 


 


 


Urine Amorphous Sediment    RARE 


 


Urine Bacteria    MANY /hpf 


 


Microscopic Urinalysis Comment


  


  


  


  CULTURE


INDICATED


 


Test


  1/21/18


11:28 1/22/18


06:10 


  


 


 


White Blood Count 27.8 TH/MM3  18.8 TH/MM3   


 


Red Blood Count 4.30 MIL/MM3  4.40 MIL/MM3   


 


Hemoglobin 12.0 GM/DL  12.5 GM/DL   


 


Hematocrit 37.1 %  37.9 %   


 


Mean Corpuscular Volume 86.2 FL  86.1 FL   


 


Mean Corpuscular Hemoglobin 28.0 PG  28.4 PG   


 


Mean Corpuscular Hemoglobin


Concent 32.4 % 


  33.0 % 


  


  


 


 


Red Cell Distribution Width 20.7 %  20.6 %   


 


Platelet Count 398 TH/MM3  362 TH/MM3   


 


Mean Platelet Volume 8.5 FL  9.0 FL   


 


Neutrophils (%) (Auto) 91.5 %  89.6 %   


 


Lymphocytes (%) (Auto) 2.2 %  3.2 %   


 


Monocytes (%) (Auto) 6.1 %  7.0 %   


 


Eosinophils (%) (Auto) 0.1 %  0.2 %   


 


Basophils (%) (Auto) 0.1 %  0.0 %   


 


Neutrophils # (Auto) 25.5 TH/MM3  16.9 TH/MM3   


 


Lymphocytes # (Auto) 0.6 TH/MM3  0.6 TH/MM3   


 


Monocytes # (Auto) 1.7 TH/MM3  1.3 TH/MM3   


 


Eosinophils # (Auto) 0.0 TH/MM3  0.0 TH/MM3   


 


Basophils # (Auto) 0.0 TH/MM3  0.0 TH/MM3   


 


CBC Comment AUTO DIFF  DIFF FINAL   


 


Differential Total Cells


Counted 100 


  


  


  


 


 


Neutrophils % (Manual) 82 %    


 


Band Neutrophils % 13 %    


 


Monocytes % 5 %    


 


Neutrophils # (Manual) 26.4 TH/MM3    


 


Differential Comment


  FINAL DIFF


MANUAL  


  


  


 


 


Platelet Estimate NORMAL    


 


Platelet Morphology Comment NORMAL    


 


Ovalocytes 1+    


 


Blood Urea Nitrogen 25 MG/DL    


 


Creatinine 0.63 MG/DL    


 


Random Glucose 143 MG/DL    


 


Calcium Level 8.0 MG/DL    


 


Magnesium Level 2.5 MG/DL    


 


Sodium Level 135 MEQ/L    


 


Potassium Level 4.8 MEQ/L    


 


Chloride Level 103 MEQ/L    


 


Carbon Dioxide Level 26.9 MEQ/L    


 


Anion Gap 5 MEQ/L    


 


Estimat Glomerular Filtration


Rate 90 ML/MIN 


  


  


  


 








Imaging





Last Impressions








Chest X-Ray 1/20/18 2118 Signed





Impressions: 





 Service Date/Time:  Saturday, January 20, 2018 21:52 - CONCLUSION:  1. Left 





 basilar scarring. 2. No pneumonia.     Fritz Doyle MD 


 


Abdomen/Pelvis CT 1/20/18 1840 Signed





Impressions: 





 Service Date/Time:  Saturday, January 20, 2018 20:06 - CONCLUSION:  1. Diffuse 





 colitis with slight inflammatory changes. There is also scattered 





 diverticulosis. No perforation. 2. Minimal ascites. 3. Calcified gallstones. 

4. 





 Hepatic and right renal densities likely cysts     Fritz Doyle MD 








Objective Remarks


GENERAL: This is an 85 year old female patient, well-developed patient, appears 

fatigued


CARDIOVASCULAR: Regular rate and rhythm with murmur present


RESPIRATORY: Clear to auscultation. Breath sounds equal bilaterally. 


GASTROINTESTINAL: Abdomen soft, nontender, mildly distended. normoactive bowel 

sounds x 4 quadrats 


MUSCULOSKELETAL: Extremities without clubbing, cyanosis, or edema. No joint 

tenderness, effusion, or edema noted. No calf tenderness. Negative Homans sign 

bilaterally.


NEUROLOGICAL: Awake and alert. No focal deficits noted.  Motor and sensory 

grossly within normal limits. 3-4 out of 5 muscle strength in all muscle 

groups.  Normal speech.


Procedures


none





A/P


Problem List:  


(1) C. difficile colitis


ICD Codes:  A04.72 - Enterocolitis due to Clostridium difficile, not specified 

as recurrent


Plan:  Patient presents to the emergency department with complaints of multiple 

episodes of diarrhea for the past 4 days associated with left sided abdominal 

pain.  Patient describes the pain as a intermittent cramping sensation moderate 

in severity.  Patient denies recent antibiotic use or evidence of bleeding in 

the stool.  Patient does endorse nausea but no vomiting.  Patient denies fevers 

chills chest pain or shortness of breath.





- CT of abdomen (1/20/18) reviewed and reveals: Diffuse colitis with slight 

inflammatory changes.  There is also scattered diverticulosis.  No perforation.

  Mild ascites.  Also had gallstones.  Hepatic and right renal disease likely 

cyst.


- Stool positive for C. difficile patient started on Flagyl


- Supportive care





- Physical therapy recommends rehab at time of DC








(2) Hypokalemia


ICD Codes:  E87.6 - Hypokalemia


Status:  Resolved


Plan:  Potassium 2.6 on admission -> 4.8 (1/21) -> pending (1/22)


Magnesium 2.5 (1/21)





(3) Dehydration


ICD Codes:  E86.0 - Dehydration


Plan:  BUN 32, creatinine 0.91, estimated GFR 59 on admission


Dehydration likely secondary to diarrhea


IV fluids- patient has CHF monitor for ss of fluid overload


CXR requested (1/22) pending


Supportive care


Monitor renal function





(4) Possible urinary tract infection


ICD Codes:  R39.89 - Other symptoms and signs involving the genitourinary system


Plan:  Urinalysis reviewed and reveals positive nitrates small amount of 

leukocyte esterase culture indicated and pending


Will hold off on antibiotics in light of C. difficile colitis await culture 

results to determine further treatment of UTI





(5) Anxiety


ICD Codes:  F41.9 - Anxiety disorder, unspecified


Status:  Chronic


Plan:  Continue patient's home Zoloft 150 mg by mouth daily next line 


also continue patient's home Xanax 0.25 mg by mouth 3 times a day as needed for 

anxiety





(6) Aortic valve stenosis, severe


ICD Codes:  I35.0 - Nonrheumatic aortic (valve) stenosis


Status:  Acute


Plan:  - Previous admission from 8/18-8/21 for SOB and felt to have acute CHF 

symptoms related to severe AVS. 


- 2D echocardiogram (8/19/17) which noted severe aortic valve stenosis and she 

was initially decompensated. 


- She was seen by CTS who did the initial evaluation for TAVR vs traditional 

AVR but due to her issues with GIB this has been put on hold.


- She is no longer on any anticoagulants


- Patient appears compensated at this point 





(7) CHF (congestive heart failure)


ICD Codes:  I50.9 - Heart failure, unspecified


Status:  Acute


Plan:  Patient is regularly on Lasix 20 mg by mouth daily we'll hold at this 

time secondary to dehydration


Once patient is hydrated and diarrhea is improved may need to resume





2-D echocardiogram 8/19/2017 revealed 


normal left ventricular size


Mild concentric left ventricular hypertrophy


Estimated ejection fraction 55-60%


No regional wall motion upper maladies are present


Severe aortic valve stenosis


AV mean gradient 44.0 mmHg


Mild aortic valve regurgitation


Mild/moderate mitral valve regurgitation


Mitral annular calcification is present


Severe thickening of the aortic valve leaflets


There is mild tricuspid valve regurgitation


There's estimated pulmonary hypertension present range of 40-50 mmHg


Pulmonary valve is not well visualized.





(8) Hyperlipidemia


ICD Codes:  E78.5 - Hyperlipidemia, unspecified


Status:  Chronic


Plan:  To patient's home atorvastatin 20 BY mouth daily at bedtime





Assessment and Plan


Patient examined.


Assessment and plan formulated with Lyly Chacon PA-C.


I agree with the above.


'c.diff colitis


gnr positive u/a...denies uti sx's. monitor. recheck. rx if necessary.











Lyly Chacon Jan 22, 2018 08:46


Jaxson Rose MD Jan 22, 2018 17:14

## 2018-01-22 NOTE — RADRPT
EXAM DATE/TIME:  01/22/2018 09:33 

 

[facility] COMPARISON:     

CHEST SINGLE AP, November 07, 2017, 12:06.  CHEST SINGLE AP, January 20, 2018, 21:52.

 

                     

INDICATIONS :     

Short of breath, evaluate congestive heart failure

                     

 

MEDICAL HISTORY :     

Cardiovascular disease.  Congestive heart failure.  Hypertension.      

 

SURGICAL HISTORY :     

Hysterectomy.   

 

ENCOUNTER:     

Subsequent                                        

 

ACUITY:     

2 days      

 

PAIN SCORE:     

0/10

 

LOCATION:     

Bilateral chest 

 

FINDINGS:     

Slightly progressed left lower lobe pleural-parenchymal disease. Cardiomediastinal contours are stabl
e. Bony thorax is intact.

 

CONCLUSION:     

1. Slightly progressed left lower lobe airspace disease and associated trace pleural effusion.

 

 

 

 Anupam Sierra MD on January 22, 2018 at 9:58           

Board Certified Radiologist.

 This report was verified electronically.

## 2018-01-23 VITALS
SYSTOLIC BLOOD PRESSURE: 127 MMHG | RESPIRATION RATE: 18 BRPM | HEART RATE: 107 BPM | OXYGEN SATURATION: 97 % | TEMPERATURE: 97.2 F | DIASTOLIC BLOOD PRESSURE: 58 MMHG

## 2018-01-23 VITALS
HEART RATE: 107 BPM | RESPIRATION RATE: 18 BRPM | SYSTOLIC BLOOD PRESSURE: 124 MMHG | OXYGEN SATURATION: 96 % | DIASTOLIC BLOOD PRESSURE: 58 MMHG | TEMPERATURE: 96.5 F

## 2018-01-23 VITALS — HEART RATE: 107 BPM

## 2018-01-23 VITALS
HEART RATE: 103 BPM | TEMPERATURE: 97.6 F | DIASTOLIC BLOOD PRESSURE: 87 MMHG | SYSTOLIC BLOOD PRESSURE: 171 MMHG | OXYGEN SATURATION: 95 % | RESPIRATION RATE: 17 BRPM

## 2018-01-23 VITALS
OXYGEN SATURATION: 94 % | SYSTOLIC BLOOD PRESSURE: 131 MMHG | RESPIRATION RATE: 18 BRPM | DIASTOLIC BLOOD PRESSURE: 77 MMHG | HEART RATE: 111 BPM | TEMPERATURE: 96.7 F

## 2018-01-23 VITALS
SYSTOLIC BLOOD PRESSURE: 136 MMHG | HEART RATE: 112 BPM | DIASTOLIC BLOOD PRESSURE: 83 MMHG | OXYGEN SATURATION: 94 % | TEMPERATURE: 97 F | RESPIRATION RATE: 20 BRPM

## 2018-01-23 VITALS
SYSTOLIC BLOOD PRESSURE: 119 MMHG | OXYGEN SATURATION: 95 % | RESPIRATION RATE: 18 BRPM | TEMPERATURE: 97.4 F | DIASTOLIC BLOOD PRESSURE: 69 MMHG | HEART RATE: 102 BPM

## 2018-01-23 VITALS — HEART RATE: 110 BPM

## 2018-01-23 VITALS — HEART RATE: 108 BPM

## 2018-01-23 LAB
BACTERIA #/AREA URNS HPF: (no result) /HPF
COLOR UR: (no result)
GLUCOSE UR STRIP-MCNC: (no result) MG/DL
HGB UR QL STRIP: (no result)
HYALINE CASTS #/AREA URNS LPF: 4 /LPF
KETONES UR STRIP-MCNC: 10 MG/DL
MUCOUS THREADS #/AREA URNS LPF: (no result) /LPF
NITRITE UR QL STRIP: (no result)
SP GR UR STRIP: 1.03 (ref 1–1.03)
SQUAMOUS #/AREA URNS HPF: 1 /HPF (ref 0–5)
TRANS CELLS #/AREA URNS HPF: 1 /HPF
URINE LEUKOCYTE ESTERASE: (no result)

## 2018-01-23 RX ADMIN — PHENYTOIN SODIUM SCH MLS/HR: 50 INJECTION INTRAMUSCULAR; INTRAVENOUS at 05:30

## 2018-01-23 RX ADMIN — PANTOPRAZOLE SCH MG: 40 TABLET, DELAYED RELEASE ORAL at 10:10

## 2018-01-23 RX ADMIN — ACETAMINOPHEN PRN MG: 325 TABLET ORAL at 04:25

## 2018-01-23 RX ADMIN — ACETAMINOPHEN PRN MG: 325 TABLET ORAL at 20:52

## 2018-01-23 RX ADMIN — Medication SCH ML: at 10:12

## 2018-01-23 RX ADMIN — Medication SCH TAB: at 10:10

## 2018-01-23 RX ADMIN — POTASSIUM CHLORIDE SCH MEQ: 20 TABLET, EXTENDED RELEASE ORAL at 10:11

## 2018-01-23 RX ADMIN — VITAMIN C SCH TAB: TAB at 10:11

## 2018-01-23 RX ADMIN — CETIRIZINE HYDROCHLORIDE SCH MG: 10 TABLET, FILM COATED ORAL at 10:11

## 2018-01-23 RX ADMIN — Medication SCH MG: at 10:11

## 2018-01-23 RX ADMIN — ONDANSETRON PRN MG: 4 TABLET, ORALLY DISINTEGRATING ORAL at 10:22

## 2018-01-23 RX ADMIN — SODIUM CHLORIDE SCH MLS/HR: 900 INJECTION INTRAVENOUS at 15:21

## 2018-01-23 RX ADMIN — SERTRALINE HYDROCHLORIDE SCH MG: 50 TABLET, FILM COATED ORAL at 10:11

## 2018-01-23 RX ADMIN — ONDANSETRON PRN MG: 4 TABLET, ORALLY DISINTEGRATING ORAL at 16:51

## 2018-01-23 RX ADMIN — Medication SCH ML: at 20:57

## 2018-01-23 RX ADMIN — ATORVASTATIN CALCIUM SCH MG: 20 TABLET, FILM COATED ORAL at 20:52

## 2018-01-23 NOTE — HHI.PR
Subjective


Remarks


Patient continues to report nausea


Lost IV site through the night


reports one one BM through the night


telemetry reveals ST 1 teens





Objective


Vitals





Vital Signs








  Date Time  Temp Pulse Resp B/P (MAP) Pulse Ox O2 Delivery O2 Flow Rate FiO2


 


1/23/18 04:00 97.0 112 20 136/83 (100) 94   


 


1/23/18 00:00 97.4 102 18 119/69 (86) 95   


 


1/22/18 23:40  109      


 


1/22/18 20:00 96.4 114 20 142/85 (104) 94   


 


1/22/18 16:00 97.1 110 21 156/74 (101) 95   


 


1/22/18 12:00 95.6 118 20 148/68 (94) 96   








Result Diagram:  


1/22/18 0610                                                                   

             1/22/18 0610





Other Results





 Laboratory Tests








Test


  1/20/18


18:47 1/20/18


21:45 1/21/18


01:25 1/21/18


02:00


 


White Blood Count 22.9 TH/MM3    


 


Red Blood Count 4.49 MIL/MM3    


 


Hemoglobin 13.0 GM/DL    


 


Hematocrit 39.0 %    


 


Mean Corpuscular Volume 86.8 FL    


 


Mean Corpuscular Hemoglobin 29.0 PG    


 


Mean Corpuscular Hemoglobin


Concent 33.3 % 


  


  


  


 


 


Red Cell Distribution Width 20.4 %    


 


Platelet Count 424 TH/MM3    


 


Mean Platelet Volume 8.6 FL    


 


Neutrophils (%) (Auto) 88.2 %    


 


Lymphocytes (%) (Auto) 1.8 %    


 


Monocytes (%) (Auto) 9.9 %    


 


Eosinophils (%) (Auto) 0.0 %    


 


Basophils (%) (Auto) 0.1 %    


 


Neutrophils # (Auto) 20.2 TH/MM3    


 


Lymphocytes # (Auto) 0.4 TH/MM3    


 


Monocytes # (Auto) 2.3 TH/MM3    


 


Eosinophils # (Auto) 0.0 TH/MM3    


 


Basophils # (Auto) 0.0 TH/MM3    


 


CBC Comment AUTO DIFF    


 


Differential Total Cells


Counted 100 


  


  


  


 


 


Neutrophils % (Manual) 73 %    


 


Band Neutrophils % 20 %    


 


Lymphocytes % 1 %    


 


Monocytes % 5 %    


 


Neutrophils # (Manual) 21.5 TH/MM3    


 


Myelocytes 1 %    


 


Differential Comment


  FINAL DIFF


MANUAL 


  


  


 


 


Platelet Estimate HIGH    


 


Platelet Morphology Comment NORMAL    


 


Ovalocytes 1+    


 


Keratocytes 1+    


 


Prothrombin Time 11.0 SEC    


 


Prothromb Time International


Ratio 1.1 RATIO 


  


  


  


 


 


Activated Partial


Thromboplast Time 20.9 SEC 


  


  


  


 


 


Blood Urea Nitrogen 32 MG/DL    


 


Creatinine 0.91 MG/DL    


 


Random Glucose 149 MG/DL    


 


Total Protein 6.3 GM/DL    


 


Albumin 2.5 GM/DL    


 


Calcium Level 8.4 MG/DL    


 


Alkaline Phosphatase 146 U/L    


 


Aspartate Amino Transf


(AST/SGOT) 21 U/L 


  


  


  


 


 


Alanine Aminotransferase


(ALT/SGPT) 17 U/L 


  


  


  


 


 


Total Bilirubin 0.5 MG/DL    


 


Sodium Level 133 MEQ/L    


 


Potassium Level 2.6 MEQ/L    


 


Chloride Level 95 MEQ/L    


 


Carbon Dioxide Level 26.1 MEQ/L    


 


Anion Gap 12 MEQ/L    


 


Estimat Glomerular Filtration


Rate 59 ML/MIN 


  


  


  


 


 


Lactic Acid Level 2.8 mmol/L   1.1 mmol/L  


 


Stool C. difficile Toxin (PCR)  POSITIVE   


 


Stl C. difficile Toxin


Epiderm 027 


  PRESUMPTIVE


NEGATIVE 


  


 


 


Urine Color    YELLOW 


 


Urine Turbidity    CLEAR 


 


Urine pH    6.5 


 


Urine Specific Gravity


  


  


  


  GREATER THAN


1.050


 


Urine Protein    30 mg/dL 


 


Urine Glucose (UA)    NEG mg/dL 


 


Urine Ketones    NEG mg/dL 


 


Urine Occult Blood    NEG 


 


Urine Nitrite    POS 


 


Urine Bilirubin    NEG 


 


Urine Urobilinogen    2.0 MG/DL 


 


Urine Leukocyte Esterase    SMALL 


 


Urine RBC    1 /hpf 


 


Urine WBC    8 /hpf 


 


Urine Squamous Epithelial


Cells 


  


  


  1 /hpf 


 


 


Urine Amorphous Sediment    RARE 


 


Urine Bacteria    MANY /hpf 


 


Microscopic Urinalysis Comment


  


  


  


  CULTURE


INDICATED


 


Test


  1/21/18


11:28 1/22/18


06:10 1/23/18


04:20 


 


 


White Blood Count 27.8 TH/MM3  18.8 TH/MM3   


 


Red Blood Count 4.30 MIL/MM3  4.40 MIL/MM3   


 


Hemoglobin 12.0 GM/DL  12.5 GM/DL   


 


Hematocrit 37.1 %  37.9 %   


 


Mean Corpuscular Volume 86.2 FL  86.1 FL   


 


Mean Corpuscular Hemoglobin 28.0 PG  28.4 PG   


 


Mean Corpuscular Hemoglobin


Concent 32.4 % 


  33.0 % 


  


  


 


 


Red Cell Distribution Width 20.7 %  20.6 %   


 


Platelet Count 398 TH/MM3  362 TH/MM3   


 


Mean Platelet Volume 8.5 FL  9.0 FL   


 


Neutrophils (%) (Auto) 91.5 %  89.6 %   


 


Lymphocytes (%) (Auto) 2.2 %  3.2 %   


 


Monocytes (%) (Auto) 6.1 %  7.0 %   


 


Eosinophils (%) (Auto) 0.1 %  0.2 %   


 


Basophils (%) (Auto) 0.1 %  0.0 %   


 


Neutrophils # (Auto) 25.5 TH/MM3  16.9 TH/MM3   


 


Lymphocytes # (Auto) 0.6 TH/MM3  0.6 TH/MM3   


 


Monocytes # (Auto) 1.7 TH/MM3  1.3 TH/MM3   


 


Eosinophils # (Auto) 0.0 TH/MM3  0.0 TH/MM3   


 


Basophils # (Auto) 0.0 TH/MM3  0.0 TH/MM3   


 


CBC Comment AUTO DIFF  DIFF FINAL   


 


Differential Total Cells


Counted 100 


  


  


  


 


 


Neutrophils % (Manual) 82 %    


 


Band Neutrophils % 13 %    


 


Monocytes % 5 %    


 


Neutrophils # (Manual) 26.4 TH/MM3    


 


Differential Comment


  FINAL DIFF


MANUAL  


  


  


 


 


Platelet Estimate NORMAL    


 


Platelet Morphology Comment NORMAL    


 


Ovalocytes 1+    


 


Blood Urea Nitrogen 25 MG/DL  19 MG/DL   


 


Creatinine 0.63 MG/DL  0.48 MG/DL   


 


Random Glucose 143 MG/DL  68 MG/DL   


 


Calcium Level 8.0 MG/DL  7.6 MG/DL   


 


Magnesium Level 2.5 MG/DL    


 


Sodium Level 135 MEQ/L  136 MEQ/L   


 


Potassium Level 4.8 MEQ/L  4.1 MEQ/L   


 


Chloride Level 103 MEQ/L  104 MEQ/L   


 


Carbon Dioxide Level 26.9 MEQ/L  21.4 MEQ/L   


 


Anion Gap 5 MEQ/L  11 MEQ/L   


 


Estimat Glomerular Filtration


Rate 90 ML/MIN 


  123 ML/MIN 


  


  


 


 


Urine Color   DARK-BROWN  


 


Urine Turbidity   CLEAR  


 


Urine pH   6.0  


 


Urine Specific Gravity   1.030  


 


Urine Protein   30 mg/dL  


 


Urine Glucose (UA)   NEG mg/dL  


 


Urine Ketones   10 mg/dL  


 


Urine Occult Blood   NEG  


 


Urine Nitrite   NEG  


 


Urine Bilirubin   NEG  


 


Urine Urobilinogen   2.0 MG/DL  


 


Urine Leukocyte Esterase   MOD  


 


Urine RBC   1 /hpf  


 


Urine WBC   6 /hpf  


 


Urine Squamous Epithelial


Cells 


  


  1 /hpf 


  


 


 


Urine Transitional Epithelial


Cells 


  


  1 /hpf 


  


 


 


Urine Bacteria   RARE /hpf  


 


Urine Hyaline Casts   4 /lpf  


 


Urine Mucus   FEW /lpf  


 


Microscopic Urinalysis Comment


  


  


  CATH-CULTURE


IND 


 








Imaging





Last Impressions








Chest X-Ray 1/20/18 2118 Signed





Impressions: 





 Service Date/Time:  Saturday, January 20, 2018 21:52 - CONCLUSION:  1. Left 





 basilar scarring. 2. No pneumonia.     Fritz Doyle MD 


 


Abdomen/Pelvis CT 1/20/18 1840 Signed





Impressions: 





 Service Date/Time:  Saturday, January 20, 2018 20:06 - CONCLUSION:  1. Diffuse 





 colitis with slight inflammatory changes. There is also scattered 





 diverticulosis. No perforation. 2. Minimal ascites. 3. Calcified gallstones. 

4. 





 Hepatic and right renal densities likely cysts     Fritz Doyle MD 








Objective Remarks


GENERAL: This is an 85 year old female patient, well-developed patient, appears 

fatigued


CARDIOVASCULAR: tachycardic


RESPIRATORY: Clear to auscultation. Breath sounds equal bilaterally. 


GASTROINTESTINAL: Abdomen soft, nontender, mildly distended. normoactive bowel 

sounds x 4 quadrats 


MUSCULOSKELETAL: Extremities without clubbing, cyanosis, or edema. No joint 

tenderness, effusion, or edema noted. No calf tenderness. Negative Homans sign 

bilaterally.


NEUROLOGICAL: Awake and alert. No focal deficits noted.  Motor and sensory 

grossly within normal limits. 3-4 out of 5 muscle strength in all muscle 

groups.  Normal speech.


Procedures


none





A/P


Problem List:  


(1) C. difficile colitis


ICD Codes:  A04.72 - Enterocolitis due to Clostridium difficile, not specified 

as recurrent


Plan:  Patient presents to the emergency department with complaints of multiple 

episodes of diarrhea for the past 4 days associated with left sided abdominal 

pain.  Patient describes the pain as a intermittent cramping sensation moderate 

in severity.  Patient denies recent antibiotic use or evidence of bleeding in 

the stool.  Patient does endorse nausea but no vomiting.  Patient denies fevers 

chills chest pain or shortness of breath.





- CT of abdomen (1/20/18) reviewed and reveals: Diffuse colitis with slight 

inflammatory changes.  There is also scattered diverticulosis.  No perforation.

  Mild ascites.  Also had gallstones.  Hepatic and right renal disease likely 

cyst.


- Stool positive for C. difficile patient started on Flagyl


- Supportive care





- Physical therapy recommends rehab at time of DC








(2) Hypokalemia


ICD Codes:  E87.6 - Hypokalemia


Status:  Resolved


Plan:  Potassium 2.6 on admission -> 4.8 (1/21) -> 4.1 (1/22)


Magnesium 2.5 (1/21)





(3) Dehydration


ICD Codes:  E86.0 - Dehydration


Plan:  BUN 32, creatinine 0.91, estimated GFR 59 on admission


Dehydration likely secondary to diarrhea


IV fluids- patient has CHF monitor for ss of fluid overload


CXR requested (1/22) pending


Supportive care


Monitor renal function





1/23 telemetry reveals ST in the 1 teens


likely secondary to dehydration


Patient lost IV through the night and RN unable to place IV


Request IV placed with US, then 500 ml NS at 100 ml/hour followed by 50ml per 

hour


continue to monitor telemetry





(4) Possible urinary tract infection


ICD Codes:  R39.89 - Other symptoms and signs involving the genitourinary system


Plan:  Urinalysis reviewed and reveals positive nitrates small amount of 

leukocyte esterase culture indicated and pending


Will hold off on antibiotics in light of C. difficile colitis await culture 

results to determine further treatment of UTI





Urine culture reveals: Citrobacter Amalonaticus


sensitive to Rocephin.  Will start Rocephin IV





(5) Anxiety


ICD Codes:  F41.9 - Anxiety disorder, unspecified


Status:  Chronic


Plan:  Continue patient's home Zoloft 150 mg by mouth daily next line 


also continue patient's home Xanax 0.25 mg by mouth 3 times a day as needed for 

anxiety





(6) Aortic valve stenosis, severe


ICD Codes:  I35.0 - Nonrheumatic aortic (valve) stenosis


Status:  Acute


Plan:  - Previous admission from 8/18-8/21 for SOB and felt to have acute CHF 

symptoms related to severe AVS. 


- 2D echocardiogram (8/19/17) which noted severe aortic valve stenosis and she 

was initially decompensated. 


- She was seen by CTS who did the initial evaluation for TAVR vs traditional 

AVR but due to her issues with GIB this has been put on hold.


- She is no longer on any anticoagulants


- Patient appears compensated at this point 





(7) CHF (congestive heart failure)


ICD Codes:  I50.9 - Heart failure, unspecified


Status:  Acute


Plan:  Patient is regularly on Lasix 20 mg by mouth daily we'll hold at this 

time secondary to dehydration


Once patient is hydrated and diarrhea is improved may need to resume





2-D echocardiogram 8/19/2017 revealed 


normal left ventricular size


Mild concentric left ventricular hypertrophy


Estimated ejection fraction 55-60%


No regional wall motion upper maladies are present


Severe aortic valve stenosis


AV mean gradient 44.0 mmHg


Mild aortic valve regurgitation


Mild/moderate mitral valve regurgitation


Mitral annular calcification is present


Severe thickening of the aortic valve leaflets


There is mild tricuspid valve regurgitation


There's estimated pulmonary hypertension present range of 40-50 mmHg


Pulmonary valve is not well visualized.





(8) Hyperlipidemia


ICD Codes:  E78.5 - Hyperlipidemia, unspecified


Status:  Chronic


Plan:  To patient's home atorvastatin 20 BY mouth daily at bedtime





Assessment and Plan


Patient examined.


Assessment and plan formulated with Lyly Chacon PA-C.


I agree with the above.


c.diff colitis


uti.


feels alot of nausea and weakness


poor po intake.


gentle ivf given AVS


prn zofran.


needs picc.











Lyly Chacon Jan 23, 2018 09:12


Jaxson Rose MD Jan 23, 2018 13:33

## 2018-01-24 VITALS
SYSTOLIC BLOOD PRESSURE: 117 MMHG | RESPIRATION RATE: 18 BRPM | HEART RATE: 110 BPM | OXYGEN SATURATION: 92 % | TEMPERATURE: 96.7 F | DIASTOLIC BLOOD PRESSURE: 75 MMHG

## 2018-01-24 VITALS
SYSTOLIC BLOOD PRESSURE: 139 MMHG | TEMPERATURE: 96.7 F | HEART RATE: 110 BPM | OXYGEN SATURATION: 93 % | RESPIRATION RATE: 20 BRPM | DIASTOLIC BLOOD PRESSURE: 70 MMHG

## 2018-01-24 VITALS
OXYGEN SATURATION: 92 % | DIASTOLIC BLOOD PRESSURE: 75 MMHG | RESPIRATION RATE: 18 BRPM | TEMPERATURE: 97.1 F | SYSTOLIC BLOOD PRESSURE: 135 MMHG | HEART RATE: 100 BPM

## 2018-01-24 VITALS
OXYGEN SATURATION: 91 % | RESPIRATION RATE: 18 BRPM | TEMPERATURE: 96.1 F | HEART RATE: 111 BPM | SYSTOLIC BLOOD PRESSURE: 136 MMHG | DIASTOLIC BLOOD PRESSURE: 73 MMHG

## 2018-01-24 VITALS
DIASTOLIC BLOOD PRESSURE: 61 MMHG | OXYGEN SATURATION: 92 % | RESPIRATION RATE: 22 BRPM | TEMPERATURE: 98.8 F | HEART RATE: 124 BPM | SYSTOLIC BLOOD PRESSURE: 106 MMHG

## 2018-01-24 VITALS
OXYGEN SATURATION: 94 % | TEMPERATURE: 96.3 F | HEART RATE: 108 BPM | RESPIRATION RATE: 18 BRPM | SYSTOLIC BLOOD PRESSURE: 147 MMHG | DIASTOLIC BLOOD PRESSURE: 72 MMHG

## 2018-01-24 VITALS — HEART RATE: 97 BPM

## 2018-01-24 VITALS — HEART RATE: 122 BPM

## 2018-01-24 LAB
BASOPHILS # BLD AUTO: 0 TH/MM3 (ref 0–0.2)
BASOPHILS NFR BLD: 0.2 % (ref 0–2)
BUN SERPL-MCNC: 20 MG/DL (ref 7–18)
BURR CELLS BLD QL SMEAR: (no result)
CALCIUM SERPL-MCNC: 8 MG/DL (ref 8.5–10.1)
CHLORIDE SERPL-SCNC: 102 MEQ/L (ref 98–107)
CREAT SERPL-MCNC: 0.5 MG/DL (ref 0.5–1)
EOSINOPHIL # BLD: 0 TH/MM3 (ref 0–0.4)
EOSINOPHIL NFR BLD: 0.2 % (ref 0–4)
ERYTHROCYTE [DISTWIDTH] IN BLOOD BY AUTOMATED COUNT: 21.2 % (ref 11.6–17.2)
GFR SERPLBLD BASED ON 1.73 SQ M-ARVRAT: 117 ML/MIN (ref 89–?)
GLUCOSE SERPL-MCNC: 91 MG/DL (ref 74–106)
HCO3 BLD-SCNC: 20.6 MEQ/L (ref 21–32)
HCT VFR BLD CALC: 37.7 % (ref 35–46)
HGB BLD-MCNC: 12.2 GM/DL (ref 11.6–15.3)
LYMPHOCYTES # BLD AUTO: 1.1 TH/MM3 (ref 1–4.8)
LYMPHOCYTES NFR BLD AUTO: 7.4 % (ref 9–44)
LYMPHOCYTES: 3 % (ref 9–44)
MCH RBC QN AUTO: 27.8 PG (ref 27–34)
MCHC RBC AUTO-ENTMCNC: 32.4 % (ref 32–36)
MCV RBC AUTO: 85.9 FL (ref 80–100)
MONOCYTE #: 1.1 TH/MM3 (ref 0–0.9)
MONOCYTES NFR BLD: 7.4 % (ref 0–8)
MONOCYTES: 2 % (ref 0–8)
MYELOCYTES NFR BLD: 2 % (ref 0–0)
NEUTROPHILS # BLD AUTO: 12.9 TH/MM3 (ref 1.8–7.7)
NEUTROPHILS NFR BLD AUTO: 84.8 % (ref 16–70)
NEUTS BAND # BLD MANUAL: 14.4 TH/MM3 (ref 1.8–7.7)
NEUTS BAND NFR BLD: 19 % (ref 0–6)
NEUTS SEG NFR BLD MANUAL: 74 % (ref 16–70)
OVALOCYTES BLD QL SMEAR: (no result)
PLATELET # BLD: 403 TH/MM3 (ref 150–450)
PMV BLD AUTO: 8.4 FL (ref 7–11)
RBC # BLD AUTO: 4.39 MIL/MM3 (ref 4–5.3)
SODIUM SERPL-SCNC: 133 MEQ/L (ref 136–145)
TOXIC GRANULES BLD QL SMEAR: (no result)
WBC # BLD AUTO: 15.2 TH/MM3 (ref 4–11)

## 2018-01-24 RX ADMIN — VITAMIN C SCH TAB: TAB at 09:24

## 2018-01-24 RX ADMIN — PANTOPRAZOLE SCH MG: 40 TABLET, DELAYED RELEASE ORAL at 09:12

## 2018-01-24 RX ADMIN — Medication SCH TAB: at 09:24

## 2018-01-24 RX ADMIN — SODIUM PHOSPHATE, MONOBASIC, MONOHYDRATE SCH MLS/HR: 276; 142 INJECTION, SOLUTION INTRAVENOUS at 20:39

## 2018-01-24 RX ADMIN — I.V. FAT EMULSION SCH MLS/HR: 20 EMULSION INTRAVENOUS at 20:45

## 2018-01-24 RX ADMIN — SERTRALINE HYDROCHLORIDE SCH MG: 50 TABLET, FILM COATED ORAL at 09:12

## 2018-01-24 RX ADMIN — CETIRIZINE HYDROCHLORIDE SCH MG: 10 TABLET, FILM COATED ORAL at 09:12

## 2018-01-24 RX ADMIN — ACETAMINOPHEN PRN MG: 325 TABLET ORAL at 15:39

## 2018-01-24 RX ADMIN — Medication PRN ML: at 05:03

## 2018-01-24 RX ADMIN — PHENYTOIN SODIUM SCH MLS/HR: 50 INJECTION INTRAMUSCULAR; INTRAVENOUS at 02:19

## 2018-01-24 RX ADMIN — Medication SCH MG: at 09:24

## 2018-01-24 RX ADMIN — ACETAMINOPHEN PRN MG: 325 TABLET ORAL at 09:13

## 2018-01-24 RX ADMIN — Medication SCH ML: at 20:44

## 2018-01-24 RX ADMIN — POTASSIUM CHLORIDE SCH MEQ: 20 TABLET, EXTENDED RELEASE ORAL at 09:24

## 2018-01-24 RX ADMIN — ONDANSETRON PRN MG: 4 TABLET, ORALLY DISINTEGRATING ORAL at 14:43

## 2018-01-24 RX ADMIN — SODIUM CHLORIDE SCH MLS/HR: 900 INJECTION INTRAVENOUS at 12:01

## 2018-01-24 RX ADMIN — PHENYTOIN SODIUM SCH MLS/HR: 50 INJECTION INTRAMUSCULAR; INTRAVENOUS at 13:05

## 2018-01-24 RX ADMIN — Medication SCH ML: at 09:13

## 2018-01-24 RX ADMIN — ATORVASTATIN CALCIUM SCH MG: 20 TABLET, FILM COATED ORAL at 20:39

## 2018-01-24 NOTE — HHI.PR
Subjective


Remarks


Pt had two soft BMs yesterday


No BMs overnight and none so far today


Pt not passing much gas


No nausea/vomiting but no appetite


She did not eat anything off her breakfast tray. 


Pt did not sleep well last night and took a Xanax this morning and is rather 

lethargic at the time of examination





Objective


Vitals





Vital Signs








  Date Time  Temp Pulse Resp B/P (MAP) Pulse Ox O2 Delivery O2 Flow Rate FiO2


 


1/24/18 10:23   18     


 


1/24/18 08:00  111      


 


1/24/18 08:00 96.1 111 18 136/73 (94) 91   


 


1/24/18 04:00 96.3 108 18 147/72 (97) 94   


 


1/24/18 04:00  108      


 


1/24/18 00:00 96.7 110 20 139/70 (93) 93   


 


1/23/18 23:42  110      


 


1/23/18 20:00 96.7 111 18 131/77 (95) 94   


 


1/23/18 16:00 97.2 107 18 127/58 (81) 97   


 


1/23/18 12:06  108      


 


1/23/18 12:00 97.6 103 17 171/87 (115) 95   








Result Diagram:  


1/24/18 0600                                                                   

             1/24/18 0600





Other Results





 Laboratory Tests








Test


  1/23/18


04:20 1/24/18


06:00


 


Urine Color DARK-BROWN  


 


Urine Turbidity CLEAR  


 


Urine pH 6.0  


 


Urine Specific Gravity 1.030  


 


Urine Protein 30 mg/dL  


 


Urine Glucose (UA) NEG mg/dL  


 


Urine Ketones 10 mg/dL  


 


Urine Occult Blood NEG  


 


Urine Nitrite NEG  


 


Urine Bilirubin NEG  


 


Urine Urobilinogen 2.0 MG/DL  


 


Urine Leukocyte Esterase MOD  


 


Urine RBC 1 /hpf  


 


Urine WBC 6 /hpf  


 


Urine Squamous Epithelial


Cells 1 /hpf 


  


 


 


Urine Transitional Epithelial


Cells 1 /hpf 


  


 


 


Urine Bacteria RARE /hpf  


 


Urine Hyaline Casts 4 /lpf  


 


Urine Mucus FEW /lpf  


 


Microscopic Urinalysis Comment


  CATH-CULTURE


IND 


 


 


White Blood Count  15.2 TH/MM3 


 


Red Blood Count  4.39 MIL/MM3 


 


Hemoglobin  12.2 GM/DL 


 


Hematocrit  37.7 % 


 


Mean Corpuscular Volume  85.9 FL 


 


Mean Corpuscular Hemoglobin  27.8 PG 


 


Mean Corpuscular Hemoglobin


Concent 


  32.4 % 


 


 


Red Cell Distribution Width  21.2 % 


 


Platelet Count  403 TH/MM3 


 


Mean Platelet Volume  8.4 FL 


 


Neutrophils (%) (Auto)  84.8 % 


 


Lymphocytes (%) (Auto)  7.4 % 


 


Monocytes (%) (Auto)  7.4 % 


 


Eosinophils (%) (Auto)  0.2 % 


 


Basophils (%) (Auto)  0.2 % 


 


Neutrophils # (Auto)  12.9 TH/MM3 


 


Lymphocytes # (Auto)  1.1 TH/MM3 


 


Monocytes # (Auto)  1.1 TH/MM3 


 


Eosinophils # (Auto)  0.0 TH/MM3 


 


Basophils # (Auto)  0.0 TH/MM3 


 


CBC Comment  AUTO DIFF 


 


Differential Total Cells


Counted 


  100 


 


 


Neutrophils % (Manual)  74 % 


 


Band Neutrophils %  19 % 


 


Lymphocytes %  3 % 


 


Monocytes %  2 % 


 


Neutrophils # (Manual)  14.4 TH/MM3 


 


Myelocytes  2 % 


 


Differential Comment


  


  FINAL DIFF


MANUAL


 


Toxic Granulation  1+ 


 


Platelet Estimate  NORMAL 


 


Platelet Morphology Comment  NORMAL 


 


Ovalocytes  1+ 


 


Machias Cells  1+ 


 


Blood Urea Nitrogen  20 MG/DL 


 


Creatinine  0.50 MG/DL 


 


Random Glucose  91 MG/DL 


 


Calcium Level  8.0 MG/DL 


 


Sodium Level  133 MEQ/L 


 


Potassium Level  4.5 MEQ/L 


 


Chloride Level  102 MEQ/L 


 


Carbon Dioxide Level  20.6 MEQ/L 


 


Anion Gap  10 MEQ/L 


 


Estimat Glomerular Filtration


Rate 


  117 ML/MIN 


 








Imaging





Last Impressions








Chest X-Ray 1/22/18 0000 Signed





Impressions: 





 Service Date/Time:  Monday, January 22, 2018 09:33 - CONCLUSION:  1. Slightly 





 progressed left lower lobe airspace disease and associated trace pleural 





 effusion.     Anupam Sierra MD 


 


Abdomen/Pelvis CT 1/20/18 1840 Signed





Impressions: 





 Service Date/Time:  Saturday, January 20, 2018 20:06 - CONCLUSION:  1. Diffuse 





 colitis with slight inflammatory changes. There is also scattered 





 diverticulosis. No perforation. 2. Minimal ascites. 3. Calcified gallstones. 

4. 





 Hepatic and right renal densities likely cysts     Fritz Doyle MD 








Last Impressions








Chest X-Ray 1/20/18 2118 Signed





Impressions: 





 Service Date/Time:  Saturday, January 20, 2018 21:52 - CONCLUSION:  1. Left 





 basilar scarring. 2. No pneumonia.     Fritz Doyle MD 


 


Abdomen/Pelvis CT 1/20/18 1840 Signed





Impressions: 





 Service Date/Time:  Saturday, January 20, 2018 20:06 - CONCLUSION:  1. Diffuse 





 colitis with slight inflammatory changes. There is also scattered 





 diverticulosis. No perforation. 2. Minimal ascites. 3. Calcified gallstones. 

4. 





 Hepatic and right renal densities likely cysts     Fritz Doyle MD 








Objective Remarks


General: NAD, AAOx3, lethargic


Chest: CTA


Cardiac: Tachy, regular


Abd: Minimal BS, soft, mildly distended, nontender


Ext: No edema





A/P


Problem List:  


(1) C. difficile colitis


ICD Codes:  A04.72 - Enterocolitis due to Clostridium difficile, not specified 

as recurrent


Plan:  


- Pt is an 86 y/o female who presented to the ED with complaints of multiple 

episodes of diarrhea for 4 days prior to admission associated with left sided 

abdominal pain. 


 Patient describes the pain as a intermittent cramping sensation moderate in 

severity. No recent antibiotic use or evidence of bleeding in the stool.  


- CT of abdomen (1/20/18) --> Diffuse colitis with slight inflammatory changes.

  There is also scattered diverticulosis.  No perforation.  Mild ascites.  Also 

had gallstones. 


  Hepatic and right renal disease likely cyst.


- Stool positive for C. difficile patient started on Flagyl 500mg po Q8H on 1/21 /18


- Diarrhea improving


- Monitor clinical status closely, consider KUB to assess for ileus


- Supportive care


- Physical therapy recommends rehab at time of DC








(2) Hypokalemia


ICD Codes:  E87.6 - Hypokalemia


Status:  Resolved


Plan:  


- Improved with replacement


- Potassium 2.6 on admission -> 4.8 (1/21) -> 4.1 (1/22) --> 4.5 (1/24)


- Magnesium 2.5 (1/21)





(3) Dehydration


ICD Codes:  E86.0 - Dehydration


Plan:  


- BUN 32, creatinine 0.91, estimated GFR 59 on admission


- Dehydration likely secondary to diarrhea


- Pt has been on IV fluids but she has CHF and need to monitor for ss of fluid 

overload


- CXR requested (1/22) --> Slightly progressed left lower lobe airspace disease 

and associated trace pleural effusion.  


- Labs on 1/24 with BUn 20, Cr 0.50, 


- On 1/23 telemetry reveals ST in the 110s, likely secondary to dehydration


- Pt is on gentle IVF as she is still not eating much of anything. 


- Continue to monitor telemetry





(4) Possible urinary tract infection


ICD Codes:  R39.89 - Other symptoms and signs involving the genitourinary system


Plan:  


- Urinalysis on 1/21 reviewed and reveals positive nitrates small amount of 

leukocyte esterase culture indicated and pending


- Urine culture reveals: Citrobacter Amalonaticus sensitive to Rocephin.  


- Pt was started on Rocephin IV on 1/23


- Repeat urine culture is pending





(5) Anxiety


ICD Codes:  F41.9 - Anxiety disorder, unspecified


Status:  Chronic


Plan:  


- Continue patient's home Zoloft 150 mg by mouth daily next line 


- Also continue patient's home Xanax 0.25 mg by mouth 3 times a day as needed 

for anxiety





(6) Aortic valve stenosis, severe


ICD Codes:  I35.0 - Nonrheumatic aortic (valve) stenosis


Status:  Acute


Plan:  - Previous admission from 8/18-8/21 for SOB and felt to have acute CHF 

symptoms related to severe AVS. 


- 2D echocardiogram (8/19/17) which noted severe aortic valve stenosis and she 

was initially decompensated. 


- She was seen by CTS who did the initial evaluation for TAVR vs traditional 

AVR but due to her issues with GIB this has been put on hold.


- She is no longer on any anticoagulants


- Patient appears compensated at this point 





(7) CHF (congestive heart failure)


ICD Codes:  I50.9 - Heart failure, unspecified


Status:  Acute


Plan:  


- Patient is regularly on Lasix 20 mg by mouth daily but this was held 

secondary to dehydration


- Once patient is hydrated and diarrhea is improved may need to resume





- 2-D echocardiogram (8/19/2017)


   - Normal left ventricular size, Mild concentric left ventricular hypertrophy


   - Estimated ejection fraction 55-60%


   - Severe aortic valve stenosis


   - AV mean gradient 44.0 mmHg


   - Mild aortic valve regurgitation


   - Mild/moderate mitral valve regurgitation


   - Mitral annular calcification is present


   - Severe thickening of the aortic valve leaflets


   - There is mild tricuspid valve regurgitation


   - There's estimated pulmonary hypertension present range of 40-50 mmHg


   - Pulmonary valve is not well visualized.





(8) Hyperlipidemia


ICD Codes:  E78.5 - Hyperlipidemia, unspecified


Status:  Chronic


Plan:  


- Cont. patient's home atorvastatin 20mg daily at bedtime





Assessment and Plan


Patient examined.


Assessment and plan formulated with Kathryn Mcgowan PA-C.


I agree with the above.


c.diff colitis.. uti


poor po. looks miserable


start ppn. abx. supportive care.











Kathryn Mcgowan Jan 24, 2018 10:57


Jaxson Rose MD Jan 24, 2018 13:17

## 2018-01-24 NOTE — RADRPT
EXAM DATE/TIME:  01/24/2018 13:41 

 

[facility] COMPARISON:     

CHEST SINGLE AP, January 22, 2018, 9:33.

 

                     

INDICATIONS :     

Short of breath.

                     

 

MEDICAL HISTORY :            

Cardiovascular disease. Congestive heart failure. Hypertension.   

 

SURGICAL HISTORY :     

Hysterectomy.   

 

ENCOUNTER:     

Subsequent                                        

 

ACUITY:     

4 - 6 days      

 

PAIN SCORE:     

0/10

 

LOCATION:     

Bilateral chest 

 

FINDINGS:     

Minimal bibasilar parenchymal changes are noted worse on the left than the right.  Findings have prog
ressed on the left.  There is no congestive failure.  There is no pleural effusion.

 

CONCLUSION:     

Minimal progression of parenchymal changes left base..

 

 

 

 Tanner Vann MD FACR on January 24, 2018 at 14:31           

Board Certified Radiologist.

 This report was verified electronically.

## 2018-01-24 NOTE — RADRPT
EXAM DATE/TIME:  01/24/2018 13:44 

 

[facility] COMPARISON:     

CT ABDOMEN & PELVIS W CONTRAST, January 20, 2018, 20:06.  ABDOMEN KUB ONLY, September 21, 2016, 14:26
.

 

                     

INDICATIONS :     

Abdomen pain.

                     

 

MEDICAL HISTORY :            

Cardiovascular disease. Congestive heart failure. Hypertension.   

 

SURGICAL HISTORY :     

Hysterectomy.   

 

ENCOUNTER:     

Subsequent                                        

 

ACUITY:     

4 - 6 days      

 

PAIN SCORE:     

7/10

 

LOCATION:     

Bilateral  Abdomen.

 

FINDINGS:     

Calcified gallstones upper quadrant.  Persistent dilatation of transverse colon to 8 cm.  Moderate di
stal small bowel gas is noted.  The descending colon is gasless.

 

CONCLUSION:     

Gaseous distention as described above.  Descending colon is gasless.  CT scan had shown colitis. Ther
e is no free air.

 

 

 

 Tanner Vann MD FACR on January 24, 2018 at 14:35           

Board Certified Radiologist.

 This report was verified electronically.

## 2018-01-25 VITALS
HEART RATE: 100 BPM | DIASTOLIC BLOOD PRESSURE: 67 MMHG | TEMPERATURE: 96.6 F | OXYGEN SATURATION: 93 % | SYSTOLIC BLOOD PRESSURE: 121 MMHG | RESPIRATION RATE: 20 BRPM

## 2018-01-25 VITALS
OXYGEN SATURATION: 93 % | SYSTOLIC BLOOD PRESSURE: 118 MMHG | HEART RATE: 103 BPM | DIASTOLIC BLOOD PRESSURE: 68 MMHG | RESPIRATION RATE: 18 BRPM | TEMPERATURE: 98.3 F

## 2018-01-25 VITALS
HEART RATE: 99 BPM | OXYGEN SATURATION: 95 % | RESPIRATION RATE: 18 BRPM | SYSTOLIC BLOOD PRESSURE: 129 MMHG | DIASTOLIC BLOOD PRESSURE: 73 MMHG | TEMPERATURE: 96.5 F

## 2018-01-25 VITALS
DIASTOLIC BLOOD PRESSURE: 63 MMHG | HEART RATE: 108 BPM | SYSTOLIC BLOOD PRESSURE: 126 MMHG | OXYGEN SATURATION: 93 % | RESPIRATION RATE: 26 BRPM | TEMPERATURE: 98.2 F

## 2018-01-25 VITALS
RESPIRATION RATE: 20 BRPM | TEMPERATURE: 97.1 F | OXYGEN SATURATION: 95 % | DIASTOLIC BLOOD PRESSURE: 71 MMHG | HEART RATE: 98 BPM | SYSTOLIC BLOOD PRESSURE: 128 MMHG

## 2018-01-25 VITALS
RESPIRATION RATE: 26 BRPM | OXYGEN SATURATION: 93 % | SYSTOLIC BLOOD PRESSURE: 99 MMHG | TEMPERATURE: 99.8 F | DIASTOLIC BLOOD PRESSURE: 53 MMHG | HEART RATE: 114 BPM

## 2018-01-25 VITALS — HEART RATE: 109 BPM

## 2018-01-25 VITALS — HEART RATE: 97 BPM

## 2018-01-25 VITALS — HEART RATE: 89 BPM

## 2018-01-25 LAB
ACANTHOCYTES BLD QL SMEAR: (no result)
BASOPHILS # BLD AUTO: 0 TH/MM3 (ref 0–0.2)
BASOPHILS NFR BLD: 0.1 % (ref 0–2)
BUN SERPL-MCNC: 26 MG/DL (ref 7–18)
BURR CELLS BLD QL SMEAR: (no result)
CALCIUM SERPL-MCNC: 7.3 MG/DL (ref 8.5–10.1)
CALCIUM TP COR SERPL-MCNC: 8.3 MG/DL (ref 8.5–10.1)
CHLORIDE SERPL-SCNC: 103 MEQ/L (ref 98–107)
CREAT SERPL-MCNC: 0.85 MG/DL (ref 0.5–1)
EOSINOPHIL # BLD: 0 TH/MM3 (ref 0–0.4)
EOSINOPHIL NFR BLD: 0 % (ref 0–4)
ERYTHROCYTE [DISTWIDTH] IN BLOOD BY AUTOMATED COUNT: 21.1 % (ref 11.6–17.2)
GFR SERPLBLD BASED ON 1.73 SQ M-ARVRAT: 64 ML/MIN (ref 89–?)
GLUCOSE SERPL-MCNC: 124 MG/DL (ref 74–106)
HCO3 BLD-SCNC: 21.6 MEQ/L (ref 21–32)
HCT VFR BLD CALC: 35.2 % (ref 35–46)
HGB BLD-MCNC: 11.6 GM/DL (ref 11.6–15.3)
LYMPHOCYTES # BLD AUTO: 0.3 TH/MM3 (ref 1–4.8)
LYMPHOCYTES NFR BLD AUTO: 1.4 % (ref 9–44)
MAGNESIUM SERPL-MCNC: 2.1 MG/DL (ref 1.5–2.5)
MCH RBC QN AUTO: 28.2 PG (ref 27–34)
MCHC RBC AUTO-ENTMCNC: 33 % (ref 32–36)
MCV RBC AUTO: 85.5 FL (ref 80–100)
METAMYELOCYTES NFR BLD: 1 % (ref 0–1)
MONOCYTE #: 0.8 TH/MM3 (ref 0–0.9)
MONOCYTES NFR BLD: 3.8 % (ref 0–8)
MONOCYTES: 3 % (ref 0–8)
NEUTROPHILS # BLD AUTO: 20.6 TH/MM3 (ref 1.8–7.7)
NEUTROPHILS NFR BLD AUTO: 94.7 % (ref 16–70)
NEUTS BAND # BLD MANUAL: 21 TH/MM3 (ref 1.8–7.7)
NEUTS BAND NFR BLD: 36 % (ref 0–6)
NEUTS SEG NFR BLD MANUAL: 60 % (ref 16–70)
PLATELET # BLD: 358 TH/MM3 (ref 150–450)
PMV BLD AUTO: 8.5 FL (ref 7–11)
PROT SERPL-MCNC: 5.3 GM/DL (ref 6.4–8.2)
RBC # BLD AUTO: 4.12 MIL/MM3 (ref 4–5.3)
SODIUM SERPL-SCNC: 134 MEQ/L (ref 136–145)
WBC # BLD AUTO: 21.7 TH/MM3 (ref 4–11)
WBC TOXIC VACUOLES BLD QL SMEAR: PRESENT

## 2018-01-25 RX ADMIN — Medication SCH ML: at 09:00

## 2018-01-25 RX ADMIN — ACETAMINOPHEN PRN MG: 325 TABLET ORAL at 19:23

## 2018-01-25 RX ADMIN — VITAMIN C SCH TAB: TAB at 09:28

## 2018-01-25 RX ADMIN — SERTRALINE HYDROCHLORIDE SCH MG: 50 TABLET, FILM COATED ORAL at 09:29

## 2018-01-25 RX ADMIN — ATORVASTATIN CALCIUM SCH MG: 20 TABLET, FILM COATED ORAL at 22:46

## 2018-01-25 RX ADMIN — Medication SCH ML: at 21:00

## 2018-01-25 RX ADMIN — ONDANSETRON PRN MG: 4 TABLET, ORALLY DISINTEGRATING ORAL at 09:35

## 2018-01-25 RX ADMIN — ACETAMINOPHEN PRN MG: 325 TABLET ORAL at 13:45

## 2018-01-25 RX ADMIN — POTASSIUM CHLORIDE SCH MEQ: 20 TABLET, EXTENDED RELEASE ORAL at 09:28

## 2018-01-25 RX ADMIN — Medication SCH TAB: at 09:28

## 2018-01-25 RX ADMIN — VANCOMYCIN HYDROCHLORIDE SCH MG: 500 INJECTION, POWDER, LYOPHILIZED, FOR SOLUTION INTRAVENOUS at 13:45

## 2018-01-25 RX ADMIN — CETIRIZINE HYDROCHLORIDE SCH MG: 10 TABLET, FILM COATED ORAL at 09:28

## 2018-01-25 RX ADMIN — I.V. FAT EMULSION SCH MLS/HR: 20 EMULSION INTRAVENOUS at 22:50

## 2018-01-25 RX ADMIN — VANCOMYCIN HYDROCHLORIDE SCH MG: 500 INJECTION, POWDER, LYOPHILIZED, FOR SOLUTION INTRAVENOUS at 16:44

## 2018-01-25 RX ADMIN — PANTOPRAZOLE SCH MG: 40 TABLET, DELAYED RELEASE ORAL at 09:28

## 2018-01-25 RX ADMIN — VANCOMYCIN HYDROCHLORIDE SCH MG: 500 INJECTION, POWDER, LYOPHILIZED, FOR SOLUTION INTRAVENOUS at 22:46

## 2018-01-25 RX ADMIN — Medication SCH MG: at 09:28

## 2018-01-25 RX ADMIN — ACETAMINOPHEN PRN MG: 325 TABLET ORAL at 04:55

## 2018-01-25 RX ADMIN — PHENYTOIN SODIUM SCH MLS/HR: 50 INJECTION INTRAMUSCULAR; INTRAVENOUS at 15:30

## 2018-01-25 RX ADMIN — Medication PRN ML: at 03:38

## 2018-01-25 RX ADMIN — SODIUM PHOSPHATE, MONOBASIC, MONOHYDRATE SCH MLS/HR: 276; 142 INJECTION, SOLUTION INTRAVENOUS at 22:51

## 2018-01-25 NOTE — PD.CONS
HPI


History of Present Illness


This is a 85 year old white female who came in on 01/20/18 with multiple 

episodes of diarrhea and left-sided abdominal pain to the record.  Currently 

patient is drowsy and unable to give history, but does state that she continues 

to have diarrhea and bloating, with some nausea, but no vomiting now.  Patient 

was given Xanax by mouth a short period of time ago but does rales to verbal 

stimuli.  According to the record patient has had ongoing issues with anemia 

and GI bleed which has required some transfusions back in September 2017.  She 

has also had significant cardiovascular disease and has been seen per the 

cardiothoracic surgeons and cardiology.  On admission patient denied any fevers 

chills or shortness of breath  Now is currently being maintained on 

hyperalimentation IV, PICC line, stool shows positive for C. difficile colitis.

  Currently patient is being treated with by mouth vancomycin, by mouth Flagyl,

and Protonix 40 mg daily.


Labs include current WBC count 21.7, hemoglobin stable at 11.6.


 (Noelle Dickens)





PFSH


Past Medical History


Valdez's esophagus


Severe Diverticulosis


Hypertension


Hyperlipidemia


Lumbar stenosis


Macular degeneration


Depression anxiety


Severe aortic stenosis


Iron deficiency anemia


All of this information is per the record


Past Surgical History


Antegrade single balloon enteroscopy on 08/07/17


Capsule endoscopy 08/01/17


Small bowel enteroscopy 06/01/17 anoscopy June 2017


Knee arthroplasty


VICKEY


 (Noelle Dickens)


Coded Allergies:  


     morphine (Unverified  Allergy, Intermediate, 1/20/18)


Medications





Administered Medications








 Medications


  (Trade)  Dose


 Ordered  Sig/Brigitte


 Route


 PRN Reason  Start Time


 Stop Time Status Last Admin


Dose Admin


 


 Sodium Chloride


  (NS Flush)  2 ml  UNSCH  PRN


 IV FLUSH


 FLUSH AFTER USING IV ACCESS  1/20/18 21:30


    1/25/18 03:38


 


 


 Sodium Chloride


  (NS Flush)  2 ml  BID


 IV FLUSH


   1/21/18 09:00


    1/24/18 20:44


 


 


 Acetaminophen


  (Tylenol)  650 mg  Q4H  PRN


 PO


 TEMP > 100.4, pain 1-10  1/20/18 21:30


    1/25/18 13:45


 


 


 Metronidazole


  (Flagyl)  500 mg  Q8HR


 PO


   1/21/18 08:00


    1/25/18 13:46


 


 


 Alprazolam


  (Xanax)  0.25 mg  TID  PRN


 PO


 ANXIETY  1/20/18 21:30


    1/24/18 15:39


 


 


 Atorvastatin


 Calcium


  (Lipitor)  20 mg  HS


 PO


   1/21/18 21:00


    1/24/18 20:39


 


 


 Cetirizine HCl


  (ZyrTEC)  10 mg  DAILY


 PO


   1/21/18 09:00


    1/25/18 09:28


 


 


 Vit C/Vit E/Zinc/


 Copper/Lutein


  (Ocuvite)  1 tab  DAILY


 PO


   1/21/18 09:00


    1/25/18 09:28


 


 


 Pantoprazole


 Sodium


  (Protonix)  40 mg  DAILY


 PO


   1/21/18 09:00


    1/25/18 09:28


 


 


 Polysaccharide


 Iron Complex


  (Nu-Iron)  150 mg  DAILY


 PO


   1/21/18 09:00


    1/25/18 09:28


 


 


 Potassium Chloride


  (KCl)  20 meq  DAILY


 PO


   1/21/18 09:00


    1/25/18 09:28


 


 


 Sertraline HCl


  (Zoloft)  150 mg  DAILY


 PO


   1/21/18 09:00


    1/25/18 09:29


 


 


 Vitamin B Complex/


 Vitamin C


  (Allbee C)  1 tab  DAILY


 PO


   1/21/18 09:00


    1/25/18 09:28


 


 


 Ondansetron HCl 8


 mg/Dextrose  54 ml @ 


 216 mls/hr  Q6H  PRN


 IV


 NAUSEA OR VOMITING  1/21/18 11:00


    1/22/18 15:58


 


 


 Sodium Chloride  1,000 ml @ 


 50 mls/hr  Q20H


 IV


   1/21/18 13:30


    1/24/18 13:05


 


 


 Ondansetron HCl


  (Zofran  Odt)  4 mg  Q6H  PRN


 PO


 NAUSEA OR VOMITING  1/23/18 09:15


    1/25/18 09:35


 


 


 Sodium Chloride


 11 meq/Sodium


 Acetate 59 meq/


 Potassium


 Chloride 40 meq/


 Sodium Phosphate


 40 meq/Magnesium


 Chloride 10 meq/


 Calcium Chloride


 9 meq/


 Multivitamins 10


 ml/Folic Acid 1


 mg/Amino Acids/


 Dextrose  2,084.1437


 ml @ 70


 mls/hr  Q24H


 IV


   1/24/18 20:00


    1/24/18 20:39


 


 


 Fat Emulsion


 Intravenous  250 ml @ 


 10 mls/hr  Q24H


 IV


   1/24/18 20:00


    1/24/18 20:45


 


 


 Vancomycin HCl


  (VANCOMYCIN for


 oral use only)  125 mg  QID


 PO


   1/25/18 13:00


    1/25/18 13:45


 








Social History


See H&P


 (Noelle Dickens)





Review of Systems


Gastrointestinal:  COMPLAINS OF: Abdominal pain, Diarrhea, Nausea (Noelle Dickens)





GI Exam


Vitals I&O





Vital Signs








  Date Time  Temp Pulse Resp B/P (MAP) Pulse Ox O2 Delivery O2 Flow Rate FiO2


 


1/25/18 12:00 96.5 99 18 129/73 (91) 95   


 


1/25/18 08:00 98.3 103 18 118/68 (85) 93   


 


1/25/18 04:00 98.2 108 26 126/63 (84) 93   


 


1/25/18 03:29  109      


 


1/25/18 00:00  114      


 


1/25/18 00:00 99.8 118 26 99/53 (68) 93   


 


1/24/18 20:00 98.8 124 22 106/61 (76) 92   


 


1/24/18 19:45  122      


 


1/24/18 16:41   18     


 


1/24/18 16:35  97      


 


1/24/18 16:00 97.1 100 18 135/75 (95) 92   














I/O      


 


 1/24/18 1/24/18 1/24/18 1/25/18 1/25/18 1/25/18





 07:00 15:00 23:00 07:00 15:00 23:00


 


Intake Total 771 ml 689 ml 510 ml 581 ml  


 


Balance 771 ml 689 ml 510 ml 581 ml  


 


      


 


Intake Oral 360 ml  340 ml 60 ml  


 


IV Total 411 ml 689 ml 170 ml 521 ml  


 


# Voids 5  4 1  


 


# Bowel Movements 0  1 1  








Imaging





Last Impressions








Abdomen/Pelvis CT 1/25/18 0000 Signed





Impressions: 





 Service Date/Time:  Thursday, January 25, 2018 10:25 - CONCLUSION:  1. 

Worsening 





 colonic distention. The colon is fluid filled. No significant inflammatory 





 process is seen involving the colon, however, pan colitis could have this 





 appearance. 2. Small volume ascites. 3. New small bilateral pleural effusions 





 and bibasilar atelectasis. 4. Cholelithiasis.     Theodore Duran Jr., MD 


 


Chest X-Ray 1/24/18 0000 Signed





Impressions: 





 Service Date/Time:  Wednesday, January 24, 2018 13:41 - CONCLUSION:  Minimal 





 progression of parenchymal changes left base..     Tanner Vann MD  FACR


 


Abdomen X-Ray 1/24/18 0000 Signed





Impressions: 





 Service Date/Time:  Wednesday, January 24, 2018 13:44 - CONCLUSION:  Gaseous 





 distention as described above.  Descending colon is gasless.  CT scan had 

shown 





 colitis. There is no free air.     Tanner Vann MD  FACR








Laboratory











Test


  1/25/18


06:00


 


White Blood Count 21.7 TH/MM3 


 


Red Blood Count 4.12 MIL/MM3 


 


Hemoglobin 11.6 GM/DL 


 


Hematocrit 35.2 % 


 


Mean Corpuscular Volume 85.5 FL 


 


Mean Corpuscular Hemoglobin 28.2 PG 


 


Mean Corpuscular Hemoglobin


Concent 33.0 % 


 


 


Red Cell Distribution Width 21.1 % 


 


Platelet Count 358 TH/MM3 


 


Mean Platelet Volume 8.5 FL 


 


Neutrophils (%) (Auto) 94.7 % 


 


Lymphocytes (%) (Auto) 1.4 % 


 


Monocytes (%) (Auto) 3.8 % 


 


Eosinophils (%) (Auto) 0.0 % 


 


Basophils (%) (Auto) 0.1 % 


 


Neutrophils # (Auto) 20.6 TH/MM3 


 


Lymphocytes # (Auto) 0.3 TH/MM3 


 


Monocytes # (Auto) 0.8 TH/MM3 


 


Eosinophils # (Auto) 0.0 TH/MM3 


 


Basophils # (Auto) 0.0 TH/MM3 


 


CBC Comment AUTO DIFF 


 


Differential Total Cells


Counted 100 


 


 


Neutrophils % (Manual) 60 % 


 


Band Neutrophils % 36 % 


 


Monocytes % 3 % 


 


Neutrophils # (Manual) 21.0 TH/MM3 


 


Metamyelocytes 1 % 


 


Differential Comment


  FINAL DIFF


MANUAL


 


Toxic Vacuolation PRESENT 


 


Platelet Estimate NORMAL 


 


Platelet Morphology Comment NORMAL 


 


Depew Cells 1+ 


 


Acanthocytes OCC 


 


Red Cell Morphology Comment  


 


Blood Urea Nitrogen 26 MG/DL 


 


Creatinine 0.85 MG/DL 


 


Random Glucose 124 MG/DL 


 


Total Protein 5.3 GM/DL 


 


Calcium Level 7.3 MG/DL 


 


Magnesium Level 2.1 MG/DL 


 


Sodium Level 134 MEQ/L 


 


Potassium Level 4.7 MEQ/L 


 


Chloride Level 103 MEQ/L 


 


Carbon Dioxide Level 21.6 MEQ/L 


 


Anion Gap 9 MEQ/L 


 


Estimat Glomerular Filtration


Rate 64 ML/MIN 


 


 


Protein Corrected Calcium 8.3 MG/DL 














 Date/Time


Source Procedure


Growth Status


 


 


 1/20/18 21:45


Stool Stool - Final


NO ENTERIC PATHOGENS DETECTED BY PCR... Complete


 


 1/23/18 04:20


Urine Catheterized Urine Urine Culture - Final


,000 CFU/ML MIXED MERARI... Complete








Physical Examination


HEENT: Pupils round and reactive to light; normocephalic; atraumatic; no 

jaundice.  Throat is clear.


NECK: Neck is supple, no JVD, no lymphadenopathy.


CHEST:  Chest is clear to auscultation and percussion.


CARDIAC:  Regular rate and rhythm with no murmur gallop or rubs.


ABDOMEN:  Soft, nondistended, nontender; no hepatosplenomegaly; bowel sounds 

are present in all four quadrants.


EXTREMITIES: No clubbing, cyanosis, or edema.


SKIN:  Normal; no rash; no jaundice.


CNS:  No focal deficits; alert and oriented times three.


 (Noelle Dickens)





Assessment and Plan


Assessment:  


(1) C. difficile colitis


ICD Codes:  A04.72 - Enterocolitis due to Clostridium difficile, not specified 

as recurrent


(2) Gastritis


ICD Codes:  K29.70 - Gastritis, unspecified, without bleeding


Status:  Acute


(3) Esophagitis


ICD Codes:  K20.9 - Esophagitis, unspecified


Status:  Acute


(4) Hiatal hernia


ICD Codes:  K44.9 - Diaphragmatic hernia without obstruction or gangrene


Status:  Acute


Plan


Initial CT done on 1-20 showed scattered diverticulosis, no perforation, colitis


Repeat CT ABD/1-25 shows  Worsening colonic distention. The colon is fluid 

filled. No significant inflammatory process is seen involving the colon, however

, pan colitis could have this appearance. Small volume ascites. New small 

bilateral pleural effusions and bibasilar atelectasis.   Cholelithiasis.


Labs shows positive stool sample for C. difficile colitis





Plan


Flagyl 500 mg every 8 hours by mouth


vancomycin 125 mg 4 times a day by mouth


PPI Protonix 40 mg daily by mouth


Monitor number of stools, intake and output


Attempt to sit patient up in bed for some mild activity to keep her oriented


Monitor for any acute bright red GI bleed


Monitor for any rectal bleeding


On at her labs which includes H&H





Patient has been seen by myself and Dr. Mcknight, note was done on his behalf


 


 (Noelle Dickens)


Physician Comments


Patient was seen and examined, agree with above-noted, patient is lethargic and 

not communicating well, she is on the appropriate treatment for C. difficile, 

we will monitor her improvement, her abdomen is mildly tender but not distended





Further plan depends on how she do clinically in the next 24-48 hours


 (Birdie Rubio MD)











Noelle Dickens Jan 25, 2018 15:33


Birdie Rubio MD Jan 25, 2018 18:27

## 2018-01-25 NOTE — HHI.PR
Subjective


Remarks


Pt lying in bed, eyes closed and doesn't want to talk much today


She reportedly had two BMs last night but pt unable to tell me the consistency 

of the stools


Her abdomen is slightly more distended today


She had a low grade fever overnight, 99.8





Objective


Vitals





Vital Signs








  Date Time  Temp Pulse Resp B/P (MAP) Pulse Ox O2 Delivery O2 Flow Rate FiO2


 


1/25/18 08:00 98.3 103 18 118/68 (85) 93   


 


1/25/18 04:00 98.2 108 26 126/63 (84) 93   


 


1/25/18 03:29  109      


 


1/25/18 00:00  114      


 


1/25/18 00:00 99.8 118 26 99/53 (68) 93   


 


1/24/18 20:00 98.8 124 22 106/61 (76) 92   


 


1/24/18 19:45  122      


 


1/24/18 16:41   18     


 


1/24/18 16:35  97      


 


1/24/18 16:00 97.1 100 18 135/75 (95) 92   


 


1/24/18 12:00 96.7 110 18 117/75 (89) 92   








Result Diagram:  


1/25/18 0600                                                                   

             1/25/18 0600





Other Results





 Laboratory Tests








Test


  1/24/18


06:00 1/25/18


06:00


 


White Blood Count 15.2 TH/MM3  21.7 TH/MM3 


 


Red Blood Count 4.39 MIL/MM3  4.12 MIL/MM3 


 


Hemoglobin 12.2 GM/DL  11.6 GM/DL 


 


Hematocrit 37.7 %  35.2 % 


 


Mean Corpuscular Volume 85.9 FL  85.5 FL 


 


Mean Corpuscular Hemoglobin 27.8 PG  28.2 PG 


 


Mean Corpuscular Hemoglobin


Concent 32.4 % 


  33.0 % 


 


 


Red Cell Distribution Width 21.2 %  21.1 % 


 


Platelet Count 403 TH/MM3  358 TH/MM3 


 


Mean Platelet Volume 8.4 FL  8.5 FL 


 


Neutrophils (%) (Auto) 84.8 %  94.7 % 


 


Lymphocytes (%) (Auto) 7.4 %  1.4 % 


 


Monocytes (%) (Auto) 7.4 %  3.8 % 


 


Eosinophils (%) (Auto) 0.2 %  0.0 % 


 


Basophils (%) (Auto) 0.2 %  0.1 % 


 


Neutrophils # (Auto) 12.9 TH/MM3  20.6 TH/MM3 


 


Lymphocytes # (Auto) 1.1 TH/MM3  0.3 TH/MM3 


 


Monocytes # (Auto) 1.1 TH/MM3  0.8 TH/MM3 


 


Eosinophils # (Auto) 0.0 TH/MM3  0.0 TH/MM3 


 


Basophils # (Auto) 0.0 TH/MM3  0.0 TH/MM3 


 


CBC Comment AUTO DIFF  AUTO DIFF 


 


Differential Total Cells


Counted 100 


  100 


 


 


Neutrophils % (Manual) 74 %  60 % 


 


Band Neutrophils % 19 %  36 % 


 


Lymphocytes % 3 %  


 


Monocytes % 2 %  3 % 


 


Neutrophils # (Manual) 14.4 TH/MM3  21.0 TH/MM3 


 


Myelocytes 2 %  


 


Differential Comment


  FINAL DIFF


MANUAL FINAL DIFF


MANUAL


 


Toxic Granulation 1+  


 


Platelet Estimate NORMAL  NORMAL 


 


Platelet Morphology Comment NORMAL  NORMAL 


 


Ovalocytes 1+  


 


Richards Cells 1+  1+ 


 


Blood Urea Nitrogen 20 MG/DL  26 MG/DL 


 


Creatinine 0.50 MG/DL  0.85 MG/DL 


 


Random Glucose 91 MG/DL  124 MG/DL 


 


Calcium Level 8.0 MG/DL  7.3 MG/DL 


 


Sodium Level 133 MEQ/L  134 MEQ/L 


 


Potassium Level 4.5 MEQ/L  4.7 MEQ/L 


 


Chloride Level 102 MEQ/L  103 MEQ/L 


 


Carbon Dioxide Level 20.6 MEQ/L  21.6 MEQ/L 


 


Anion Gap 10 MEQ/L  9 MEQ/L 


 


Estimat Glomerular Filtration


Rate 117 ML/MIN 


  64 ML/MIN 


 


 


Metamyelocytes  1 % 


 


Toxic Vacuolation  PRESENT 


 


Acanthocytes  OCC 


 


Red Cell Morphology Comment   


 


Total Protein  5.3 GM/DL 


 


Magnesium Level  2.1 MG/DL 


 


Protein Corrected Calcium  8.3 MG/DL 








Imaging





Last Impressions








Chest X-Ray 1/24/18 0000 Signed





Impressions: 





 Service Date/Time:  Wednesday, January 24, 2018 13:41 - CONCLUSION:  Minimal 





 progression of parenchymal changes left base..     Tanner Vann MD  FACR


 


Abdomen X-Ray 1/24/18 0000 Signed





Impressions: 





 Service Date/Time:  Wednesday, January 24, 2018 13:44 - CONCLUSION:  Gaseous 





 distention as described above.  Descending colon is gasless.  CT scan had 

shown 





 colitis. There is no free air.     Tanner Vann MD  FACR


 


Abdomen/Pelvis CT 1/20/18 1840 Signed





Impressions: 





 Service Date/Time:  Saturday, January 20, 2018 20:06 - CONCLUSION:  1. Diffuse 





 colitis with slight inflammatory changes. There is also scattered 





 diverticulosis. No perforation. 2. Minimal ascites. 3. Calcified gallstones. 

4. 





 Hepatic and right renal densities likely cysts     Fritz Doyle MD 








Last Impressions








Chest X-Ray 1/22/18 0000 Signed





Impressions: 





 Service Date/Time:  Monday, January 22, 2018 09:33 - CONCLUSION:  1. Slightly 





 progressed left lower lobe airspace disease and associated trace pleural 





 effusion.     Anupam Sierra MD 


 


Abdomen/Pelvis CT 1/20/18 1840 Signed





Impressions: 





 Service Date/Time:  Saturday, January 20, 2018 20:06 - CONCLUSION:  1. Diffuse 





 colitis with slight inflammatory changes. There is also scattered 





 diverticulosis. No perforation. 2. Minimal ascites. 3. Calcified gallstones. 

4. 





 Hepatic and right renal densities likely cysts     Fritz Doyle MD 








Last Impressions








Chest X-Ray 1/20/18 2118 Signed





Impressions: 





 Service Date/Time:  Saturday, January 20, 2018 21:52 - CONCLUSION:  1. Left 





 basilar scarring. 2. No pneumonia.     Fritz Doyle MD 


 


Abdomen/Pelvis CT 1/20/18 1840 Signed





Impressions: 





 Service Date/Time:  Saturday, January 20, 2018 20:06 - CONCLUSION:  1. Diffuse 





 colitis with slight inflammatory changes. There is also scattered 





 diverticulosis. No perforation. 2. Minimal ascites. 3. Calcified gallstones. 

4. 





 Hepatic and right renal densities likely cysts     Fritz Doyle MD 








Objective Remarks


General: NAD, lethargic, refuses to open her eyes to questioning but will 

answer some questions


Chest: CTA


Cardiac: Tachy, regular


Abd: Minimal BS, soft, distended, nontender


Ext: No edema





A/P


Problem List:  


(1) C. difficile colitis


ICD Codes:  A04.72 - Enterocolitis due to Clostridium difficile, not specified 

as recurrent


Plan:  


- Pt is an 84 y/o female who presented to the ED with complaints of multiple 

episodes of diarrhea for 4 days prior to admission associated with left sided 

abdominal pain. 


 Patient describes the pain as a intermittent cramping sensation moderate in 

severity. No recent antibiotic use or evidence of bleeding in the stool.  


- CT of abdomen (1/20/18) --> Diffuse colitis with slight inflammatory changes.

  There is also scattered diverticulosis.  No perforation.  Mild ascites.  Also 

had gallstones. 


  Hepatic and right renal disease likely cyst.


- Stool positive for C. difficile patient started on Flagyl 500mg po Q8H on 1/21 /18


- Diarrhea improving


- KUB (1/24) --> Gaseous distention of the transverse colon to 8cm.  Descending 

colon is gasless.  CT scan had shown colitis. There is no free air.  


- Check noncontrasted CT Abd/pelvis today


- Pt had low grade fever last night and WBC count increased to 21.7 with 36% 

bands


- Supportive care


- Physical therapy recommends rehab at time of DC








(2) Hypokalemia


ICD Codes:  E87.6 - Hypokalemia


Status:  Resolved


Plan:  


- Improved with replacement


- Potassium 2.6 on admission -> 4.8 (1/21) -> 4.1 (1/22) --> 4.5 (1/24) --> 4.7 

(1/25)


- Magnesium 2.1 (1/25)





(3) Dehydration


ICD Codes:  E86.0 - Dehydration


Plan:  


- BUN 32, creatinine 0.91, estimated GFR 59 on admission


- Dehydration likely secondary to diarrhea


- Pt has been on IV fluids but she has CHF and need to monitor for ss of fluid 

overload


- CXR requested (1/22) --> Slightly progressed left lower lobe airspace disease 

and associated trace pleural effusion.  


- Labs on 1/24 with BUn 20, Cr 0.50, 


- On 1/23 telemetry reveals ST in the 110s


- Pt was started on PPN on 1/24 as she was not eating anything


- Continue to monitor telemetry





(4) Possible urinary tract infection


ICD Codes:  R39.89 - Other symptoms and signs involving the genitourinary system


Plan:  


- Urinalysis on 1/21 reviewed and reveals positive nitrates small amount of 

leukocyte esterase culture indicated and pending


- Urine culture reveals: Citrobacter Amalonaticus sensitive to Rocephin.  


- Pt was started on Rocephin IV on 1/23


- Repeat urine culture is pending





(5) Anxiety


ICD Codes:  F41.9 - Anxiety disorder, unspecified


Status:  Chronic


Plan:  


- Continue patient's home Zoloft 150 mg by mouth daily next line 


- Try to minimize Xanax 0.25 mg by mouth 3 times a day as needed for anxiety





(6) Aortic valve stenosis, severe


ICD Codes:  I35.0 - Nonrheumatic aortic (valve) stenosis


Status:  Acute


Plan:  


- Previous admission from 8/18-8/21 for SOB and felt to have acute CHF symptoms 

related to severe AVS. 


- 2D echocardiogram (8/19/17) which noted severe aortic valve stenosis and she 

was initially decompensated. 


- She was seen by CTS who did the initial evaluation for TAVR vs traditional 

AVR but due to her issues with GIB this has been put on hold.


- She is no longer on any anticoagulants


- Patient appears compensated at this point 





(7) CHF (congestive heart failure)


ICD Codes:  I50.9 - Heart failure, unspecified


Status:  Acute


Plan:  


- Patient is regularly on Lasix 20 mg by mouth daily but this was held 

secondary to dehydration


- Once patient is hydrated and diarrhea is improved may need to resume





- 2-D echocardiogram (8/19/2017)


   - Normal left ventricular size, Mild concentric left ventricular hypertrophy


   - Estimated ejection fraction 55-60%


   - Severe aortic valve stenosis


   - AV mean gradient 44.0 mmHg


   - Mild aortic valve regurgitation


   - Mild/moderate mitral valve regurgitation


   - Mitral annular calcification is present


   - Severe thickening of the aortic valve leaflets


   - There is mild tricuspid valve regurgitation


   - There's estimated pulmonary hypertension present range of 40-50 mmHg


   - Pulmonary valve is not well visualized.





(8) Hyperlipidemia


ICD Codes:  E78.5 - Hyperlipidemia, unspecified


Status:  Chronic


Plan:  


- Cont. patient's home atorvastatin 20mg daily at bedtime





Assessment and Plan


Patient examined.


Assessment and plan formulated with Kathryn Mcgowan PA-C.


I agree with the above.


c.diff colitis. worsening wbc and colonic distension


poor po intake and severe general weakness.


moans and groans. still oriented and wants full code.


add on vanco po .ask GI eval. cont iv ppn support.


calll daughter for update.





ADDENDUM: SPOKE TO SON DAN IN [place] . UPDATED


HER ON HER CONDITION AND LET HIM KNOW SHE COULD DECLINE


FURTHER AND NEED ICU CARE. HE WILL MAKE PLANS TO COME DOWN,.


HOME: [phone]


CELL: [phone]











Kathryn Mcgowan Jan 25, 2018 09:39


Jaxson Rose MD Jan 25, 2018 13:31

## 2018-01-25 NOTE — RADRPT
EXAM DATE/TIME:  01/25/2018 10:25 

 

[facility] COMPARISON:     

CT ABDOMEN & PELVIS W CONTRAST, January 20, 2018, 20:06.

 

 

INDICATIONS :     

Abdominal pain, distention.

                  

 

ORAL CONTRAST:      

No oral contrast ingested.

                  

 

RADIATION DOSE:     

12.56 CTDIvol (mGy) 

 

 

MEDICAL HISTORY :     

Congestive heart failure. Hypertension. Anemia

 

SURGICAL HISTORY :      

Hysterectomy. 

 

ENCOUNTER:      

Initial

 

ACUITY:      

1 day

 

PAIN SCALE:      

7/10

 

LOCATION:       

Bilateral upper quadrant 

 

TECHNIQUE:     

Volumetric scanning of the abdomen and pelvis was performed.  Using automated exposure control and ad
justment of the mA and/or kV according to patient size, radiation dose was kept as low as reasonably 
achievable to obtain optimal diagnostic quality images.  DICOM format image data is available electro
nically for review and comparison.  

 

FINDINGS: 

Study is degraded by breathing motion artifact.     

 

LOWER LUNGS:     

Small bilateral pleural effusions. The left is larger than the right. Bibasilar atelectasis. This is 
new from the prior study. The heart is normal in size. Significant coronary artery and aortic valvula
r calcifications.

 

LIVER:     

A 2.3 cm low-density lesion is seen involving the left lobe of the liver. Calca units are 18. This is
 stable. The remaining liver is unremarkable on this unenhanced study. Multiple calcified gallstones 
are seen layering within the gallbladder. The gallbladder is well distended but no wall thickening or
 pericholecystic fluid observed.

 

SPLEEN:     

Normal size without lesion.

 

PANCREAS:     

Within normal limits. 

 

KIDNEYS:     

Normal in size and shape.  There is no mass, stone, or hydronephrosis. A 6.7 cm cyst involving the up
per pole the right kidney.

 

ADRENAL GLANDS:     

Within normal limits.

 

VASCULAR:     

There is no aortic aneurysm.

 

BOWEL/MESENTERY:     

The colon is dilated. It is also fluid-filled. No significant wall thickening observed. Small volume 
ascites noted. No free air. The colonic distention is new from the prior study. Some fluid-filled loo
ps of mildly distended small bowel are low within the pelvis. Stomach is decompressed. A small hiatal
 hernia is again seen.

 

ABDOMINAL WALL:     

Within normal limits.

 

RETROPERITONEUM:     

There is no lymphadenopathy.

 

BLADDER:     

No wall thickening or mass.

 

REPRODUCTIVE:     

Within normal limits.

 

INGUINAL:     

There is no lymphadenopathy or hernia.

 

MUSCULOSKELETAL:     

Within normal limits for patient age.

 

CONCLUSION:     

1. Worsening colonic distention. The colon is fluid filled. No significant inflammatory process is se
en involving the colon, however, pan colitis could have this appearance.

2. Small volume ascites.

3. New small bilateral pleural effusions and bibasilar atelectasis.

4. Cholelithiasis.

 

 

 

 Theodore Duran Jr., MD on January 25, 2018 at 10:33           

Board Certified Radiologist.

 This report was verified electronically.

## 2018-01-26 VITALS
OXYGEN SATURATION: 94 % | HEART RATE: 95 BPM | TEMPERATURE: 97.9 F | RESPIRATION RATE: 17 BRPM | DIASTOLIC BLOOD PRESSURE: 85 MMHG | SYSTOLIC BLOOD PRESSURE: 136 MMHG

## 2018-01-26 VITALS
TEMPERATURE: 97.1 F | RESPIRATION RATE: 20 BRPM | DIASTOLIC BLOOD PRESSURE: 72 MMHG | HEART RATE: 83 BPM | OXYGEN SATURATION: 94 % | SYSTOLIC BLOOD PRESSURE: 132 MMHG

## 2018-01-26 VITALS
DIASTOLIC BLOOD PRESSURE: 77 MMHG | HEART RATE: 99 BPM | SYSTOLIC BLOOD PRESSURE: 131 MMHG | RESPIRATION RATE: 17 BRPM | OXYGEN SATURATION: 93 % | TEMPERATURE: 96.8 F

## 2018-01-26 VITALS
DIASTOLIC BLOOD PRESSURE: 69 MMHG | RESPIRATION RATE: 18 BRPM | HEART RATE: 107 BPM | SYSTOLIC BLOOD PRESSURE: 116 MMHG | TEMPERATURE: 97.2 F | OXYGEN SATURATION: 93 %

## 2018-01-26 VITALS
TEMPERATURE: 97.2 F | HEART RATE: 92 BPM | SYSTOLIC BLOOD PRESSURE: 127 MMHG | DIASTOLIC BLOOD PRESSURE: 70 MMHG | RESPIRATION RATE: 20 BRPM | OXYGEN SATURATION: 95 %

## 2018-01-26 VITALS — HEART RATE: 95 BPM

## 2018-01-26 LAB
ACANTHOCYTES BLD QL SMEAR: (no result)
BASOPHILS # BLD AUTO: 0 TH/MM3 (ref 0–0.2)
BASOPHILS NFR BLD: 0.2 % (ref 0–2)
BUN SERPL-MCNC: 26 MG/DL (ref 7–18)
BURR CELLS BLD QL SMEAR: (no result)
CALCIUM SERPL-MCNC: 7 MG/DL (ref 8.5–10.1)
CALCIUM TP COR SERPL-MCNC: 8.3 MG/DL (ref 8.5–10.1)
CHLORIDE SERPL-SCNC: 103 MEQ/L (ref 98–107)
CREAT SERPL-MCNC: 0.56 MG/DL (ref 0.5–1)
EOSINOPHIL # BLD: 0.1 TH/MM3 (ref 0–0.4)
EOSINOPHIL NFR BLD: 1.1 % (ref 0–4)
ERYTHROCYTE [DISTWIDTH] IN BLOOD BY AUTOMATED COUNT: 21.5 % (ref 11.6–17.2)
GFR SERPLBLD BASED ON 1.73 SQ M-ARVRAT: 103 ML/MIN (ref 89–?)
GLUCOSE SERPL-MCNC: 123 MG/DL (ref 74–106)
HCO3 BLD-SCNC: 22.6 MEQ/L (ref 21–32)
HCT VFR BLD CALC: 33.5 % (ref 35–46)
HGB BLD-MCNC: 11.2 GM/DL (ref 11.6–15.3)
LYMPHOCYTES # BLD AUTO: 0.6 TH/MM3 (ref 1–4.8)
LYMPHOCYTES NFR BLD AUTO: 5.1 % (ref 9–44)
LYMPHOCYTES: 4 % (ref 9–44)
MCH RBC QN AUTO: 28.1 PG (ref 27–34)
MCHC RBC AUTO-ENTMCNC: 33.3 % (ref 32–36)
MCV RBC AUTO: 84.3 FL (ref 80–100)
MONOCYTE #: 0.8 TH/MM3 (ref 0–0.9)
MONOCYTES NFR BLD: 7.1 % (ref 0–8)
MONOCYTES: 6 % (ref 0–8)
MYELOCYTES NFR BLD: 2 % (ref 0–0)
NEUTROPHILS # BLD AUTO: 10.4 TH/MM3 (ref 1.8–7.7)
NEUTROPHILS NFR BLD AUTO: 86.5 % (ref 16–70)
NEUTS BAND # BLD MANUAL: 10.8 TH/MM3 (ref 1.8–7.7)
NEUTS BAND NFR BLD: 34 % (ref 0–6)
NEUTS SEG NFR BLD MANUAL: 54 % (ref 16–70)
OVALOCYTES BLD QL SMEAR: (no result)
PLATELET # BLD: 288 TH/MM3 (ref 150–450)
PMV BLD AUTO: 7.9 FL (ref 7–11)
PROT SERPL-MCNC: 4.7 GM/DL (ref 6.4–8.2)
RBC # BLD AUTO: 3.97 MIL/MM3 (ref 4–5.3)
SODIUM SERPL-SCNC: 132 MEQ/L (ref 136–145)
TOXIC GRANULES BLD QL SMEAR: (no result)
WBC # BLD AUTO: 12 TH/MM3 (ref 4–11)

## 2018-01-26 RX ADMIN — VITAMIN C SCH TAB: TAB at 08:43

## 2018-01-26 RX ADMIN — SODIUM PHOSPHATE, MONOBASIC, MONOHYDRATE SCH MLS/HR: 276; 142 INJECTION, SOLUTION INTRAVENOUS at 22:09

## 2018-01-26 RX ADMIN — POTASSIUM CHLORIDE SCH MEQ: 20 TABLET, EXTENDED RELEASE ORAL at 08:43

## 2018-01-26 RX ADMIN — SERTRALINE HYDROCHLORIDE SCH MG: 50 TABLET, FILM COATED ORAL at 08:44

## 2018-01-26 RX ADMIN — VANCOMYCIN HYDROCHLORIDE SCH MG: 500 INJECTION, POWDER, LYOPHILIZED, FOR SOLUTION INTRAVENOUS at 16:15

## 2018-01-26 RX ADMIN — Medication SCH TAB: at 08:43

## 2018-01-26 RX ADMIN — ATORVASTATIN CALCIUM SCH MG: 20 TABLET, FILM COATED ORAL at 21:00

## 2018-01-26 RX ADMIN — ACETAMINOPHEN PRN MG: 325 TABLET ORAL at 16:15

## 2018-01-26 RX ADMIN — PANTOPRAZOLE SCH MG: 40 TABLET, DELAYED RELEASE ORAL at 08:43

## 2018-01-26 RX ADMIN — CETIRIZINE HYDROCHLORIDE SCH MG: 10 TABLET, FILM COATED ORAL at 08:43

## 2018-01-26 RX ADMIN — VANCOMYCIN HYDROCHLORIDE SCH MG: 500 INJECTION, POWDER, LYOPHILIZED, FOR SOLUTION INTRAVENOUS at 22:01

## 2018-01-26 RX ADMIN — VANCOMYCIN HYDROCHLORIDE SCH MG: 500 INJECTION, POWDER, LYOPHILIZED, FOR SOLUTION INTRAVENOUS at 08:44

## 2018-01-26 RX ADMIN — I.V. FAT EMULSION SCH MLS/HR: 20 EMULSION INTRAVENOUS at 22:09

## 2018-01-26 RX ADMIN — VANCOMYCIN HYDROCHLORIDE SCH MG: 500 INJECTION, POWDER, LYOPHILIZED, FOR SOLUTION INTRAVENOUS at 12:52

## 2018-01-26 RX ADMIN — PHENYTOIN SODIUM SCH MLS/HR: 50 INJECTION INTRAMUSCULAR; INTRAVENOUS at 05:04

## 2018-01-26 RX ADMIN — SODIUM CHLORIDE SCH MLS/HR: 900 INJECTION, SOLUTION INTRAVENOUS at 22:07

## 2018-01-26 RX ADMIN — ACETAMINOPHEN PRN MG: 325 TABLET ORAL at 02:12

## 2018-01-26 RX ADMIN — ACETAMINOPHEN PRN MG: 325 TABLET ORAL at 08:44

## 2018-01-26 RX ADMIN — Medication SCH ML: at 08:44

## 2018-01-26 RX ADMIN — Medication SCH ML: at 21:00

## 2018-01-26 RX ADMIN — Medication SCH MG: at 08:43

## 2018-01-26 NOTE — HHI.PR
Subjective


Remarks


No BMs recorded overnight


Pt still very lethargic and abdomen is still distended


Pt states that she "just doesn't feel good"


Nurse reports that pt does not want to sit up in bed or move much at all. 


She complains of spasm-like abdominal discomfort





Objective


Vitals





Vital Signs








  Date Time  Temp Pulse Resp B/P (MAP) Pulse Ox O2 Delivery O2 Flow Rate FiO2


 


1/26/18 08:00 97.9 95 17 136/85 (102) 94   


 


1/26/18 04:37  95      


 


1/26/18 04:00 97.1 83 20 132/72 (92) 94   


 


1/26/18 00:00 97.2 92 20 127/70 (89) 95   


 


1/25/18 23:53  89      


 


1/25/18 20:02  97      


 


1/25/18 20:02  97      


 


1/25/18 20:00 97.1 98 20 128/71 (90) 95   


 


1/25/18 16:00 96.6 100 20 121/67 (85) 93   


 


1/25/18 12:00 96.5 99 18 129/73 (91) 95   








Result Diagram:  


1/26/18 0520                                                                   

             1/26/18 0520





Other Results





 Laboratory Tests








Test


  1/25/18


06:00 1/26/18


05:20


 


White Blood Count 21.7 TH/MM3  12.0 TH/MM3 


 


Red Blood Count 4.12 MIL/MM3  3.97 MIL/MM3 


 


Hemoglobin 11.6 GM/DL  11.2 GM/DL 


 


Hematocrit 35.2 %  33.5 % 


 


Mean Corpuscular Volume 85.5 FL  84.3 FL 


 


Mean Corpuscular Hemoglobin 28.2 PG  28.1 PG 


 


Mean Corpuscular Hemoglobin


Concent 33.0 % 


  33.3 % 


 


 


Red Cell Distribution Width 21.1 %  21.5 % 


 


Platelet Count 358 TH/MM3  288 TH/MM3 


 


Mean Platelet Volume 8.5 FL  7.9 FL 


 


Neutrophils (%) (Auto) 94.7 %  86.5 % 


 


Lymphocytes (%) (Auto) 1.4 %  5.1 % 


 


Monocytes (%) (Auto) 3.8 %  7.1 % 


 


Eosinophils (%) (Auto) 0.0 %  1.1 % 


 


Basophils (%) (Auto) 0.1 %  0.2 % 


 


Neutrophils # (Auto) 20.6 TH/MM3  10.4 TH/MM3 


 


Lymphocytes # (Auto) 0.3 TH/MM3  0.6 TH/MM3 


 


Monocytes # (Auto) 0.8 TH/MM3  0.8 TH/MM3 


 


Eosinophils # (Auto) 0.0 TH/MM3  0.1 TH/MM3 


 


Basophils # (Auto) 0.0 TH/MM3  0.0 TH/MM3 


 


CBC Comment AUTO DIFF  AUTO DIFF 


 


Differential Total Cells


Counted 100 


  100 


 


 


Neutrophils % (Manual) 60 %  54 % 


 


Band Neutrophils % 36 %  34 % 


 


Monocytes % 3 %  6 % 


 


Neutrophils # (Manual) 21.0 TH/MM3  10.8 TH/MM3 


 


Metamyelocytes 1 %  


 


Differential Comment


  FINAL DIFF


MANUAL FINAL DIFF


MANUAL


 


Toxic Vacuolation PRESENT  


 


Platelet Estimate NORMAL  NORMAL 


 


Platelet Morphology Comment NORMAL  NORMAL 


 


Meng Cells 1+  1+ 


 


Acanthocytes OCC  OCC 


 


Red Cell Morphology Comment   


 


Blood Urea Nitrogen 26 MG/DL  26 MG/DL 


 


Creatinine 0.85 MG/DL  0.56 MG/DL 


 


Random Glucose 124 MG/DL  123 MG/DL 


 


Total Protein 5.3 GM/DL  4.7 GM/DL 


 


Calcium Level 7.3 MG/DL  7.0 MG/DL 


 


Magnesium Level 2.1 MG/DL  


 


Sodium Level 134 MEQ/L  132 MEQ/L 


 


Potassium Level 4.7 MEQ/L  4.1 MEQ/L 


 


Chloride Level 103 MEQ/L  103 MEQ/L 


 


Carbon Dioxide Level 21.6 MEQ/L  22.6 MEQ/L 


 


Anion Gap 9 MEQ/L  6 MEQ/L 


 


Estimat Glomerular Filtration


Rate 64 ML/MIN 


  103 ML/MIN 


 


 


Protein Corrected Calcium 8.3 MG/DL  8.3 MG/DL 


 


Lymphocytes %  4 % 


 


Myelocytes  2 % 


 


Toxic Granulation  1+ 


 


Ovalocytes  1+ 








Imaging





Last Impressions








Abdomen/Pelvis CT 1/25/18 0000 Signed





Impressions: 





 Service Date/Time:  Thursday, January 25, 2018 10:25 - CONCLUSION:  1. 

Worsening 





 colonic distention. The colon is fluid filled. No significant inflammatory 





 process is seen involving the colon, however, pan colitis could have this 





 appearance. 2. Small volume ascites. 3. New small bilateral pleural effusions 





 and bibasilar atelectasis. 4. Cholelithiasis.     Theodore Duran Jr., MD 


 


Chest X-Ray 1/24/18 0000 Signed





Impressions: 





 Service Date/Time:  Wednesday, January 24, 2018 13:41 - CONCLUSION:  Minimal 





 progression of parenchymal changes left base..     Tanner Vann MD  FACR


 


Abdomen X-Ray 1/24/18 0000 Signed





Impressions: 





 Service Date/Time:  Wednesday, January 24, 2018 13:44 - CONCLUSION:  Gaseous 





 distention as described above.  Descending colon is gasless.  CT scan had 

shown 





 colitis. There is no free air.     Tanner Vann MD  FACR








Last Impressions








Chest X-Ray 1/24/18 0000 Signed





Impressions: 





 Service Date/Time:  Wednesday, January 24, 2018 13:41 - CONCLUSION:  Minimal 





 progression of parenchymal changes left base..     Tanner Vann MD  FACR


 


Abdomen X-Ray 1/24/18 0000 Signed





Impressions: 





 Service Date/Time:  Wednesday, January 24, 2018 13:44 - CONCLUSION:  Gaseous 





 distention as described above.  Descending colon is gasless.  CT scan had 

shown 





 colitis. There is no free air.     Tanner Vann MD  FACR


 


Abdomen/Pelvis CT 1/20/18 1840 Signed





Impressions: 





 Service Date/Time:  Saturday, January 20, 2018 20:06 - CONCLUSION:  1. Diffuse 





 colitis with slight inflammatory changes. There is also scattered 





 diverticulosis. No perforation. 2. Minimal ascites. 3. Calcified gallstones. 

4. 





 Hepatic and right renal densities likely cysts     Fritz Doyle MD 








Last Impressions








Chest X-Ray 1/22/18 0000 Signed





Impressions: 





 Service Date/Time:  Monday, January 22, 2018 09:33 - CONCLUSION:  1. Slightly 





 progressed left lower lobe airspace disease and associated trace pleural 





 effusion.     Anupam Sierra MD 


 


Abdomen/Pelvis CT 1/20/18 1840 Signed





Impressions: 





 Service Date/Time:  Saturday, January 20, 2018 20:06 - CONCLUSION:  1. Diffuse 





 colitis with slight inflammatory changes. There is also scattered 





 diverticulosis. No perforation. 2. Minimal ascites. 3. Calcified gallstones. 

4. 





 Hepatic and right renal densities likely cysts     Fritz Doyle MD 








Last Impressions








Chest X-Ray 1/20/18 2118 Signed





Impressions: 





 Service Date/Time:  Saturday, January 20, 2018 21:52 - CONCLUSION:  1. Left 





 basilar scarring. 2. No pneumonia.     Fritz Doyle MD 


 


Abdomen/Pelvis CT 1/20/18 1840 Signed





Impressions: 





 Service Date/Time:  Saturday, January 20, 2018 20:06 - CONCLUSION:  1. Diffuse 





 colitis with slight inflammatory changes. There is also scattered 





 diverticulosis. No perforation. 2. Minimal ascites. 3. Calcified gallstones. 

4. 





 Hepatic and right renal densities likely cysts     Fritz Doyle MD 








Objective Remarks


General: NAD, lethargic


Chest: CTA


Cardiac: Tachy, regular


Abd: Minimal BS, soft, distended, nontender


Ext: No edema





A/P


Problem List:  


(1) C. difficile colitis


ICD Codes:  A04.72 - Enterocolitis due to Clostridium difficile, not specified 

as recurrent


Plan:  


- Pt is an 84 y/o female who presented to the ED with complaints of multiple 

episodes of diarrhea for 4 days prior to admission associated with left sided 

abdominal pain. 


 Patient describes the pain as a intermittent cramping sensation moderate in 

severity. No recent antibiotic use or evidence of bleeding in the stool.  


- CT of abdomen (1/20/18) --> Diffuse colitis with slight inflammatory changes.

  There is also scattered diverticulosis.  No perforation.  Mild ascites.  Also 

had gallstones. 


  Hepatic and right renal disease likely cyst.


- Stool positive for C. difficile patient started on Flagyl 500mg po Q8H on 1/21 /18


- Diarrhea improving


- KUB (1/24) --> Gaseous distention of the transverse colon to 8cm.  Descending 

colon is gasless.  CT scan had shown colitis. There is no free air.  


- Noncontrasted CT Abd/pelvis (1/25) -->  Worsening colonic distention. The 

colon is fluid filled. No significant inflammatory process is seen involving 

the colon, however, 


 pan colitis could have this appearance. Small volume ascites. New small 

bilateral pleural effusions and bibasilar atelectasis.  Cholelithiasis.


- Vancomycin was added on 1/25


- GI consulted


- WBC count improved from 21.7 with 36% bands on 1/25 to 12.0 with 34% bands on 

1/26


- The case was discussed with the pts son on 1/25 who is flying into town today


- Pt is a full code per the pt at this time


- Supportive care


- Physical therapy recommends rehab at time of DC








(2) Hypokalemia


ICD Codes:  E87.6 - Hypokalemia


Status:  Resolved


Plan:  


- Improved with replacement


- Potassium 2.6 on admission -> 4.8 (1/21) -> 4.1 (1/22) --> 4.5 (1/24) --> 4.7 

(1/25)


- Magnesium 2.1 (1/25)





(3) Dehydration


ICD Codes:  E86.0 - Dehydration


Plan:  


- BUN 32, creatinine 0.91, estimated GFR 59 on admission


- Dehydration likely secondary to diarrhea


- Pt has been on IV fluids but she has CHF and need to monitor for ss of fluid 

overload


- CXR requested (1/22) --> Slightly progressed left lower lobe airspace disease 

and associated trace pleural effusion.  


- Labs on 1/24 with BUn 20, Cr 0.50, 


- On 1/23 telemetry reveals ST in the 110s


- Pt was started on PPN on 1/24 as she has not been eating anything


- Continue to monitor telemetry





(4) Possible urinary tract infection


ICD Codes:  R39.89 - Other symptoms and signs involving the genitourinary system


Plan:  


- Urinalysis on 1/21 reviewed and reveals positive nitrates small amount of 

leukocyte esterase culture indicated and pending


- Urine culture reveals: Citrobacter Amalonaticus sensitive to Rocephin.  


- Pt was started on Rocephin IV on 1/23


- Repeat urine culture is pending





(5) Anxiety


ICD Codes:  F41.9 - Anxiety disorder, unspecified


Status:  Chronic


Plan:  


- Continue patient's home Zoloft 150 mg by mouth daily next line 


- Try to minimize Xanax 0.25 mg by mouth 3 times a day as needed for anxiety





(6) Aortic valve stenosis, severe


ICD Codes:  I35.0 - Nonrheumatic aortic (valve) stenosis


Status:  Acute


Plan:  


- Previous admission from 8/18-8/21 for SOB and felt to have acute CHF symptoms 

related to severe AVS. 


- 2D echocardiogram (8/19/17) which noted severe aortic valve stenosis and she 

was initially decompensated. 


- She was seen by CTS who did the initial evaluation for TAVR vs traditional 

AVR but due to her issues with GIB this has been put on hold.


- She is no longer on any anticoagulants


- Patient appears compensated at this point 





(7) CHF (congestive heart failure)


ICD Codes:  I50.9 - Heart failure, unspecified


Status:  Acute


Plan:  


- Patient is regularly on Lasix 20 mg by mouth daily but this was held 

secondary to dehydration


- Once patient is hydrated and diarrhea is improved may need to resume





- 2-D echocardiogram (8/19/2017)


   - Normal left ventricular size, Mild concentric left ventricular hypertrophy


   - Estimated ejection fraction 55-60%


   - Severe aortic valve stenosis


   - AV mean gradient 44.0 mmHg


   - Mild aortic valve regurgitation


   - Mild/moderate mitral valve regurgitation


   - Mitral annular calcification is present


   - Severe thickening of the aortic valve leaflets


   - There is mild tricuspid valve regurgitation


   - There's estimated pulmonary hypertension present range of 40-50 mmHg


   - Pulmonary valve is not well visualized.





(8) Hyperlipidemia


ICD Codes:  E78.5 - Hyperlipidemia, unspecified


Status:  Chronic


Plan:  


- Cont. patient's home atorvastatin 20mg daily at bedtime





Assessment and Plan


Patient examined.


Assessment and plan formulated with Kathryn Mcgowan PA-C.


I agree with the above.


c.diff colitis


colonic ileus


poor po intake/general weakness


cont flagyl/vanco


gi following


follow colon diameter.


updated son at bedside


guarded. could deteriorate. but full code.











Kathryn Mcgowan Jan 26, 2018 10:52


Jaxson Rose MD Jan 26, 2018 16:02

## 2018-01-26 NOTE — RADRPT
EXAM DATE/TIME:  01/26/2018 15:13 

 

[facility] COMPARISON:     

ABDOMEN KUB ONLY, January 24, 2018, 13:44.

 

                     

INDICATIONS :     

Abdominal pain for six days. 

                     

 

MEDICAL HISTORY :            

Congestive heart failure. Hypertension. Anemia   

 

SURGICAL HISTORY :        

Hysterectomy.

 

ENCOUNTER:     

Initial                                        

 

ACUITY:     

4 - 6 days      

 

PAIN SCORE:     

8/10

 

LOCATION:     

Bilateral lower quadrant 

 

FINDINGS:     

2 supine views the abdomen show persistent dilatation of the transverse colon and ascending colon as 
well as multiple small bowel loops. Descending colon is nondilated. The transverse colon reaches a ma
ximum diameter of 7 cm. Overall the appearance is similar to the prior study. A scoliotic and degener
ative spine noted. 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

 

CONCLUSION:     

Largely unchanged dilatation of the colon and small bowel.

 

 

 

 Theodore Duran Jr., MD on January 26, 2018 at 15:50           

Board Certified Radiologist.

 This report was verified electronically.

## 2018-01-27 VITALS
HEART RATE: 106 BPM | RESPIRATION RATE: 36 BRPM | DIASTOLIC BLOOD PRESSURE: 48 MMHG | TEMPERATURE: 97.1 F | OXYGEN SATURATION: 85 % | SYSTOLIC BLOOD PRESSURE: 128 MMHG

## 2018-01-27 VITALS
SYSTOLIC BLOOD PRESSURE: 97 MMHG | HEART RATE: 120 BPM | OXYGEN SATURATION: 94 % | RESPIRATION RATE: 20 BRPM | TEMPERATURE: 95.4 F | DIASTOLIC BLOOD PRESSURE: 57 MMHG

## 2018-01-27 VITALS
DIASTOLIC BLOOD PRESSURE: 59 MMHG | HEART RATE: 97 BPM | OXYGEN SATURATION: 92 % | RESPIRATION RATE: 18 BRPM | SYSTOLIC BLOOD PRESSURE: 107 MMHG | TEMPERATURE: 97.5 F

## 2018-01-27 VITALS
SYSTOLIC BLOOD PRESSURE: 129 MMHG | HEART RATE: 97 BPM | DIASTOLIC BLOOD PRESSURE: 70 MMHG | RESPIRATION RATE: 18 BRPM | TEMPERATURE: 96.9 F | OXYGEN SATURATION: 92 %

## 2018-01-27 LAB
BASOPHILS # BLD AUTO: 0 TH/MM3 (ref 0–0.2)
BASOPHILS NFR BLD: 0.2 % (ref 0–2)
DACRYOCYTES BLD QL SMEAR: (no result)
EOSINOPHIL # BLD: 0 TH/MM3 (ref 0–0.4)
EOSINOPHIL NFR BLD: 0.1 % (ref 0–4)
ERYTHROCYTE [DISTWIDTH] IN BLOOD BY AUTOMATED COUNT: 21.3 % (ref 11.6–17.2)
HCT VFR BLD CALC: 41.7 % (ref 35–46)
HGB BLD-MCNC: 13.9 GM/DL (ref 11.6–15.3)
LYMPHOCYTES # BLD AUTO: 0.7 TH/MM3 (ref 1–4.8)
LYMPHOCYTES NFR BLD AUTO: 12.7 % (ref 9–44)
LYMPHOCYTES: 4 % (ref 9–44)
MCH RBC QN AUTO: 27.9 PG (ref 27–34)
MCHC RBC AUTO-ENTMCNC: 33.2 % (ref 32–36)
MCV RBC AUTO: 84 FL (ref 80–100)
METAMYELOCYTES NFR BLD: 2 % (ref 0–1)
MONOCYTE #: 0.1 TH/MM3 (ref 0–0.9)
MONOCYTES NFR BLD: 1.7 % (ref 0–8)
MONOCYTES: 6 % (ref 0–8)
MYELOCYTES NFR BLD: 1 % (ref 0–0)
NEUTROPHILS # BLD AUTO: 4.6 TH/MM3 (ref 1.8–7.7)
NEUTROPHILS NFR BLD AUTO: 85.3 % (ref 16–70)
NEUTS BAND # BLD MANUAL: 4.8 TH/MM3 (ref 1.8–7.7)
NEUTS BAND NFR BLD: 32 % (ref 0–6)
NEUTS SEG NFR BLD MANUAL: 54 % (ref 16–70)
OVALOCYTES BLD QL SMEAR: (no result)
PLATELET # BLD: 344 TH/MM3 (ref 150–450)
PMV BLD AUTO: 8.3 FL (ref 7–11)
RBC # BLD AUTO: 4.96 MIL/MM3 (ref 4–5.3)
TOXIC GRANULES BLD QL SMEAR: (no result)
WBC # BLD AUTO: 5.4 TH/MM3 (ref 4–11)

## 2018-01-27 RX ADMIN — PANTOPRAZOLE SCH MG: 40 TABLET, DELAYED RELEASE ORAL at 09:07

## 2018-01-27 RX ADMIN — VANCOMYCIN HYDROCHLORIDE SCH MG: 500 INJECTION, POWDER, LYOPHILIZED, FOR SOLUTION INTRAVENOUS at 11:36

## 2018-01-27 RX ADMIN — VANCOMYCIN HYDROCHLORIDE SCH MG: 500 INJECTION, POWDER, LYOPHILIZED, FOR SOLUTION INTRAVENOUS at 09:07

## 2018-01-27 RX ADMIN — ONDANSETRON PRN MG: 4 TABLET, ORALLY DISINTEGRATING ORAL at 09:06

## 2018-01-27 RX ADMIN — SODIUM CHLORIDE SCH MLS/HR: 900 INJECTION, SOLUTION INTRAVENOUS at 11:37

## 2018-01-27 RX ADMIN — SERTRALINE HYDROCHLORIDE SCH MG: 50 TABLET, FILM COATED ORAL at 09:07

## 2018-01-27 RX ADMIN — CETIRIZINE HYDROCHLORIDE SCH MG: 10 TABLET, FILM COATED ORAL at 09:07

## 2018-01-27 RX ADMIN — Medication SCH TAB: at 09:07

## 2018-01-27 RX ADMIN — ACETAMINOPHEN PRN MG: 325 TABLET ORAL at 06:06

## 2018-01-27 RX ADMIN — VITAMIN C SCH TAB: TAB at 09:07

## 2018-01-27 RX ADMIN — Medication SCH MG: at 09:07

## 2018-01-27 RX ADMIN — Medication SCH ML: at 09:00

## 2018-01-27 RX ADMIN — SODIUM CHLORIDE SCH MLS/HR: 900 INJECTION, SOLUTION INTRAVENOUS at 06:16

## 2018-01-27 RX ADMIN — POTASSIUM CHLORIDE SCH MEQ: 20 TABLET, EXTENDED RELEASE ORAL at 09:07

## 2018-01-27 NOTE — RADRPT
EXAM DATE/TIME:  01/27/2018 09:23 

 

[facility] COMPARISON:     

CHEST SINGLE AP, January 24, 2018, 13:41.

 

                     

INDICATIONS :     

Shortness of breath and chest pain.

                     

 

MEDICAL HISTORY :            

Congestive heart failure. Hypertension. Anemia   

 

SURGICAL HISTORY :     

Hysterectomy.   

 

ENCOUNTER:     

Subsequent                                        

 

ACUITY:     

1 week      

 

PAIN SCORE:     

5/10

 

LOCATION:     

Bilateral chest 

 

FINDINGS:     

Single AP semiupright portable view of the chest demonstrates obscuration of the left hemidiaphragm, 
stable consistent with left lower lobe atelectasis versus consolidation or fluid. The lungs are other
wise clear. Heart size is normal with atherosclerosis within the aortic arch. Pulmonary vessels are n
ormal in caliber. There is a lucency underlying the right hemidiaphragm consistent with overlying bow
el.

 

CONCLUSION:     

Persistent left lower lobe atelectasis versus consolidation. No new abnormality seen.

 

 

 

 Kaia Rojas MD on January 27, 2018 at 10:07           

Board Certified Radiologist.

 This report was verified electronically.

## 2018-01-27 NOTE — RADRPT
EXAM DATE/TIME:  01/27/2018 05:10 

 

[facility] COMPARISON:     

ABDOMEN KUB ONLY, January 26, 2018, 15:13.

 

                     

INDICATIONS :     

Ileus.

                     

 

MEDICAL HISTORY :            

Congestive heart failure. Hypertension. Anemia   

 

SURGICAL HISTORY :        

Hysterectomy.

 

ENCOUNTER:     

Subsequent                                        

 

ACUITY:     

1 week      

 

PAIN SCORE:     

8/10

 

LOCATION:       

inferior abdomen

 

FINDINGS:     

Supine view of the abdomen was performed.  There is air seen within distended small bowel throughout 
the abdomen. Air seen within distended ascending and transverse colon. Significant air is not seen wi
thin the descending colon or rectum. Free air is not seen. There is degenerative change in the lumbar
 spine. 

 

CONCLUSION:     

Distended bowel representing either ileus or distal obstruction.

 

 

 

 Steve Ontiveros MD on January 27, 2018 at 6:23           

Board Certified Radiologist.

 This report was verified electronically.

## 2018-01-27 NOTE — HHI.PR
Subjective


Remarks


Pt having increased abd pain today


Pt much more anxious this morning


She has not had any BMs in 2 days, no flatus


Pt feels nauseated and feels SOB but O2 sats are stable





Objective


Vitals





Vital Signs








  Date Time  Temp Pulse Resp B/P (MAP) Pulse Ox O2 Delivery O2 Flow Rate FiO2


 


1/27/18 08:00 95.4 120 20 97/57 (70) 94   


 


1/27/18 04:45 96.9 97 18 129/70 (89) 92   


 


1/27/18 00:10 97.5 97 18 107/59 (75) 92   


 


1/26/18 20:00 97.2 107 18 116/69 (85) 93   


 


1/26/18 12:00 96.8 99 17 131/77 (95) 93   








Result Diagram:  


1/27/18 0735                                                                   

             1/26/18 0520





Other Results





 Laboratory Tests








Test


  1/26/18


05:20 1/27/18


07:35


 


White Blood Count 12.0 TH/MM3  5.4 TH/MM3 


 


Red Blood Count 3.97 MIL/MM3  4.96 MIL/MM3 


 


Hemoglobin 11.2 GM/DL  13.9 GM/DL 


 


Hematocrit 33.5 %  41.7 % 


 


Mean Corpuscular Volume 84.3 FL  84.0 FL 


 


Mean Corpuscular Hemoglobin 28.1 PG  27.9 PG 


 


Mean Corpuscular Hemoglobin


Concent 33.3 % 


  33.2 % 


 


 


Red Cell Distribution Width 21.5 %  21.3 % 


 


Platelet Count 288 TH/MM3  344 TH/MM3 


 


Mean Platelet Volume 7.9 FL  8.3 FL 


 


Neutrophils (%) (Auto) 86.5 %  85.3 % 


 


Lymphocytes (%) (Auto) 5.1 %  12.7 % 


 


Monocytes (%) (Auto) 7.1 %  1.7 % 


 


Eosinophils (%) (Auto) 1.1 %  0.1 % 


 


Basophils (%) (Auto) 0.2 %  0.2 % 


 


Neutrophils # (Auto) 10.4 TH/MM3  4.6 TH/MM3 


 


Lymphocytes # (Auto) 0.6 TH/MM3  0.7 TH/MM3 


 


Monocytes # (Auto) 0.8 TH/MM3  0.1 TH/MM3 


 


Eosinophils # (Auto) 0.1 TH/MM3  0.0 TH/MM3 


 


Basophils # (Auto) 0.0 TH/MM3  0.0 TH/MM3 


 


CBC Comment AUTO DIFF  AUTO DIFF 


 


Differential Total Cells


Counted 100 


  100 


 


 


Neutrophils % (Manual) 54 %  54 % 


 


Band Neutrophils % 34 %  32 % 


 


Lymphocytes % 4 %  4 % 


 


Monocytes % 6 %  6 % 


 


Neutrophils # (Manual) 10.8 TH/MM3  4.8 TH/MM3 


 


Myelocytes 2 %  1 % 


 


Differential Comment


  FINAL DIFF


MANUAL FINAL DIFF


MANUAL


 


Toxic Granulation 1+  2+ 


 


Platelet Estimate NORMAL  NORMAL 


 


Platelet Morphology Comment NORMAL  NORMAL 


 


Ovalocytes 1+  1+ 


 


Meng Cells 1+  


 


Acanthocytes OCC  


 


Blood Urea Nitrogen 26 MG/DL  


 


Creatinine 0.56 MG/DL  


 


Random Glucose 123 MG/DL  


 


Total Protein 4.7 GM/DL  


 


Calcium Level 7.0 MG/DL  


 


Sodium Level 132 MEQ/L  


 


Potassium Level 4.1 MEQ/L  


 


Chloride Level 103 MEQ/L  


 


Carbon Dioxide Level 22.6 MEQ/L  


 


Anion Gap 6 MEQ/L  


 


Estimat Glomerular Filtration


Rate 103 ML/MIN 


  


 


 


Protein Corrected Calcium 8.3 MG/DL  


 


Eosinophils %  1 % 


 


Metamyelocytes  2 % 


 


Tear Drop Cells  1+ 








Imaging





Last Impressions








Abdomen X-Ray 1/27/18 0600 Signed





Impressions: 





 Service Date/Time:  Saturday, January 27, 2018 05:10 - CONCLUSION:  Distended 





 bowel representing either ileus or distal obstruction.     Steve Ontiveros MD 


 


Abdomen/Pelvis CT 1/25/18 0000 Signed





Impressions: 





 Service Date/Time:  Thursday, January 25, 2018 10:25 - CONCLUSION:  1. 

Worsening 





 colonic distention. The colon is fluid filled. No significant inflammatory 





 process is seen involving the colon, however, pan colitis could have this 





 appearance. 2. Small volume ascites. 3. New small bilateral pleural effusions 





 and bibasilar atelectasis. 4. Cholelithiasis.     Theodore Duran Jr., MD 


 


Chest X-Ray 1/24/18 0000 Signed





Impressions: 





 Service Date/Time:  Wednesday, January 24, 2018 13:41 - CONCLUSION:  Minimal 





 progression of parenchymal changes left base..     Tanner Vann MD  FACR








Last Impressions








Abdomen/Pelvis CT 1/25/18 0000 Signed





Impressions: 





 Service Date/Time:  Thursday, January 25, 2018 10:25 - CONCLUSION:  1. 

Worsening 





 colonic distention. The colon is fluid filled. No significant inflammatory 





 process is seen involving the colon, however, pan colitis could have this 





 appearance. 2. Small volume ascites. 3. New small bilateral pleural effusions 





 and bibasilar atelectasis. 4. Cholelithiasis.     Theodore Duran Jr., MD 


 


Chest X-Ray 1/24/18 0000 Signed





Impressions: 





 Service Date/Time:  Wednesday, January 24, 2018 13:41 - CONCLUSION:  Minimal 





 progression of parenchymal changes left base..     Tanner Vann MD  FACR


 


Abdomen X-Ray 1/24/18 0000 Signed





Impressions: 





 Service Date/Time:  Wednesday, January 24, 2018 13:44 - CONCLUSION:  Gaseous 





 distention as described above.  Descending colon is gasless.  CT scan had 

shown 





 colitis. There is no free air.     Tanner Vann MD  FACR








Last Impressions








Chest X-Ray 1/24/18 0000 Signed





Impressions: 





 Service Date/Time:  Wednesday, January 24, 2018 13:41 - CONCLUSION:  Minimal 





 progression of parenchymal changes left base..     Tanner Vann MD  FACR


 


Abdomen X-Ray 1/24/18 0000 Signed





Impressions: 





 Service Date/Time:  Wednesday, January 24, 2018 13:44 - CONCLUSION:  Gaseous 





 distention as described above.  Descending colon is gasless.  CT scan had 

shown 





 colitis. There is no free air.     Tanner Vann MD  FACR


 


Abdomen/Pelvis CT 1/20/18 1840 Signed





Impressions: 





 Service Date/Time:  Saturday, January 20, 2018 20:06 - CONCLUSION:  1. Diffuse 





 colitis with slight inflammatory changes. There is also scattered 





 diverticulosis. No perforation. 2. Minimal ascites. 3. Calcified gallstones. 

4. 





 Hepatic and right renal densities likely cysts     Fritz Doyle MD 








Last Impressions








Chest X-Ray 1/22/18 0000 Signed





Impressions: 





 Service Date/Time:  Monday, January 22, 2018 09:33 - CONCLUSION:  1. Slightly 





 progressed left lower lobe airspace disease and associated trace pleural 





 effusion.     Anupam Sierra MD 


 


Abdomen/Pelvis CT 1/20/18 1840 Signed





Impressions: 





 Service Date/Time:  Saturday, January 20, 2018 20:06 - CONCLUSION:  1. Diffuse 





 colitis with slight inflammatory changes. There is also scattered 





 diverticulosis. No perforation. 2. Minimal ascites. 3. Calcified gallstones. 

4. 





 Hepatic and right renal densities likely cysts     Fritz Doyle MD 








Last Impressions








Chest X-Ray 1/20/18 2118 Signed





Impressions: 





 Service Date/Time:  Saturday, January 20, 2018 21:52 - CONCLUSION:  1. Left 





 basilar scarring. 2. No pneumonia.     Fritz Doyle MD 


 


Abdomen/Pelvis CT 1/20/18 1840 Signed





Impressions: 





 Service Date/Time:  Saturday, January 20, 2018 20:06 - CONCLUSION:  1. Diffuse 





 colitis with slight inflammatory changes. There is also scattered 





 diverticulosis. No perforation. 2. Minimal ascites. 3. Calcified gallstones. 

4. 





 Hepatic and right renal densities likely cysts     Fritz Doyle MD 








Objective Remarks


General: Anxious appearing female


Chest: CTA 


Cardiac: Tachy, regular


Abd: Absent BS, semi-firm, distended


Ext: Mild LE edema





A/P


Problem List:  


(1) C. difficile colitis


ICD Codes:  A04.72 - Enterocolitis due to Clostridium difficile, not specified 

as recurrent


Plan:  


- Pt is an 84 y/o female who presented to the ED with complaints of multiple 

episodes of diarrhea for 4 days prior to admission associated with left sided 

abdominal pain. 


 Patient describes the pain as a intermittent cramping sensation moderate in 

severity. No recent antibiotic use or evidence of bleeding in the stool.  


- CT of abdomen (1/20/18) --> Diffuse colitis with slight inflammatory changes.

  There is also scattered diverticulosis.  No perforation.  Mild ascites.  Also 

had gallstones. 


  Hepatic and right renal disease likely cyst.


- Stool positive for C. difficile patient started on Flagyl 500mg po Q8H on 1/21 /18


- Diarrhea improving


- KUB (1/24) --> Gaseous distention of the transverse colon to 8cm.  Descending 

colon is gasless.  CT scan had shown colitis. There is no free air.  


- Noncontrasted CT Abd/pelvis (1/25) -->  Worsening colonic distention. The 

colon is fluid filled. No significant inflammatory process is seen involving 

the colon, however, 


 pan colitis could have this appearance. Small volume ascites. New small 

bilateral pleural effusions and bibasilar atelectasis.  Cholelithiasis.


- Vancomycin was added on 1/25


- GI following


- WBC count improved from 21.7 with 36% bands on 1/25 to 5.2 with 32% bands on 1 /27


- KUB (1/27) --> Distended bowel representing either ileus or distal 

obstruction.


- Pt is very anxious today and abd is more painful and distended


- I spoke with Dr. Rubio this morning regarding the pts status and he is 

requesting repeat CT scan of the abd/pelvis to r/o perforation and requesting 

General Surgery


 consultation as well. 


- Add on Simethicone


- Her vitals are somewhat concerning this morning with hypotension, tachycardia 

and low temp


- Will repeat evaluation following CT scan today


- Pt is a full code per the pt at this time, may need to consider transfer to 

ICU. Will re-evaluate in a few hours


- Supportive care


- Physical therapy recommends rehab at time of DC





ADDENDUM:


- Pt re-evaluated at 10:30AM and she is more painful and having increased work 

of breathing. 


- Discussed with the pt and son transfer to ICU and the possibility of needing 

to be placed on mechanical ventilator. 


- Pt states that she DOES NOT want to be placed on mechanical vent and just 

wants pain relief and comfort measures. 


- Pt asked several times about continued aggressive measures and code status 

and pt wants DNR and comfort care


- We will give Lasix 40mg IV x one dose and Duoneb


- We will continue antibiotics 


- Increase pain control with Dilaudid PRN and Ativan PRN and explained that 

this may worsen her ileus and the pt and son understand this. 


- Hold PPN for now


- We will reassess later today





(2) Hypokalemia


ICD Codes:  E87.6 - Hypokalemia


Status:  Resolved


Plan:  


- Improved with replacement


- Potassium 2.6 on admission -> 4.8 (1/21) -> 4.1 (1/22) --> 4.5 (1/24) --> 4.7 

(1/25)


- Magnesium 2.1 (1/25)





(3) Dehydration


ICD Codes:  E86.0 - Dehydration


Plan:  


- BUN 32, creatinine 0.91, estimated GFR 59 on admission


- Dehydration likely secondary to diarrhea


- Pt has been on IV fluids but she has CHF and need to monitor for ss of fluid 

overload


- CXR requested (1/22) --> Slightly progressed left lower lobe airspace disease 

and associated trace pleural effusion.  


- Labs on 1/24 with BUn 20, Cr 0.50, 


- On 1/23 telemetry reveals ST in the 110s


- Pt was started on PPN on 1/24 as she has not been eating anything


- Continue to monitor telemetry





(4) Possible urinary tract infection


ICD Codes:  R39.89 - Other symptoms and signs involving the genitourinary system


Plan:  


- Urinalysis on 1/21 reviewed and reveals positive nitrates small amount of 

leukocyte esterase culture indicated and pending


- Urine culture reveals: Citrobacter Amalonaticus sensitive to Rocephin.  


- Pt was started on Rocephin IV on 1/23


- Repeat urine culture is pending





(5) Anxiety


ICD Codes:  F41.9 - Anxiety disorder, unspecified


Status:  Chronic


Plan:  


- Continue patient's home Zoloft 150 mg by mouth daily next line 


- Increase her Xanax to 0.5 mg by mouth 3 times a day as needed for anxiety





(6) Aortic valve stenosis, severe


ICD Codes:  I35.0 - Nonrheumatic aortic (valve) stenosis


Status:  Acute


Plan:  


- Previous admission from 8/18-8/21 for SOB and felt to have acute CHF symptoms 

related to severe AVS. 


- 2D echocardiogram (8/19/17) which noted severe aortic valve stenosis and she 

was initially decompensated. 


- She was seen by CTS who did the initial evaluation for TAVR vs traditional 

AVR but due to her issues with GIB this has been put on hold.


- She is no longer on any anticoagulants





(7) CHF (congestive heart failure)


ICD Codes:  I50.9 - Heart failure, unspecified


Status:  Acute


Plan:  


- Patient is regularly on Lasix 20 mg by mouth daily but this was held 

secondary to dehydration


- Once patient is hydrated and diarrhea is improved may need to resume


- On 1/27 pt noted that her melchor is increasing likely related to IVF and PPN 

will get a CXR today





- 2-D echocardiogram (8/19/2017)


   - Normal left ventricular size, Mild concentric left ventricular hypertrophy


   - Estimated ejection fraction 55-60%


   - Severe aortic valve stenosis


   - AV mean gradient 44.0 mmHg


   - Mild aortic valve regurgitation


   - Mild/moderate mitral valve regurgitation


   - Mitral annular calcification is present


   - Severe thickening of the aortic valve leaflets


   - There is mild tricuspid valve regurgitation


   - There's estimated pulmonary hypertension present range of 40-50 mmHg


   - Pulmonary valve is not well visualized.





(8) Hyperlipidemia


ICD Codes:  E78.5 - Hyperlipidemia, unspecified


Status:  Chronic


Plan:  


- Cont. patient's home atorvastatin 20mg daily at bedtime





Assessment and Plan


Patient examined.


Assessment and plan formulated with Kathryn Mcgowan PA-C.


I agree with the above.


Arrived at bedside pt in respiratory distress. wheeze/rhonci


abdomen breathing. She is oriented. son is present. nurses and Nisa GORDON


present. Pt was asked about moving to ICU and life support machine if she 

continues


to deteriorate. She quickly answered "NO"...I asked her at least 5 more times 

and she


clearly only wants comfort medications at this point and is ready to die. I 

informed


her that she will likely die without further intervention and she understood.


will order comfort meds plus nebs/lasix. son reports sister on way and in 

flight from [place].











Kathryn Mcgowan Jan 27, 2018 09:30


Jaxson Rose MD Jan 27, 2018 11:00

## 2018-01-27 NOTE — HHI.GIFU
Subjective


Remarks


skin color pale, mild diaphoretic, pale 


100% Non rebreather on


Son in  . 


Patient responds very minimal


 (Noelle Dickens)





Objective


Vitals I&O





Vital Signs








  Date Time  Temp Pulse Resp B/P (MAP) Pulse Ox O2 Delivery O2 Flow Rate FiO2


 


1/27/18 08:00 95.4 120 20 97/57 (70) 94   


 


1/27/18 04:45 96.9 97 18 129/70 (89) 92   


 


1/27/18 00:10 97.5 97 18 107/59 (75) 92   


 


1/26/18 20:00 97.2 107 18 116/69 (85) 93   


 


1/26/18 12:00 96.8 99 17 131/77 (95) 93   














I/O      


 


 1/26/18 1/26/18 1/26/18 1/27/18 1/27/18 1/27/18





 07:00 15:00 23:00 07:00 15:00 23:00


 


Intake Total 120 ml  4242 ml   


 


Output Total 1180 ml  600 ml 500 ml  


 


Balance -1060 ml  3642 ml -500 ml  


 


      


 


Intake Oral 120 ml  240 ml   


 


IV Total   4002 ml   


 


Output Urine Total 1180 ml  600 ml 500 ml  


 


# Bowel Movements 0  0   








Laboratory





Laboratory Tests








Test


  1/27/18


07:35


 


White Blood Count 5.4 


 


Red Blood Count 4.96 


 


Hemoglobin 13.9 


 


Hematocrit 41.7 


 


Mean Corpuscular Volume 84.0 


 


Mean Corpuscular Hemoglobin 27.9 


 


Mean Corpuscular Hemoglobin


Concent 33.2 


 


 


Red Cell Distribution Width 21.3 


 


Platelet Count 344 


 


Mean Platelet Volume 8.3 


 


Neutrophils (%) (Auto) 85.3 


 


Lymphocytes (%) (Auto) 12.7 


 


Monocytes (%) (Auto) 1.7 


 


Eosinophils (%) (Auto) 0.1 


 


Basophils (%) (Auto) 0.2 


 


Neutrophils # (Auto) 4.6 


 


Lymphocytes # (Auto) 0.7 


 


Monocytes # (Auto) 0.1 


 


Eosinophils # (Auto) 0.0 


 


Basophils # (Auto) 0.0 


 


CBC Comment AUTO DIFF 


 


Differential Total Cells


Counted 100 


 


 


Neutrophils % (Manual) 54 


 


Band Neutrophils % 32 


 


Lymphocytes % 4 


 


Monocytes % 6 


 


Eosinophils % 1 


 


Neutrophils # (Manual) 4.8 


 


Metamyelocytes 2 


 


Myelocytes 1 


 


Differential Comment


  FINAL DIFF


MANUAL


 


Toxic Granulation 2+ 


 


Platelet Estimate NORMAL 


 


Platelet Morphology Comment NORMAL 


 


Tear Drop Cells 1+ 


 


Ovalocytes 1+ 














 Date/Time


Source Procedure


Growth Status


 


 


 1/20/18 21:45


Stool Stool - Final


NO ENTERIC PATHOGENS DETECTED BY PCR... Complete


 


 1/23/18 04:20


Urine Catheterized Urine Urine Culture - Final


,000 CFU/ML MIXED MERARI... Complete








Physical Exam


HEENT: Normocephalic; atraumatic


CHEST:  Tachypneic , 100 percent nonrebreather


CARDIAC:  RRR


ABDOMEN:  Distended, firm, diffuse TTP, bowel sounds very minimal


SKIN:  Diaphoretic 


CNS: Minimal response opens eyes to verbal, but not speaking at this time


 (Noelle Dickens)





Assessment and Plan


Assessment:  


(1) C. difficile colitis


ICD Codes:  A04.72 - Enterocolitis due to Clostridium difficile, not specified 

as recurrent


(2) Gastritis


ICD Codes:  K29.70 - Gastritis, unspecified, without bleeding


Status:  Acute


(3) Esophagitis


ICD Codes:  K20.9 - Esophagitis, unspecified


Status:  Acute


(4) Hiatal hernia


ICD Codes:  K44.9 - Diaphragmatic hernia without obstruction or gangrene


Status:  Acute


Plan


C. difficile colitis, continues to progress into a multisystem critical state


Gastritis


Abdominal distention worsening, minimal bowel sounds, abdomen taut semifirm


Tachypnea respirations around 40 a minute, O2 100% nonrebreather mask


Son is here and is requesting patient to be DO NOT RESUSCITATE, no code.  

Initially we were ready for Gen. surgery consult and further testing that he 

states D escalate care


His sister he is flying in this a.m..  At this time he wants her comfort to be 

the main focus.





Plan


GI has offered supportive care, but will no longer be needed on this case. 


Spoke to son and offered any assistance that he request today if needed





Patient is being seen by myself and Dr. Rubio, it was written on his behalf


 (Noelle Dickens)


Plan


Agree with above-noted, patient is deteriorating, she and her son would like to 

proceed with comfort care only and no more procedures, continue supportive care

, consider palliative care, we will sign off at this time


 (Birdie Rubio MD)











Noelle Dickens Jan 27, 2018 11:16


Birdie Rubio MD Jan 27, 2018 16:41

## 2023-02-20 NOTE — MH
cc:

REI JIANG M.D.

****

 

DATE OF ADMISSION

8/19/2017

 

ADMISSION DIAGNOSIS

Congestive heart failure, pulmonary edema.

 

HISTORY OF PRESENT ILLNESS

The patient is a very pleasant 84-year-old female well known to me for a prior

admission for recurrent GI bleeds. Over the several months she has had

admissions for profound anemia secondary to GI bleed of which the source is

unknown despite colonoscopy, endoscopy, pill endoscopy. The patient typically

when she becomes anemic starts complaining of weakness and cough at which time

an evaluation her hemoglobin is usually found to be decreased and she is

treated with supportive care and transfusions. On prior admission she developed

some volume overload during the transfusion.  She also had an elevation of her

troponin at that time. She was seen by cardiology and secondary to her profound

anemia of hemoglobin of 4 it was felt that her cardiac evaluation could take

place once she was stabilized in terms of her anemia and possibly determining

the source of her GI bleed. At that time an echocardiogram was also done that

showed a normal ejection fraction, however, it was read as having severe aortic

stenosis.  The patient has remained fairly stable with her anemia with

hemoglobins at approximately 10, however, this week she presented to the office

again complaining of a cough.  No shortness of breath, chest pain or

lightheadedness.  Her blood count at that time was stable.  We did discuss

getting an x-ray in the office.  However, she did not want to do that as she

was concerned about spending too much time, so she went home. However, her

cough persisted during the rest of the week and a close friend called the

office concerned so the x-ray order was placed and the friend made sure she

came into the office to get the x-ray. At that time the x-ray showed some mild

pulmonary edema.  We went ahead and placed orders for Lasix and a repeat

echocardiogram as well.  However, the patient was not able to obtain the Lasix

and that night developed worsening cough and actually shortness of breath.  She

does suffer from anxiety and this panicked her a great deal. She was brought to

the emergency room were her pulse ox was 90%, her blood pressure was elevated

208/112 with respiratory rate of 30 and pulse of 112.  Chest x-ray in the

emergency room again showed mild pulmonary edema as well as an enlarged heart.

She was admitted. On initial troponin that was done was 0.06.  She has been

admitted for diuresis and evaluation of her cardiac status.  She states that

aside from the cough she has been doing relatively well.  She has had no

abdominal pain or change in her bowel movements.  However, she does state that

since admission during the night she has had some three episodes of diarrhea.

She has not noticed any increased weight or swelling of her extremities.

 

PAST MEDICAL HISTORY

Past medical history is significant for the iron deficiency anemia,

diverticulosis, Valdez's esophagus, colon polyps, hypertension,

hyperlipidemia, lumbar stenosis.  She also has macular degeneration, major

depressive disorder with a significant amount of anxiety, is under the care

with psychiatrist.

 

PAST SURGICAL HISTORY

Surgical history includes total abdominal hysterectomy with BSO. A total knee

arthroplasty, cataract surgeries.

 

ALLERGIES

SHE IS ALLERGIC TO MORPHINE.

 

MEDICATIONS

Medications include:

1. Atorvastatin 20 milligrams daily.

2. Omeprazole 20 milligrams daily.

3. Sertraline 150 milligrams daily.

4. Alprazolam 0.25 3 times a day.

 

HABITS

She has occasionally a beer or vodka. She does not smoke.

 

SOCIAL HISTORY

She lives by herself in [facility]. She has friends who are very attentive to

her. She was a .  She is currently .  She has a daughter

who will be visiting in town shortly.

 

REVIEW OF SYSTEMS

See history of present illness.

 

PHYSICAL EXAMINATION

VITAL SIGNS: By the time on I see her in the hospital her vital signs her

temperature is 97, pulse is 82, respirations 17, blood pressure is 112/70,

pulse ox is 96% on 2 liters.  She is actually lying flat that in the hospital

bed at this point.

HEENT: She is normocephalic, atraumatic.  EOM is intact. She is wearing

glasses.  She has a moist oral mucosa.

NECK: Her neck is supple.

LUNGS: Lungs were clear to auscultation.  I hear no rales.

HEART: Her heart is regular.  She has a holosystolic murmur across her

precordium.

ABDOMEN:  Abdomen has good bowel sounds in all four quadrants.  No rebound or

guarding.

EXTREMITIES:  Show no clubbing, cyanosis or edema

 

LABORATORY DATA

Lab work that was done when she came in showed a white count of 10.6,

hemoglobin of 10.4, hematocrit of 32.8, platelet count of 353.  Sodium was 135

with a potassium of 3.5, BUN was 23 with a creatinine of 0.93, random glucose

was 157.  Electrolytes and liver enzymes were normal.  They did initial CK as

stated was 0.06.  They did a BNP that was 638. PT was 10.3, INR was 0.9.  UA

was negative except for trace leukocyte esterase.

 

IMAGING STUDIES

Chest x-ray read as mild congestive heart failure.

 

ASSESSMENT/PLAN

1. An 84-year-old female presenting to the emergency room with congestive heart

   failure with mild elevation of troponins. At this time she has been

   admitted.  I have ordered a repeat echocardiogram on her.  Will consult

   cardiology to see if they feel at this point they need to intervene with 
further

   cardiac evaluation.

2. For her iron-deficiency anemia and prior GI bleed, at this point her H&H has

   been stable. She has had some diarrhea today which is a little bit

   concerning. It might be the potassium that she has been receiving. Will

   continue to monitor her blood count.

3. For her anxiety will continue on her sertraline and her alprazolam.

4. Hypertension, on prior visits her lisinopril was stopped as her blood

   pressure was running low. At this time with the Lasix her blood pressure is

   down to the low 100s. Will monitor prior to resuming her dose of

   lisinopril.

 

Further recommendations as the case develops.

 

 

                               __________________________________

                           MD CHRIS Ortega/VARSHA

D:  8/19/2017/1:51 PM

T:  8/19/2017/2:08 PM

Visit #:  [id number]

Job #:  [id number]

CASIMIRO Oriented - self; Oriented - place; Oriented - time

## 2023-04-14 NOTE — ECHRPT
Indication:   HEART FAILURE

 

 CONCLUSIONS

 Normal left ventricular size. 

 Mild concentric left ventricular hypertrophy. 

 The left ventricular systolic function is normal with an estimated ejection fraction in the range of
 55-60%.  

 No regional wall motion abnormalities are present. 

 Severe aortic valve stenosis. 

 AV Peak Gr

 AV Mean Gr44.0 mmHg

 Mild aortic valve regurgitation. 

 Mild-to-moderate mitral valve regurgitation. 

 Mitral annular calcification is present. 

 Severe thickening of the aortic valve leaflets. 

 There is mild tricuspid valve regurgitation. 

 There is estimated mild pulmonary hypertension present (range 40-50 mmHg). 

 The pulmonary valve is not well visualized.  

 

 BP:        /         HR:                          Rhythm:           Sinus

 

 MEASUREMENTS  (Male / Female) Normal Values       Technical Quality:Good

 2D ECHO

 LV Diastolic Diameter PLAX        5.0 cm                4.2 - 5.9 / 3.9 - 5.3 cm

 LV Systolic Diameter PLAX         3.4 cm                

 IVS Diastolic Thickness           1.2 cm                0.6 - 1.0 / 0.6 - 0.9 cm

 LVPW Diastolic Thickness          1.2 cm                0.6 - 1.0 / 0.6 - 0.9 cm

 LV Relative Wall Thickness        0.5                   

 RV Internal Dim ED PLAX           2.3 cm                

 LVOT Diameter                     1.9 cm                

 MV Area Planimetry                24.3 cm              

 LV Ejection Fraction MOD 4C       57.5 %                

 LV Ejection Fraction 4C AL        57.9 %                

 

 M-MODE

 Aortic Root Diameter MM           2.4 cm                

 LA Systolic Diameter MM           4.9 cm                

 LA Ao Ratio MM                    2.0                   

 AV Cusp Separation MM             0.6 cm                

 

 DOPPLER

 AV Peak Velocity                  438.0 cm/s            

 AV Peak Gradient                  76.7 mmHg             

 AV Mean Gradient                  44.0 mmHg             

 AV Velocity Time Integral         102.4 cm              

 LVOT Peak Velocity                95.8 cm/s             

 LVOT Peak Gradient                3.7 mmHg              

 LVOT Velocity Time Integral       23.6 cm               

 AV Area Cont Eq vti               0.7 cm               

 AV Area Cont Eq pk                0.6 cm               

 MV Area PHT                       4.0 cm               

 Mitral E Point Velocity           143.0 cm/s            

 Mitral A Point Velocity           87.9 cm/s             

 Mitral E to A Ratio               1.6                   

 TR Peak Velocity                  288.0 cm/s            

 TR Peak Gradient                  33.2 mmHg             

 PV Peak Velocity                  107.0 cm/s            

 PV Peak Gradient                  4.6 mmHg              

 

 

 FINDINGS

 

 LEFT VENTRICLE

 Normal left ventricular size. 

 Mild concentric left ventricular hypertrophy. 

 

 The left ventricular systolic function is normal with an estimated ejection fraction in the range of
 55-60%. 

 No regional wall motion abnormalities are present. 

 

 RIGHT VENTRICLE

 Normal right ventricular size and systolic function.  

 

 LEFT ATRIUM

 The left atrial size is normal.  

 

 RIGHT ATRIUM

 The right atrial size is normal.  

 

 ATRIAL SEPTUM

 Normal atrial septal thickness without atrial level shunting by limited color doppler interrogation.
  

 

 AORTA

 The aortic root and proximal ascending aorta are normal in size on limited imaging.  

 

 MITRAL VALVE

 Mild-to-moderate mitral valve regurgitation. 

 Mitral annular calcification is present. 

 

 AORTIC VALVE

 Mild aortic valve regurgitation. 

 Severe thickening of the aortic valve leaflets. 

 Severe aortic valve stenosis. 

 Aortic valve area is 0.65 cm. 

 

 TRICUSPID VALVE

 There is mild tricuspid valve regurgitation. 

 There is estimated mild pulmonary hypertension present (range 40-50 mmHg). 

 

 PULMONARY VALVE

 The pulmonary valve is not well visualized.  

 

 VESSELS

 The inferior vena cava is normal in size.  

 

 PERICARDIUM

 No pericardial effusion.  

 

 

 

 

  Alvin Edge MD

  (Electronically Signed)

  Final Date:19 August 2017 14:57 Tremfya Amount: 100 mg

## 2023-04-25 NOTE — PD.ONC.PN
Subjective


Subjective


Remarks


Still having dark stools- 


Pt reports she has always had dark stool because of taking oral iron and did 

not notice a difference with the bleed


Tolerating IV iron





Objective


Data











  Date Time  Temp Pulse Resp B/P (MAP) Pulse Ox O2 Delivery O2 Flow Rate FiO2


 


8/31/17 08:00 97.0 85 18 104/53 (70) 96   


 


8/31/17 04:30 97.0 84 17 90/50 (63) 96   


 


8/30/17 19:50 97.1 85 18 99/50 (66) 95   


 


8/30/17 16:00 97.6 88 17 90/51 (64) 94   














 8/31/17 8/31/17 8/31/17





 07:00 15:00 23:00


 


Intake Total 240 ml  


 


Balance 240 ml  








Result Diagram:  


8/30/17 0715 8/30/17 0715








Administered Medications








 Medications


  (Trade)  Dose


 Ordered  Sig/Brigitte


 Route


 PRN Reason  Start Time


 Stop Time Status Last Admin


Dose Admin


 


 Sodium Chloride


  (NS Flush)  2 ml  BID


 IV FLUSH


   8/28/17 21:00


    8/31/17 09:20


 


 


 Acetaminophen


  (Tylenol)  650 mg  Q4H  PRN


 PO


 TEMP > 100.4  8/28/17 17:45


    8/30/17 09:06


 


 


 Alprazolam


  (Xanax)  0.25 mg  TID  PRN


 PO


 ANXIETY  8/28/17 17:45


    8/31/17 11:03


 


 


 Atorvastatin


 Calcium


  (Lipitor)  20 mg  HS


 PO


   8/28/17 21:00


    8/30/17 20:36


 


 


 Cetirizine HCl


  (ZyrTEC)  10 mg  DAILY


 PO


   8/29/17 09:00


    8/31/17 09:19


 


 


 Furosemide


  (Lasix)  20 mg  DAILY


 PO


   8/29/17 09:00


    8/30/17 09:00


 


 


 Lisinopril


  (Prinivil)  2.5 mg  DAILY


 PO


   8/29/17 09:00


    8/30/17 09:01


 


 


 Vit C/Vit E/Zinc/


 Copper/Lutein


  (Ocuvite)  1 tab  DAILY


 PO


   8/29/17 09:00


    8/31/17 09:18


 


 


 Polysaccharide


 Iron Complex


  (Nu-Iron)  150 mg  DAILY


 PO


   8/29/17 09:00


    8/31/17 09:19


 


 


 Potassium Chloride


  (KCl)  20 meq  DAILY


 PO


   8/29/17 09:00


    8/31/17 09:19


 


 


 Sertraline HCl


  (Zoloft)  150 mg  DAILY


 PO


   8/29/17 09:00


    8/31/17 09:18


 


 


 Pantoprazole


 Sodium


  (Protonix Inj)  40 mg  Q12H


 IV PUSH


   8/28/17 20:00


    8/31/17 09:20


 


 


 Iron Sucrose 200


 mg/Sodium Chloride  110 ml @ 


 110 mls/hr  DAILY


 IV


   8/30/17 18:00


 9/1/17 09:59  8/31/17 09:19


 








Objective Remarks


GENERAL: Elderly female resting in bed in no acute distress.


SKIN: Warm and dry.


HEAD: Normocephalic.


EYES: No scleral icterus. No injection or drainage. 


NECK: Supple, trachea midline. No JVD or lymphadenopathy.


CARDIOVASCULAR: Regular rate and rhythm without murmurs. 


RESPIRATORY: Breath sounds equal bilaterally. No accessory muscle use.


GASTROINTESTINAL: Abdomen soft, non-tender, nondistended. 


EXTREMITIES: No cyanosis, or edema. 


NEUROLOGICAL: No obvious focal deficit. Awake, alert, and oriented x3.





Assessment/Plan


Problem List:  


(1) Anemia


ICD Codes:  D64.9 - Anemia, unspecified


Status:  Acute


Plan:  -- Iron deficiency likely due to GI bleed


-- Microcytosis noted


-- Will transfuse iron sucrose x 3 days





(2) GI bleed


ICD Codes:  K92.2 - Gastrointestinal hemorrhage, unspecified


Status:  Resolved


Plan:  -- GI evaluation in June 2017; she was found to have thickened duodenum.


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


-- After that she had antegrade single enteroscopy that did not show any active 

bleeding


-- GI recommend that she has a double balloon enteroscopy as an outpatient at 

another facility





(3) Aortic valve stenosis, severe


ICD Codes:  I35.0 - Nonrheumatic aortic (valve) stenosis


Status:  Acute


Plan:  -- Echocardiogram on 8/19/17 showed severe aortic valve stenosis


--Per her cardiologist, there'll be no intervention until cleared by GI





Assessment


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 

to the ER with dark stools and SOB and found to have a hgb of 5.6.


Plan


1. No evidence of B12 or folate deficiency


2. Patient is tolerating IV iron without difficulty


3. Last dose of iron sucrose tomorrow


4. Continue to monitor CBC





Discussed with GI


Attending Statement


The exam, history, and the medical decision-making described in the above note 

were completed with the assistance of the mid-level provider. I reviewed and 

agree with the findings presented.  I attest that I had a face-to-face 

encounter with the patient on the same day, and personally performed and 

documented my assessment and findings in the medical record.


tolerating iron


being seen by cardiology


Hb up


f/u in oncology clinic in 2-3 weeks outpatient











Elise Fernando Aug 31, 2017 15:01


Hamzah Newton MD Aug 31, 2017 23:50 Other

## 2024-04-15 NOTE — HHI.GIFU
Subjective


Remarks


Pt resting in bed.  Reports not feeling well today.  Has not had a BM since 

yesterday.  Continued abdominal pain and distension.  


 (Evangelina Sam)





Objective


Vitals I&O





Vital Signs








  Date Time  Temp Pulse Resp B/P (MAP) Pulse Ox O2 Delivery O2 Flow Rate FiO2


 


1/26/18 12:00 96.8 99 17 131/77 (95) 93   


 


1/26/18 08:00 97.9 95 17 136/85 (102) 94   


 


1/26/18 04:37  95      


 


1/26/18 04:00 97.1 83 20 132/72 (92) 94   


 


1/26/18 00:00 97.2 92 20 127/70 (89) 95   


 


1/25/18 23:53  89      


 


1/25/18 20:02  97      


 


1/25/18 20:02  97      


 


1/25/18 20:00 97.1 98 20 128/71 (90) 95   


 


1/25/18 16:00 96.6 100 20 121/67 (85) 93   














I/O      


 


 1/25/18 1/25/18 1/25/18 1/26/18 1/26/18 1/26/18





 07:00 15:00 23:00 07:00 15:00 23:00


 


Intake Total 581 ml  1830 ml 120 ml  


 


Output Total    1180 ml  


 


Balance 581 ml  1830 ml -1060 ml  


 


      


 


Intake Oral 60 ml  240 ml 120 ml  


 


IV Total 521 ml  1590 ml   


 


Output Urine Total    1180 ml  


 


# Voids 1  3   


 


# Bowel Movements 1  0 0  








Laboratory





Laboratory Tests








Test


  1/26/18


05:20


 


White Blood Count 12.0 


 


Red Blood Count 3.97 


 


Hemoglobin 11.2 


 


Hematocrit 33.5 


 


Mean Corpuscular Volume 84.3 


 


Mean Corpuscular Hemoglobin 28.1 


 


Mean Corpuscular Hemoglobin


Concent 33.3 


 


 


Red Cell Distribution Width 21.5 


 


Platelet Count 288 


 


Mean Platelet Volume 7.9 


 


Neutrophils (%) (Auto) 86.5 


 


Lymphocytes (%) (Auto) 5.1 


 


Monocytes (%) (Auto) 7.1 


 


Eosinophils (%) (Auto) 1.1 


 


Basophils (%) (Auto) 0.2 


 


Neutrophils # (Auto) 10.4 


 


Lymphocytes # (Auto) 0.6 


 


Monocytes # (Auto) 0.8 


 


Eosinophils # (Auto) 0.1 


 


Basophils # (Auto) 0.0 


 


CBC Comment AUTO DIFF 


 


Differential Total Cells


Counted 100 


 


 


Neutrophils % (Manual) 54 


 


Band Neutrophils % 34 


 


Lymphocytes % 4 


 


Monocytes % 6 


 


Neutrophils # (Manual) 10.8 


 


Myelocytes 2 


 


Differential Comment


  FINAL DIFF


MANUAL


 


Toxic Granulation 1+ 


 


Platelet Estimate NORMAL 


 


Platelet Morphology Comment NORMAL 


 


Ovalocytes 1+ 


 


Meng Cells 1+ 


 


Acanthocytes OCC 


 


Blood Urea Nitrogen 26 


 


Creatinine 0.56 


 


Random Glucose 123 


 


Total Protein 4.7 


 


Calcium Level 7.0 


 


Sodium Level 132 


 


Potassium Level 4.1 


 


Chloride Level 103 


 


Carbon Dioxide Level 22.6 


 


Anion Gap 6 


 


Estimat Glomerular Filtration


Rate 103 


 


 


Protein Corrected Calcium 8.3 














 Date/Time


Source Procedure


Growth Status


 


 


 1/20/18 21:45


Stool Stool - Final


NO ENTERIC PATHOGENS DETECTED BY PCR... Complete


 


 1/23/18 04:20


Urine Catheterized Urine Urine Culture - Final


,000 CFU/ML MIXED MERARI... Complete








Imaging





Last Impressions








Abdomen/Pelvis CT 1/25/18 0000 Signed





Impressions: 





 Service Date/Time:  Thursday, January 25, 2018 10:25 - CONCLUSION:  1. 

Worsening 





 colonic distention. The colon is fluid filled. No significant inflammatory 





 process is seen involving the colon, however, pan colitis could have this 





 appearance. 2. Small volume ascites. 3. New small bilateral pleural effusions 





 and bibasilar atelectasis. 4. Cholelithiasis.     Theodore Duran Jr., MD 


 


Chest X-Ray 1/24/18 0000 Signed





Impressions: 





 Service Date/Time:  Wednesday, January 24, 2018 13:41 - CONCLUSION:  Minimal 





 progression of parenchymal changes left base..     Tanner Vann MD  FACR


 


Abdomen X-Ray 1/24/18 0000 Signed





Impressions: 





 Service Date/Time:  Wednesday, January 24, 2018 13:44 - CONCLUSION:  Gaseous 





 distention as described above.  Descending colon is gasless.  CT scan had 

shown 





 colitis. There is no free air.     Tanner Vann MD  FACR








Physical Exam


HEENT: Normocephalic; atraumatic


CHEST:  Tachypneic 


CARDIAC:  RRR


ABDOMEN:  Distended, firm, diffuse TTP, bowel sounds active 


SKIN:  Diaphoretic 


CNS:  No focal deficits; alert and oriented times three.


 (Evangelina Sam)





Assessment and Plan


Assessment:  


(1) C. difficile colitis


ICD Codes:  A04.72 - Enterocolitis due to Clostridium difficile, not specified 

as recurrent


(2) Gastritis


ICD Codes:  K29.70 - Gastritis, unspecified, without bleeding


Status:  Acute


(3) Esophagitis


ICD Codes:  K20.9 - Esophagitis, unspecified


Status:  Acute


(4) Hiatal hernia


ICD Codes:  K44.9 - Diaphragmatic hernia without obstruction or gangrene


Status:  Acute


Plan


Assessment:


- Colitis likely secondary to C. Diff infection- pt reports this is her first 

occurrence of C. Diff. 


  C diff toxin positive. Epid negative. WBCs improving, now 12.  Pt is 

currently on oral Vanco and Flagyl. 


  Denies any BM today, spoke with nurse who confirms.  Afebrile overnight. Pt 

remains with 


  significant abdominal distention and tenderness.  If no improvement with 

Vanco and Flagyl


  can consider Dificid.    


  Pt currently on PPN.  Lunch tray at bedside, she has not eaten anything off 

of it. 


  CT abdomen and pelvis with contrast (1/20) --> Worsening colonic distention. 

The colon is fluid


  filled. No significant inflammatory process is seen involving the colon, 

however, pan colitis could


  have this appearance.  Small volume ascites. Cholelithiasis.  





Plan:


- DC PO Flagyl and start PO Flagyl 


- Continue PO Vanco


- Monitor stool count


- Repeat KUB


- Monitor labs 


- Further recommendations to follow





Pt has been seen and examined by myself and Dr. Rubio and this note is 

written on his behalf 


 (Evangelina Sam)


Plan


Patient was seen and examined, agree with the above noted, we will start IV 

Flagyl instead of by mouth, I discuss patient with primary team since she has 

some wheezing they will evaluate her respiratory status, we will follow up on 

the KUB


 (Birdie Rubio MD)











Evangelina Sam Jan 26, 2018 14:53


Birdie Rubio MD Jan 26, 2018 17:42 Yes